# Patient Record
Sex: FEMALE | Race: BLACK OR AFRICAN AMERICAN | NOT HISPANIC OR LATINO | Employment: UNEMPLOYED | URBAN - METROPOLITAN AREA
[De-identification: names, ages, dates, MRNs, and addresses within clinical notes are randomized per-mention and may not be internally consistent; named-entity substitution may affect disease eponyms.]

---

## 2017-05-11 ENCOUNTER — OFFICE VISIT (OUTPATIENT)
Dept: OBSTETRICS AND GYNECOLOGY | Facility: CLINIC | Age: 47
End: 2017-05-11
Payer: COMMERCIAL

## 2017-05-11 ENCOUNTER — HOSPITAL ENCOUNTER (OUTPATIENT)
Dept: RADIOLOGY | Facility: HOSPITAL | Age: 47
Discharge: HOME | End: 2017-05-11
Attending: NURSE PRACTITIONER
Payer: COMMERCIAL

## 2017-05-11 VITALS
BODY MASS INDEX: 37.15 KG/M2 | HEIGHT: 63 IN | WEIGHT: 209.69 LBS | SYSTOLIC BLOOD PRESSURE: 138 MMHG | DIASTOLIC BLOOD PRESSURE: 80 MMHG

## 2017-05-11 DIAGNOSIS — Z12.31 ENCOUNTER FOR SCREENING MAMMOGRAM FOR BREAST CANCER: ICD-10-CM

## 2017-05-11 DIAGNOSIS — Z01.419 ENCOUNTER FOR GYNECOLOGICAL EXAMINATION WITHOUT ABNORMAL FINDING: Primary | ICD-10-CM

## 2017-05-11 DIAGNOSIS — R30.0 DYSURIA: ICD-10-CM

## 2017-05-11 DIAGNOSIS — R81 GLUCOSE FOUND IN URINE ON EXAMINATION: ICD-10-CM

## 2017-05-11 PROCEDURE — 99999 PR PBB SHADOW E&M-NEW PATIENT-LVL III: CPT | Mod: PBBFAC,,, | Performed by: NURSE PRACTITIONER

## 2017-05-11 PROCEDURE — 77067 SCR MAMMO BI INCL CAD: CPT | Mod: TC

## 2017-05-11 PROCEDURE — 99386 PREV VISIT NEW AGE 40-64: CPT | Mod: S$GLB,,, | Performed by: NURSE PRACTITIONER

## 2017-05-11 PROCEDURE — 77067 SCR MAMMO BI INCL CAD: CPT | Mod: 26,,, | Performed by: RADIOLOGY

## 2017-05-11 RX ORDER — AMLODIPINE BESYLATE 5 MG/1
TABLET ORAL
COMMUNITY
Start: 2017-04-11 | End: 2020-11-06

## 2017-05-11 RX ORDER — FUROSEMIDE 20 MG/1
TABLET ORAL
COMMUNITY
Start: 2017-04-11 | End: 2020-11-06

## 2017-05-11 NOTE — PROGRESS NOTES
"CC: Well woman exam    Rosibel Herrera is a 47 y.o. female  presents for a well woman exam.  No issues, problems, or complaints.      Past Medical History:   Diagnosis Date    Hypertension      Past Surgical History:   Procedure Laterality Date    ENDOMETRIAL ABLATION      HYSTERECTOMY      LITHOTRIPSY       Family History   Problem Relation Age of Onset    Diabetes Father      Social History   Substance Use Topics    Smoking status: Never Smoker    Smokeless tobacco: None    Alcohol use Yes      Comment: socially     OB History      Para Term  AB TAB SAB Ectopic Multiple Living    4 4        4          /80  Ht 5' 3" (1.6 m)  Wt 95.1 kg (209 lb 10.5 oz)  BMI 37.14 kg/m2    ROS:  GENERAL: Denies weight gain or weight loss. Feeling well overall.   SKIN: Denies rash or lesions.   HEAD: Denies head injury or headache.   NODES: Denies enlarged lymph nodes.   CHEST: Denies chest pain or shortness of breath.   CARDIOVASCULAR: Denies palpitations or left sided chest pain.   ABDOMEN: No abdominal pain, constipation, diarrhea, nausea, vomiting or rectal bleeding.   URINARY: No frequency, dysuria, hematuria, or burning on urination.  REPRODUCTIVE: See HPI.   BREASTS: The patient performs breast self-examination and denies pain, lumps, or nipple discharge.   HEMATOLOGIC: No easy bruisability or excessive bleeding.   MUSCULOSKELETAL: Denies joint pain or swelling.   NEUROLOGIC: Denies syncope or weakness.   PSYCHIATRIC: Denies depression, anxiety or mood swings.    PE:   APPEARANCE: Well nourished, well developed, in no acute distress.  AFFECT: WNL, alert and oriented x 3.  SKIN: No acne or hirsutism.  NECK: Neck symmetric without masses or thyromegaly.  NODES: No inguinal, cervical, axillary or femoral lymph node enlargement.  CHEST: Good respiratory effort.   ABDOMEN: Soft. No tenderness or masses. No hepatosplenomegaly. No hernias.  BREASTS: Symmetrical, no skin changes or visible lesions. No " palpable masses, nipple discharge bilaterally.  PELVIC: Normal external female genitalia without lesions. Normal hair distribution. Adequate perineal body, normal urethral meatus. Vagina atrophic without lesions or discharge. No significant cystocele or rectocele. Bimanual exam shows uterus and cervix to be surgically absent. Adnexa without masses or tenderness.      1. Encounter for gynecological examination without abnormal finding     2. Encounter for screening mammogram for breast cancer  Mammo Digital Screening Bilat with CAD   3. Dysuria  POCT URINALYSIS    and PLAN:    Patient was counseled today on A.C.S. Pap guidelines and recommendations for yearly pelvic exams, mammograms and monthly self breast exams; to see her PCP for other health maintenance.     ua dip shows 100 Glucose - pt has only drank a cup of coffee early am - otherwise has not had anything to eat  Recommend to see her pcp asap to have this checked out

## 2019-04-29 ENCOUNTER — TELEPHONE (OUTPATIENT)
Dept: OBSTETRICS AND GYNECOLOGY | Facility: CLINIC | Age: 49
End: 2019-04-29

## 2019-04-29 NOTE — TELEPHONE ENCOUNTER
Returned call to patient.  She requested a well woman visit with a physician and not a NP, per patient.  Appointment scheduled 05/01/19 at 9:00 am with Dr. Lisa, she confirmed appointment, location and provider.

## 2019-04-29 NOTE — TELEPHONE ENCOUNTER
----- Message from Maria Teresa Frost sent at 4/29/2019  2:54 PM CDT -----  Contact: pt  The pt request a call to schedule a appt, no additional info given, the pt can be reached at [phone] or [phone]///[id number]

## 2019-05-01 ENCOUNTER — OFFICE VISIT (OUTPATIENT)
Dept: OBSTETRICS AND GYNECOLOGY | Facility: CLINIC | Age: 49
End: 2019-05-01
Payer: MEDICAID

## 2019-05-01 ENCOUNTER — HOSPITAL ENCOUNTER (OUTPATIENT)
Dept: RADIOLOGY | Facility: HOSPITAL | Age: 49
Discharge: HOME | End: 2019-05-01
Attending: OBSTETRICS & GYNECOLOGY
Payer: MEDICAID

## 2019-05-01 VITALS
HEIGHT: 63 IN | SYSTOLIC BLOOD PRESSURE: 164 MMHG | WEIGHT: 197.06 LBS | BODY MASS INDEX: 34.91 KG/M2 | DIASTOLIC BLOOD PRESSURE: 100 MMHG

## 2019-05-01 DIAGNOSIS — Z12.39 BREAST CANCER SCREENING: Primary | ICD-10-CM

## 2019-05-01 DIAGNOSIS — Z12.39 BREAST CANCER SCREENING: ICD-10-CM

## 2019-05-01 DIAGNOSIS — Z78.0 MENOPAUSE: ICD-10-CM

## 2019-05-01 DIAGNOSIS — Z12.11 COLON CANCER SCREENING: ICD-10-CM

## 2019-05-01 DIAGNOSIS — F32.A ANXIETY AND DEPRESSION: ICD-10-CM

## 2019-05-01 DIAGNOSIS — F41.9 ANXIETY AND DEPRESSION: ICD-10-CM

## 2019-05-01 DIAGNOSIS — Z01.419 ENCOUNTER FOR GYNECOLOGICAL EXAMINATION WITHOUT ABNORMAL FINDING: ICD-10-CM

## 2019-05-01 PROCEDURE — 99396 PR PREVENTIVE VISIT,EST,40-64: ICD-10-PCS | Mod: S$PBB,,, | Performed by: OBSTETRICS & GYNECOLOGY

## 2019-05-01 PROCEDURE — 77063 MAMMO DIGITAL SCREENING BILAT WITH TOMOSYNTHESIS_CAD: ICD-10-PCS | Mod: 26,,, | Performed by: RADIOLOGY

## 2019-05-01 PROCEDURE — 99999 PR PBB SHADOW E&M-EST. PATIENT-LVL III: CPT | Mod: PBBFAC,,, | Performed by: OBSTETRICS & GYNECOLOGY

## 2019-05-01 PROCEDURE — 77067 MAMMO DIGITAL SCREENING BILAT WITH TOMOSYNTHESIS_CAD: ICD-10-PCS | Mod: 26,,, | Performed by: RADIOLOGY

## 2019-05-01 PROCEDURE — 99999 PR PBB SHADOW E&M-EST. PATIENT-LVL III: ICD-10-PCS | Mod: PBBFAC,,, | Performed by: OBSTETRICS & GYNECOLOGY

## 2019-05-01 PROCEDURE — 77063 BREAST TOMOSYNTHESIS BI: CPT | Mod: 26,,, | Performed by: RADIOLOGY

## 2019-05-01 PROCEDURE — 77067 SCR MAMMO BI INCL CAD: CPT | Mod: 26,,, | Performed by: RADIOLOGY

## 2019-05-01 PROCEDURE — 99396 PREV VISIT EST AGE 40-64: CPT | Mod: S$PBB,,, | Performed by: OBSTETRICS & GYNECOLOGY

## 2019-05-01 PROCEDURE — 77067 SCR MAMMO BI INCL CAD: CPT | Mod: TC

## 2019-05-01 PROCEDURE — 99213 OFFICE O/P EST LOW 20 MIN: CPT | Mod: PBBFAC | Performed by: OBSTETRICS & GYNECOLOGY

## 2019-05-01 RX ORDER — PAROXETINE HYDROCHLORIDE 20 MG/1
20 TABLET, FILM COATED ORAL EVERY MORNING
Qty: 30 TABLET | Refills: 11 | Status: SHIPPED | OUTPATIENT
Start: 2019-05-01 | End: 2020-11-06

## 2019-05-02 NOTE — PROGRESS NOTES
CC: Well woman exam    Rosibel Herrera is a 49 y.o. female  presents for a well woman exam.  LMP: No LMP recorded. Patient has had a hysterectomy.. Reports panic attacks, shakes, hot flashes. Has had panic attacks in 1980s that were stress related & currently going through stress now. Seen in ER 3/2019 for chest pain/sob    Past Medical History:   Diagnosis Date    Hypertension      Past Surgical History:   Procedure Laterality Date    ENDOMETRIAL ABLATION      HYSTERECTOMY      fibroids    LITHOTRIPSY       Social History     Socioeconomic History    Marital status:      Spouse name: Not on file    Number of children: Not on file    Years of education: Not on file    Highest education level: Not on file   Occupational History    Not on file   Social Needs    Financial resource strain: Not on file    Food insecurity:     Worry: Not on file     Inability: Not on file    Transportation needs:     Medical: Not on file     Non-medical: Not on file   Tobacco Use    Smoking status: Never Smoker    Smokeless tobacco: Never Used   Substance and Sexual Activity    Alcohol use: Yes     Comment: socially    Drug use: No    Sexual activity: Yes   Lifestyle    Physical activity:     Days per week: Not on file     Minutes per session: Not on file    Stress: Not on file   Relationships    Social connections:     Talks on phone: Not on file     Gets together: Not on file     Attends [employer] service: Not on file     Active member of club or organization: Not on file     Attends meetings of clubs or organizations: Not on file     Relationship status: Not on file   Other Topics Concern    Not on file   Social History Narrative    Not on file     Family History   Problem Relation Age of Onset    Diabetes Father      OB History        4    Para   4    Term   4            AB        Living   4       SAB        TAB        Ectopic        Multiple        Live Births   4            "      Current Outpatient Medications:     amlodipine (NORVASC) 5 MG tablet, , Disp: , Rfl:     furosemide (LASIX) 20 MG tablet, , Disp: , Rfl:     paroxetine (PAXIL) 20 MG tablet, Take 1 tablet (20 mg total) by mouth every morning., Disp: 30 tablet, Rfl: 11    GYNECOLOGY HISTORY:  No abnormal pap/std    DATA REVIEWED:  Last pap: no abnormal   Last mmg: normal Date: 2017; done today  Last colonoscopy: none    BP (!) 164/100 (BP Location: Left arm)   Ht 5' 3" (1.6 m)   Wt 89.4 kg (197 lb 1.5 oz)   BMI 34.91 kg/m²   *just took her BP meds    ROS:  GENERAL: Denies weight gain or weight loss. Feeling well overall.   SKIN: Denies rash or lesions.   HEAD: Denies head injury or headache.   NODES: Denies enlarged lymph nodes.   CHEST: Denies chest pain or shortness of breath.   CARDIOVASCULAR: Denies palpitations or left sided chest pain.   ABDOMEN: No abdominal pain, constipation, diarrhea, nausea, vomiting or rectal bleeding.   URINARY: No frequency, dysuria, hematuria, or burning on urination.  REPRODUCTIVE: See HPI.   BREASTS: The patient denies pain, lumps, or nipple discharge.   HEMATOLOGIC: No easy bruisability or excessive bleeding.   MUSCULOSKELETAL: Denies joint pain or swelling.   NEUROLOGIC: Denies syncope or weakness.   PSYCHIATRIC: Denies depression, anxiety or mood swings.    PHYSICAL EXAM:    APPEARANCE: Well nourished, well developed, in no acute distress.  AFFECT: WNL, alert and oriented x 3  SKIN: No acne or hirsutism  NECK: Neck symmetric without masses or thyromegaly  NODES: No inguinal, cervical, axillary, or femoral lymph node enlargement  CHEST: Good respiratory effect  ABDOMEN: Soft.  No tenderness or masses.  No hepatosplenomegaly.  No hernias.  BREASTS: Symmetrical, no skin changes or visible lesions.  No palpable masses, nipple discharge bilaterally.  PELVIC: Normal external genitalia without lesions.  Normal hair distribution.  Adequate perineal body, normal urethral meatus.  Vagina " atrophic without lesions or discharge. No significant cystocele or rectocele. bimanual exam shows uterus/cervix absent  EXTREMITIES: No edema.    Breast cancer screening  -     Cancel: Mammo Digital Screening Bilat With CAD; Future; Expected date: 05/01/2019    Encounter for gynecological examination without abnormal finding    Anxiety and depression    Colon cancer screening  -     Case request GI: COLONOSCOPY    Menopause    Other orders  -     paroxetine (PAXIL) 20 MG tablet; Take 1 tablet (20 mg total) by mouth every morning.  Dispense: 30 tablet; Refill: 11    Patient was counseled today on A.C.S. Pap guidelines (none) and recommendations for yearly pelvic exams, yearly mammograms starting age 40, and clinical breast exams; to see her PCP for other health maintenance. Treatment options for symptomatic hot flashes that can alleviate anxiety as well, risks of ERT discussed. Desires trial of non-hormonal tx

## 2020-10-16 ENCOUNTER — TELEPHONE (OUTPATIENT)
Dept: OBSTETRICS AND GYNECOLOGY | Facility: CLINIC | Age: 50
End: 2020-10-16

## 2020-10-16 DIAGNOSIS — Z12.31 ENCOUNTER FOR SCREENING MAMMOGRAM FOR MALIGNANT NEOPLASM OF BREAST: Primary | ICD-10-CM

## 2020-10-16 NOTE — TELEPHONE ENCOUNTER
----- Message from Bailee Oliveira sent at 10/16/2020 12:30 PM CDT -----  Regarding: mammo, annual  Type:  Sooner Apoointment Request    Caller is requesting a sooner appointment.  Caller declined first available appointment listed below.  Caller will not accept being placed on the waitlist and is requesting a message be sent to doctor.  Name of Caller:pt  When is the first available appointment?n/a  Symptoms:mammo, annual  Would the patient rather a call back or a response via [facility]? Call back  Best Call Back Number:[phone]  Additional Information: Please call back. Thanks

## 2020-10-16 NOTE — TELEPHONE ENCOUNTER
Called patient and scheduled mammo and annual.  Visitor's policy and check in procedure explained and patient verbalized understanding.

## 2020-11-06 ENCOUNTER — OFFICE VISIT (OUTPATIENT)
Dept: OBSTETRICS AND GYNECOLOGY | Facility: CLINIC | Age: 50
End: 2020-11-06
Payer: MEDICAID

## 2020-11-06 ENCOUNTER — HOSPITAL ENCOUNTER (OUTPATIENT)
Dept: RADIOLOGY | Facility: HOSPITAL | Age: 50
Discharge: HOME | End: 2020-11-06
Attending: NURSE PRACTITIONER
Payer: MEDICAID

## 2020-11-06 VITALS — HEIGHT: 63 IN | WEIGHT: 197.06 LBS | BODY MASS INDEX: 34.91 KG/M2

## 2020-11-06 VITALS
BODY MASS INDEX: 35.47 KG/M2 | SYSTOLIC BLOOD PRESSURE: 138 MMHG | HEIGHT: 63 IN | WEIGHT: 200.19 LBS | DIASTOLIC BLOOD PRESSURE: 96 MMHG

## 2020-11-06 DIAGNOSIS — F41.9 ANXIETY: ICD-10-CM

## 2020-11-06 DIAGNOSIS — Z11.3 SCREENING FOR STD (SEXUALLY TRANSMITTED DISEASE): ICD-10-CM

## 2020-11-06 DIAGNOSIS — Z12.31 ENCOUNTER FOR SCREENING MAMMOGRAM FOR MALIGNANT NEOPLASM OF BREAST: ICD-10-CM

## 2020-11-06 DIAGNOSIS — Z01.419 ENCOUNTER FOR GYNECOLOGICAL EXAMINATION WITHOUT ABNORMAL FINDING: Primary | ICD-10-CM

## 2020-11-06 PROCEDURE — 77067 SCR MAMMO BI INCL CAD: CPT | Mod: TC

## 2020-11-06 PROCEDURE — 77063 MAMMO DIGITAL SCREENING BILAT WITH TOMO: ICD-10-PCS | Mod: 26,,, | Performed by: RADIOLOGY

## 2020-11-06 PROCEDURE — 99213 OFFICE O/P EST LOW 20 MIN: CPT | Mod: PBBFAC | Performed by: NURSE PRACTITIONER

## 2020-11-06 PROCEDURE — 77063 BREAST TOMOSYNTHESIS BI: CPT | Mod: 26,,, | Performed by: RADIOLOGY

## 2020-11-06 PROCEDURE — 99999 PR PBB SHADOW E&M-EST. PATIENT-LVL III: CPT | Mod: PBBFAC,,, | Performed by: NURSE PRACTITIONER

## 2020-11-06 PROCEDURE — 99999 PR PBB SHADOW E&M-EST. PATIENT-LVL III: ICD-10-PCS | Mod: PBBFAC,,, | Performed by: NURSE PRACTITIONER

## 2020-11-06 PROCEDURE — 99396 PR PREVENTIVE VISIT,EST,40-64: ICD-10-PCS | Mod: S$PBB,,, | Performed by: NURSE PRACTITIONER

## 2020-11-06 PROCEDURE — 99396 PREV VISIT EST AGE 40-64: CPT | Mod: S$PBB,,, | Performed by: NURSE PRACTITIONER

## 2020-11-06 PROCEDURE — 77067 SCR MAMMO BI INCL CAD: CPT | Mod: 26,,, | Performed by: RADIOLOGY

## 2020-11-06 PROCEDURE — 77067 MAMMO DIGITAL SCREENING BILAT WITH TOMO: ICD-10-PCS | Mod: 26,,, | Performed by: RADIOLOGY

## 2020-11-06 RX ORDER — LOSARTAN POTASSIUM AND HYDROCHLOROTHIAZIDE 12.5; 5 MG/1; MG/1
1 TABLET ORAL DAILY
COMMUNITY
Start: 2020-10-06

## 2020-11-06 RX ORDER — FLUOXETINE 10 MG/1
10 CAPSULE ORAL DAILY
Qty: 30 CAPSULE | Refills: 11 | Status: SHIPPED | OUTPATIENT
Start: 2020-11-06 | End: 2022-02-10

## 2020-11-06 NOTE — PATIENT INSTRUCTIONS
Treating Anxiety Disorders with Therapy    If you have an anxiety disorder, you dont have to suffer anymore. Treatment is available. Therapy (also called counseling) is often a helpful treatment for anxiety disorders. With therapy, a specially trained professional (therapist) helps you face and learn to manage your anxiety. Therapy can be short-term or long-term depending on your needs. In some cases, medicine may also be prescribed with therapy. It may take time before you notice how much therapy is helping, but stick with it. With therapy, you can feel better.  Cognitive behavioral therapy (CBT)  Cognitive behavioral therapy (CBT) teaches you to manage anxiety. It does this by helping you understand how you think and act when youre anxious. Research has shown CBT to be a very effective treatment for anxiety disorders. How CBT is run is almost like a class. It involves homework and activities to build skills that teach you to cope with anxiety step by step. It can be done in a group or one-on-one, and often takes place for a set number of sessions. CBT has two main parts:  · Cognitive therapy helps you identify the negative, irrational thoughts that occur with your anxiety. Youll learn to replace these with more positive, realistic thoughts.  · Behavioral therapy helps you change how you react to anxiety. Youll learn coping skills and methods for relaxing to help you better deal with anxiety.  Other forms of therapy  Other therapy methods may work better for you than CBT. Or, you may move from CBT to another form of therapy as your treatment needs change. This may mean meeting with a therapist by yourself or in a group. Therapy can also help you work through problems in your life, such as drug or alcohol dependence, that may be making your anxiety worse.  Getting better takes time  Therapy will help you feel better and teach you skills to help manage anxiety long term. But change doesnt happen right away. It  takes a commitment from you. And treatment only works if you learn to face the causes of your anxiety. So, you might feel worse before you feel better. This can sometimes make it hard to stick with it. But remember: Therapy is a very effective treatment. The results will be well worth it.  Helping yourself  If anxiety is wearing you down, here are some things you can do to cope:  · Check with your doctor and rule out any physical problems that may be causing the anxiety symptoms.  · If an anxiety disorder is diagnosed, seek mental healthcare. This is an illness and it can respond to treatment. Most types of anxiety disorders will respond to talk therapy and medicine.  · Educate yourself about anxiety disorders. Keep track of helpful online resources and books you can use during stressful periods.  · Try stress management techniques such as meditation.  · Consider online or in-person support groups.  · Dont fight your feelings. Anxiety feeds itself. The more you worry about it, the worse it gets. Instead, try to identify what might have triggered your anxiety. Then try to put this threat in perspective.  · Keep in mind that you cant control everything about a situation. Change what you can and let the rest take its course.  · Exercise -- its a great way to relieve tension and help your body feel relaxed.  · Examine your life for stress, and try to find ways to reduce it.  · Avoid caffeine and nicotine, which can make anxiety symptoms worse.  · Fight the temptation to turn to alcohol or unprescribed drugs for relief. They only make things worse in the long run.   Date Last Reviewed: 1/1/2017  © 8983-7765 [employer]. [address] [place] [address], [place] [address]. All rights reserved. This information is not intended as a substitute for professional medical care. Always follow your healthcare professional's instructions.        Treating Anxiety Disorders with Medicine  An anxiety disorder can make you feel  nervous or apprehensive, even without a clear reason. In people age 65 and older, generalized anxiety disorder is one of the most commonly diagnosed anxiety disorders. Many times it occurs with depression. Certain anxiety disorders can cause intense feelings of fear or panic. You may even have physical symptoms such as a racing heartbeat, sweating, or dizziness. If you have these feelings, you dont have to suffer anymore. Treatment to help you overcome your fears will likely include therapy (also called counseling). Medicine may also be prescribed to help control your symptoms.    Medicines  Certain medicines may be prescribed to help control your symptoms. So you may feel less anxious. You may also feel able to move forward with therapy. At first, medicines and dosages may need to be adjusted to find what works best for you. Try to be patient. Tell your healthcare provider how a medicine makes you feel. This way, you can work together to find the treatment thats best for you. Keep in mind that medicines can have side effects. Talk with your provider about any side effects that are bothering you. Changing the dose or type of medicine may help. Dont stop taking medicine on your own. That can cause symptoms to come back.  · Anti-anxiety medicine. This medicine eases symptoms and helps you relax. Your healthcare provider will explain when and how to use it. It may be prescribed for use before situations that make you anxious. You may also be told to take medicine on a regular schedule. Anti-anxiety medicine may make you feel a little sleepy or out of it. Dont drive a car or operate machinery while on this medicine, until you know how it affects you.  Caution  Never use alcohol or other drugs with anti-anxiety medicines. This could result in loss of muscular control, sedation, coma, or death. Also, use only the amount of medicine prescribed for you. If you think you may have taken too much, get emergency care  right away.   · Antidepressant medicine. This kind of medicine is often used to treat anxiety, even if you arent depressed. An antidepressant helps balance out brain chemicals. This helps keep anxiety under control. This medicine is taken on a schedule. It takes a few weeks to start working. If you dont notice a change at first, you may just need more time. But if you dont notice results after the first few weeks, tell your provider.  Keep taking medicines as prescribed  Never change your dosage, share or use another person's medicine, or stop taking your medicines without talking to your healthcare provider first. Keep the following in mind:  · Some medicines must be taken on a schedule. Make this part of your daily routine. For instance, always take your pill before brushing your teeth. A pillbox can help you remember if youve taken your medicine each day.  · Medicines are often taken for 6 to 12 months. Your healthcare provider will then evaluate whether you need to stay on them. Many people who have also had therapy may no longer need medicine to manage anxiety.  · You may need to stop taking medicine slowly to give your body time to adjust. When its time to stop, your healthcare provider will tell you more. Remember: Never stop taking your medicine without talking to your provider first.  · If symptoms return, you may need to start taking medicines again. This isnt your fault. Its just the nature of your anxiety disorder.  Special concerns  · Side effects. Medicines may cause side effects. Ask your healthcare provider or pharmacist what you can expect. They may have ideas for avoiding some side effects.  · Sexual problems. Some antidepressants can affect your desire for sex or your ability to have an orgasm. A change in dosage or medicine often solves the problem. If you have a sexual side effect that concerns you, tell your healthcare provider.  · Addiction. If youve never had a problem with drugs or  alcohol, you may not have a problem with medicines used to treat anxiety disorders. But always discuss the medicines with your healthcare provider before taking them. If you have a history of addiction, you may not be able to use certain medicines used to treat anxiety disorders.  · Medicine interactions. Always check with your pharmacist before using any over-the-counter medicines, including herbal supplements.   Date Last Reviewed: 5/1/2017  © 4976-1320 [employer]. [address] [place] [address], [place] [address]. All rights reserved. This information is not intended as a substitute for professional medical care. Always follow your healthcare professional's instructions.        Understanding Anxiety Disorders  Almost everyone gets nervous now and then. Its normal to have knots in your stomach before a test, or for your heart to race on a first date. But an anxiety disorder is much more than a case of nerves. In fact, its symptoms may be overwhelming. But treatment can relieve many of these symptoms. Talking to your healthcare provider is the first step.    What are anxiety disorders?  An anxiety disorder causes intense feelings of panic and fear. These feelings may arise for no apparent reason. And they tend to recur again and again. They may prevent you from coping with life and cause you great distress. As a result, you may avoid anything that triggers your fear. In extreme cases, you may never leave the house. Anxiety disorders may cause other symptoms, such as:  · Obsessive thoughts you cant control  · Constant nightmares or painful thoughts of the past  · Nausea, sweating, and muscle tension  · Trouble sleeping or concentrating  What causes anxiety disorders?  Anxiety disorders tend to run in families. For some people, childhood abuse or neglect may play a role. For others, stressful life events or trauma may trigger anxiety disorders. Anxiety can trigger low self-esteem and poor coping  skills.  Common anxiety disorders  · Panic disorder. This causes an intense fear of being in danger.  · Phobias. These are extreme fears of certain objects, places, or events.  · Obsessive-compulsive disorder. This causes you to have unwanted thoughts and urges. You also may perform certain actions over and over.  · Posttraumatic stress disorder. This occurs in people who have survived a terrible ordeal. It can cause nightmares and flashbacks about the event.  · Generalized anxiety disorder. This causes constant worry that can greatly disrupt your life.   Getting better  You may believe that nothing can help you. Or, you might fear what others may think. But most anxiety symptoms can be eased. Having an anxiety disorder is nothing to be ashamed of. Most people do best with treatment that combines medicine and therapy. These arent cures. But they can help you live a healthier life.  Date Last Reviewed: 2/1/2017 © 2000-2017 [employer]. [address] [place] [address]. All rights reserved. This information is not intended as a substitute for professional medical care. Always follow your healthcare professional's instructions.        Anxiety Reaction  Anxiety is the feeling we all get when we think something bad might happen. It is a normal response to stress and usually causes only a mild reaction. When anxiety becomes more severe, it can interfere with daily life. In some cases, you may not even be aware of what it is youre anxious about. There may also be a genetic link or it may be a learned behavior in the home.  Both psychological and physical triggers cause stress reaction. It's often a response to fear or emotional stress, real or imagined. This stress may come from home, family, work, or social relationships.  During an anxiety reaction, you may feel:  · Helpless  · Nervous  · Depressed  · Irritable  Your body may show signs of anxiety in many ways. You may experience:  · Dry  mouth  · Shakiness  · Dizziness  · Weakness  · Trouble breathing  · Breathing fast (hyperventilating)  · Chest pressure  · Sweating  · Headache  · Nausea  · Diarrhea  · Tiredness  · Inability to sleep  · Sexual problems  Home care  · Try to locate the sources of stress in your life. They may not be obvious. These may include:  ¨ Daily hassles of life (traffic jams, missed appointments, car troubles, etc.)  ¨ Major life changes, both good (new baby, job promotion) and bad (loss of job, loss of loved one)  ¨ Overload: feeling that you have too many responsibilities and can't take care of all of them at once  ¨ Feeling helpless, feeling that your problems are beyond what youre able to solve  · Notice how your body reacts to stress. Learn to listen to your body signals. This will help you take action before the stress becomes severe.  · When you can, do something about the source of your stress. (Avoid hassles, limit the amount of change that happens in your life at one time and take a break when you feel overloaded).  · Unfortunately, many stressful situations can't be avoided. It is necessary to learn how to better manage stress. There are many proven methods that will reduce your anxiety. These include simple things like exercise, good nutrition and adequate rest. Also, there are certain techniques that are helpful:  ¨ Relaxation  ¨ Breathing exercises  ¨ Visualization  ¨ Biofeedback  ¨ Meditation  For more information about this, consult your doctor or go to a local bookstore and review the many books and tapes available on this subject.  Follow-up care  If you feel that your anxiety is not responding to self-help measures, contact your doctor or make an appointment with a counselor. You may need short-term psychological counseling and temporary medicine to help you manage stress.  Call 911  Call your healthcare provider right away if any of these occur:  · Trouble breathing  · Confusion  · Drowsiness or trouble  wakening  · Fainting or loss of consciousness  · Rapid heart rate  · Seizure  · New chest pain that becomes more severe, lasts longer, or spreads into your shoulder, arm, neck, jaw, or back  When to seek medical advice  Call your healthcare provider right away if any of these occur:  · Your symptoms get worse  · Severe headache not relieved by rest and mild pain reliever  Date Last Reviewed: 9/29/2015  © 0847-4576 [employer]. [address] [place] [address], [place] [address]. All rights reserved. This information is not intended as a substitute for professional medical care. Always follow your healthcare professional's instructions.        Your Bodys Response to Anxiety    Normal anxiety is part of the bodys natural defense system. It's an alert to a threat that is unknown, vague, or comes from your own internal fears. While youre in this state, your feelings can range from a vague sense of worry to physical sensations such as a pounding heartbeat. These feelings make you want to react to the threat. An anxiety response is normal in many situations. But when you have an anxiety disorder, the same response can occur at the wrong times.  Anxiety can be helpful  Normal anxiety is a signal from your brain that warns you of a threat and is a normal response to help you prevent something or decrease the bad effects of something you can't control. For example, anxiety is a normal response to situations that might damage your body, separate you from a loved one, or lose your job. The symptoms of anxiety can be physical and mental.  How does it feel?  At certain times, people with anxiety may have:  · Dizziness  · Muscle tension or pain  · Restlessness  · Sleeplessness  · Trouble concentrating  · Racing heartbeat  · Fast breathing  · Shaking or trembling  · Stomachache  · Diarrhea  · Loss of energy  · Sweating  · Cold, clammy hands  · Chest pain  · Dry mouth  Anxiety can also be a problem  Anxiety can become a  "problem when it is hard to control, occurs for months, and interferes with important parts of your life. With an anxiety disorder, your body has the response described above, but in inappropriate ways. The response a person has depends on the anxiety disorder he or she has. With some disorders, the anxiety is way out of proportion to the threat that triggers it. With others, anxiety may occur even when there isnt a clear threat or trigger.  Who does it affect?  Some people are more prone to persistent anxiety than others. It tends to run in families, and it affects more younger people than older people, and more women than men. But no age, race, or gender is immune to anxiety problems.  Anxiety can be treated  The good news is that the anxiety thats disrupting your life can be treated. Check with your healthcare provider and rule out any physical problems that may be causing the anxiety symptoms. If an anxiety disorder is diagnosed seek mental healthcare. This is an illness and it can respond to treatment. Most types of anxiety disorders will respond to "talk therapy" and medicines. Working with your doctor or other healthcare provider, you can develop skills to help you cope with anxiety. You can also gain the perspective you need to overcome your fears. Note: Good sources of support or guidance can be found at your local hospital, mental health clinic, or an employee assistance program.  How to cope with anxiety  If anxiety is wearing you down, here are some things you can do to cope:  · Keep in mind that you cant control everything about a situation. Change what you can and let the rest take its course.  · Exercise--its a great way to relieve tension and help your body feel relaxed.  · Avoid caffeine and nicotine, which can make anxiety symptoms worse.  · Fight the temptation to turn to alcohol or unprescribed drugs for relief. They only make things worse in the long run.  · Educate yourself about anxiety " disorders. Keep track of helpful online resources and books you can use during stressful periods.  · Try stress management techniques such as meditation.  · Consider online or in-person support groups.   Date Last Reviewed: 1/1/2017 © 2000-2017 [employer]. [address] [place] [address], [place] [address]. All rights reserved. This information is not intended as a substitute for professional medical care. Always follow your healthcare professional's instructions.        Breast Health: Breast Self-Awareness  What is breast self-awareness?  Breast self-awareness is knowing how your breasts normally look and feel. Your breasts change as you go through different stages of your life. So its important to learn what is normal for your breasts. Breast self-awareness helps you notice any changes in your breasts right away. Report any changes to your healthcare provider.  Why is breast self-awareness important?  Many experts now say that women should focus on breast self-awareness instead of doing a breast self-examination (BSE). These experts include the American Cancer Society, the U.S. Preventive Services Task Force, and the American Congress of Obstetricians and Gynecologists. Some experts even advise not teaching women to do a BSE. Thats because research hasnt shown a clear benefit to doing BSEs.  Breast self-awareness is different than a BSE. Breast self-awareness isnt about following a certain method and schedule. Its about knowing what's normal for your breasts. That way you can notice even small changes right away. If you see any changes, report them to your healthcare provider.  Changes to look for  Call your healthcare provider if you find any changes in your breasts that concern you. These changes may include:  · A lump  · Nipple discharge other than breast milk, especially a bloody discharge  · Swelling  · A change in size or shape  · Skin irritation, such as redness, thickening, or dimpling of the  skin  · Swollen lymph nodes in the armpit  · Nipple problems, such as pain or redness  If you find a lump  Contact your provider if you find lumpiness in one breast, feel something different in the tissue, or feel a definite lump. Sometimes lumpiness may be due to menstrual changes. But there may be reason for concern.  Your provider may want to see you right away if you have:  · Nipple discharge that is bloody  · Skin changes on your breast, such as dimpling or puckering  Its normal to be upset if you find a lump. But its important to contact your provider right away. Remember that most breast lumps are benign. This means they are not cancer.  Date Last Reviewed: 8/10/2015  © 9269-9422 [employer]. [address] [place] [address], [place] [address]. All rights reserved. This information is not intended as a substitute for professional medical care. Always follow your healthcare professional's instructions.        Clinical Breast Exam    Many health organizations recommend a yearly clinical breast exam. This exam may be done by a gynecologist, family healthcare provider, nurse practitioner, or specially trained nurse. Yearly breast exams help to make sure that breast conditions are found early.  Your healthcare providers role  A healthcare professional knows the tests and follow-up care needed if a problem is found. Your clinical exam is also a great time to ask questions about breast self-exams. You can find out if youre checking your breasts in the best way. Or you may want to ask how pregnancy, breast implants, or breast reduction surgery affect the way you should check your breasts.  Diagnostic tests  If a clinical exam reveals a breast change, you may have other tests to find out more. These tests may include:  · Mammography. A low-dose X-ray of your breast tissue.  · Ultrasound. An imaging test that uses sound waves to create images of your breast.  · Biopsy. A small amount of breast tissue is removed  by needle or by a cut (incision). The tissue is then checked under a microscope.  Guidelines for having clinical breast exams  The American College of Obstetricians and Gynecologists recommends that starting at age 29, you should have a clinical breast exam every 1 to 3 years. After age 40, have a clinical breast exam each year. If youre at higher risk for breast cancer, you may need exams more often. Risk factors for breast cancer may include:  · Being over 50 or postmenopausal  · Having a family history of breast cancer  · Having the BRCA1 or BRCA2 gene mutation or certain other gene mutations  · Having more menstrual periods due to starting menstruation early (before age 12) or having a late menopause (after age 55)  · Having no pregnancies  · Having a first pregnancy after age 30  · Being obese  · Having a history of radiation treatment to your chest area  · Exposure to OMER during your mother's pregnancy  · Not being active  · Drinking too much alcohol  · Having dense breast tissue  · Taking hormone therapy after menopause  Other health organizations have different recommendations. Talk to your healthcare provider about what is best for you.   Date Last Reviewed: 8/13/2015 © 2000-2017 [employer]. [address] [place] [address], [place] [address]. All rights reserved. This information is not intended as a substitute for professional medical care. Always follow your healthcare professional's instructions.

## 2020-11-06 NOTE — PROGRESS NOTES
"CC: Well woman exam    Rosibel Herrera is a 50 y.o. female  presents for a well woman exam.  No issues, problems, or complaints.    MMG done today.   Still having stressful issues.  Parents did in 2018, just found out  having an affair on 2020 for past 3 years, has decided already to divorce.  Wants STD testing done.   Off the paxil from last year.  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  Open to starting meds for anxiety and seeing psych for counseling.     Past Medical History:   Diagnosis Date    Hypertension      Past Surgical History:   Procedure Laterality Date    ENDOMETRIAL ABLATION      HYSTERECTOMY      fibroids    LITHOTRIPSY       Family History   Problem Relation Age of Onset    Diabetes Father      Social History     Tobacco Use    Smoking status: Never Smoker    Smokeless tobacco: Never Used   Substance Use Topics    Alcohol use: Yes     Comment: rarely    Drug use: No     OB History        4    Para   4    Term   4            AB        Living   4       SAB        TAB        Ectopic        Multiple        Live Births   4                 BP (!) 138/96   Ht 5' 3" (1.6 m)   Wt 90.8 kg (200 lb 2.8 oz)   BMI 35.46 kg/m²     ROS:  GENERAL: Denies weight gain or weight loss. Feeling well overall.   SKIN: Denies rash or lesions.   HEAD: Denies head injury or headache.   NODES: Denies enlarged lymph nodes.   CHEST: Denies chest pain or shortness of breath.   CARDIOVASCULAR: Denies palpitations or left sided chest pain.   ABDOMEN: No abdominal pain, constipation, diarrhea, nausea, vomiting or rectal bleeding.   URINARY: No frequency, dysuria, hematuria, or burning on urination.  REPRODUCTIVE: See HPI.   BREASTS: The patient performs breast self-examination and denies pain, lumps, or nipple discharge.   HEMATOLOGIC: No easy bruisability or excessive bleeding.   MUSCULOSKELETAL: Denies joint pain or swelling.   NEUROLOGIC: Denies " syncope or weakness.   PSYCHIATRIC: Denies depression, anxiety or mood swings.    PE:   APPEARANCE: Well nourished, well developed, in no acute distress.  AFFECT: WNL, alert and oriented x 3.  SKIN: No acne or hirsutism.  NECK: Neck symmetric without masses or thyromegaly.  NODES: No inguinal, cervical, axillary or femoral lymph node enlargement.  CHEST: Good respiratory effort.   ABDOMEN: Soft. No tenderness or masses. No hepatosplenomegaly. No hernias.  BREASTS: Symmetrical, no skin changes or visible lesions. No palpable masses, nipple discharge bilaterally.  PELVIC: Normal external female genitalia without lesions. Normal hair distribution. Adequate perineal body, normal urethral meatus. Vagina atrophic without lesions or discharge. No significant cystocele or rectocele. Bimanual exam shows uterus and cervix to be surgically absent. Adnexa without masses or tenderness.      1. Encounter for gynecological examination without abnormal finding     2. Anxiety  FLUoxetine 10 MG capsule    Ambulatory referral/consult to Psychiatry   3. Screening for STD (sexually transmitted disease)  HIV 1/2 Ag/Ab (4th Gen)    RPR    Hepatitis Panel, Acute    and PLAN:    Patient was counseled today on A.C.S. Pap guidelines and recommendations for yearly pelvic exams, mammograms and monthly self breast exams; to see her PCP for other health maintenance.     Have pt start low dose fluoxetine   appt with psychiatry

## 2020-11-10 ENCOUNTER — PATIENT MESSAGE (OUTPATIENT)
Dept: OBSTETRICS AND GYNECOLOGY | Facility: CLINIC | Age: 50
End: 2020-11-10

## 2021-03-26 ENCOUNTER — OFFICE VISIT (OUTPATIENT)
Dept: URGENT CARE | Facility: CLINIC | Age: 51
End: 2021-03-26
Payer: MEDICAID

## 2021-03-26 VITALS
BODY MASS INDEX: 34.6 KG/M2 | OXYGEN SATURATION: 99 % | SYSTOLIC BLOOD PRESSURE: 157 MMHG | DIASTOLIC BLOOD PRESSURE: 95 MMHG | HEART RATE: 73 BPM | WEIGHT: 195.31 LBS | TEMPERATURE: 98 F

## 2021-03-26 DIAGNOSIS — R30.0 DYSURIA: Primary | ICD-10-CM

## 2021-03-26 LAB
BILIRUB SERPL-MCNC: ABNORMAL MG/DL
BILIRUB UR QL STRIP: NEGATIVE
BLOOD URINE, POC: ABNORMAL
CLARITY UR REFRACT.AUTO: CLEAR
CLARITY, POC UA: CLEAR
COLOR UR AUTO: YELLOW
COLOR, POC UA: YELLOW
GLUCOSE UR QL STRIP: ABNORMAL
GLUCOSE UR QL STRIP: NEGATIVE
HGB UR QL STRIP: NEGATIVE
KETONES UR QL STRIP: ABNORMAL
KETONES UR QL STRIP: NEGATIVE
LEUKOCYTE ESTERASE UR QL STRIP: NEGATIVE
LEUKOCYTE ESTERASE URINE, POC: ABNORMAL
NITRITE UR QL STRIP: NEGATIVE
NITRITE, POC UA: ABNORMAL
PH UR STRIP: 6 [PH] (ref 5–8)
PH, POC UA: 5
PROT UR QL STRIP: NEGATIVE
PROTEIN, POC: ABNORMAL
SP GR UR STRIP: 1.01 (ref 1–1.03)
SPECIFIC GRAVITY, POC UA: 1
URN SPEC COLLECT METH UR: NORMAL
UROBILINOGEN, POC UA: ABNORMAL

## 2021-03-26 PROCEDURE — 99999 PR PBB SHADOW E&M-EST. PATIENT-LVL III: CPT | Mod: PBBFAC,,, | Performed by: PHYSICIAN ASSISTANT

## 2021-03-26 PROCEDURE — 99213 OFFICE O/P EST LOW 20 MIN: CPT | Mod: PBBFAC,PO | Performed by: PHYSICIAN ASSISTANT

## 2021-03-26 PROCEDURE — 87491 CHLMYD TRACH DNA AMP PROBE: CPT | Performed by: PHYSICIAN ASSISTANT

## 2021-03-26 PROCEDURE — 99999 PR PBB SHADOW E&M-EST. PATIENT-LVL III: ICD-10-PCS | Mod: PBBFAC,,, | Performed by: PHYSICIAN ASSISTANT

## 2021-03-26 PROCEDURE — 81003 URINALYSIS AUTO W/O SCOPE: CPT | Performed by: PHYSICIAN ASSISTANT

## 2021-03-26 PROCEDURE — 99214 OFFICE O/P EST MOD 30 MIN: CPT | Mod: S$PBB,,, | Performed by: PHYSICIAN ASSISTANT

## 2021-03-26 PROCEDURE — 99214 PR OFFICE/OUTPT VISIT, EST, LEVL IV, 30-39 MIN: ICD-10-PCS | Mod: S$PBB,,, | Performed by: PHYSICIAN ASSISTANT

## 2021-03-26 PROCEDURE — 87591 N.GONORRHOEAE DNA AMP PROB: CPT | Performed by: PHYSICIAN ASSISTANT

## 2021-03-26 PROCEDURE — 81002 URINALYSIS NONAUTO W/O SCOPE: CPT | Mod: PBBFAC,PO,59 | Performed by: PHYSICIAN ASSISTANT

## 2021-03-28 ENCOUNTER — TELEPHONE (OUTPATIENT)
Dept: URGENT CARE | Facility: CLINIC | Age: 51
End: 2021-03-28

## 2021-03-29 LAB
C TRACH DNA SPEC QL NAA+PROBE: NOT DETECTED
N GONORRHOEA DNA SPEC QL NAA+PROBE: NOT DETECTED

## 2021-04-29 ENCOUNTER — PATIENT MESSAGE (OUTPATIENT)
Dept: RESEARCH | Facility: HOSPITAL | Age: 51
End: 2021-04-29

## 2021-06-29 ENCOUNTER — TELEPHONE (OUTPATIENT)
Dept: OBSTETRICS AND GYNECOLOGY | Facility: CLINIC | Age: 51
End: 2021-06-29

## 2021-07-16 ENCOUNTER — HOSPITAL ENCOUNTER (EMERGENCY)
Facility: HOSPITAL | Age: 51
Discharge: HOME | End: 2021-07-17
Attending: EMERGENCY MEDICINE
Payer: MEDICAID

## 2021-07-16 DIAGNOSIS — R73.9 HYPERGLYCEMIA: Primary | ICD-10-CM

## 2021-07-16 DIAGNOSIS — H53.9 VISUAL DISTURBANCE: ICD-10-CM

## 2021-07-16 DIAGNOSIS — I10 HYPERTENSION, UNSPECIFIED TYPE: ICD-10-CM

## 2021-07-16 LAB
ALBUMIN SERPL BCP-MCNC: 3.6 G/DL (ref 3.5–5.2)
ALP SERPL-CCNC: 55 U/L (ref 55–135)
ALT SERPL W/O P-5'-P-CCNC: 21 U/L (ref 10–44)
ANION GAP SERPL CALC-SCNC: 17 MMOL/L (ref 8–16)
AST SERPL-CCNC: 22 U/L (ref 10–40)
B-OH-BUTYR BLD STRIP-SCNC: 0.5 MMOL/L (ref 0–0.5)
BACTERIA #/AREA URNS HPF: ABNORMAL /HPF
BASOPHILS # BLD AUTO: 0.03 K/UL (ref 0–0.2)
BASOPHILS NFR BLD: 0.4 % (ref 0–1.9)
BILIRUB SERPL-MCNC: 0.6 MG/DL (ref 0.1–1)
BILIRUB UR QL STRIP: NEGATIVE
BUN SERPL-MCNC: 18 MG/DL (ref 6–20)
CALCIUM SERPL-MCNC: 9.9 MG/DL (ref 8.7–10.5)
CHLORIDE SERPL-SCNC: 103 MMOL/L (ref 95–110)
CLARITY UR: CLEAR
CO2 SERPL-SCNC: 16 MMOL/L (ref 23–29)
COLOR UR: YELLOW
CREAT SERPL-MCNC: 1.2 MG/DL (ref 0.5–1.4)
DIFFERENTIAL METHOD: NORMAL
EOSINOPHIL # BLD AUTO: 0.1 K/UL (ref 0–0.5)
EOSINOPHIL NFR BLD: 1.4 % (ref 0–8)
ERYTHROCYTE [DISTWIDTH] IN BLOOD BY AUTOMATED COUNT: 12.5 % (ref 11.5–14.5)
EST. GFR  (AFRICAN AMERICAN): 60 ML/MIN/1.73 M^2
EST. GFR  (NON AFRICAN AMERICAN): 52 ML/MIN/1.73 M^2
GLUCOSE SERPL-MCNC: 234 MG/DL (ref 70–110)
GLUCOSE UR QL STRIP: ABNORMAL
HCT VFR BLD AUTO: 38.8 % (ref 37–48.5)
HEP C VIRUS HOLD SPECIMEN: NORMAL
HGB BLD-MCNC: 12.7 G/DL (ref 12–16)
HGB UR QL STRIP: ABNORMAL
IMM GRANULOCYTES # BLD AUTO: 0.02 K/UL (ref 0–0.04)
IMM GRANULOCYTES NFR BLD AUTO: 0.3 % (ref 0–0.5)
KETONES UR QL STRIP: ABNORMAL
LEUKOCYTE ESTERASE UR QL STRIP: NEGATIVE
LYMPHOCYTES # BLD AUTO: 3.1 K/UL (ref 1–4.8)
LYMPHOCYTES NFR BLD: 42.3 % (ref 18–48)
MCH RBC QN AUTO: 29.7 PG (ref 27–31)
MCHC RBC AUTO-ENTMCNC: 32.7 G/DL (ref 32–36)
MCV RBC AUTO: 91 FL (ref 82–98)
MICROSCOPIC COMMENT: ABNORMAL
MONOCYTES # BLD AUTO: 0.4 K/UL (ref 0.3–1)
MONOCYTES NFR BLD: 5.3 % (ref 4–15)
NEUTROPHILS # BLD AUTO: 3.7 K/UL (ref 1.8–7.7)
NEUTROPHILS NFR BLD: 50.3 % (ref 38–73)
NITRITE UR QL STRIP: NEGATIVE
NRBC BLD-RTO: 0 /100 WBC
PH UR STRIP: 6 [PH] (ref 5–8)
PLATELET # BLD AUTO: 297 K/UL (ref 150–450)
PMV BLD AUTO: 11.2 FL (ref 9.2–12.9)
POCT GLUCOSE: 216 MG/DL (ref 70–110)
POTASSIUM SERPL-SCNC: 3.9 MMOL/L (ref 3.5–5.1)
PROT SERPL-MCNC: 9.1 G/DL (ref 6–8.4)
PROT UR QL STRIP: ABNORMAL
RBC # BLD AUTO: 4.28 M/UL (ref 4–5.4)
RBC #/AREA URNS HPF: 1 /HPF (ref 0–4)
SODIUM SERPL-SCNC: 136 MMOL/L (ref 136–145)
SP GR UR STRIP: >=1.03 (ref 1–1.03)
URN SPEC COLLECT METH UR: ABNORMAL
UROBILINOGEN UR STRIP-ACNC: NEGATIVE EU/DL
WBC # BLD AUTO: 7.38 K/UL (ref 3.9–12.7)
WBC #/AREA URNS HPF: 3 /HPF (ref 0–5)
YEAST URNS QL MICRO: ABNORMAL

## 2021-07-16 PROCEDURE — 80053 COMPREHEN METABOLIC PANEL: CPT | Performed by: NURSE PRACTITIONER

## 2021-07-16 PROCEDURE — 93010 EKG 12-LEAD: ICD-10-PCS | Mod: ,,, | Performed by: INTERNAL MEDICINE

## 2021-07-16 PROCEDURE — 99285 EMERGENCY DEPT VISIT HI MDM: CPT | Mod: 25

## 2021-07-16 PROCEDURE — 93005 ELECTROCARDIOGRAM TRACING: CPT

## 2021-07-16 PROCEDURE — 25000003 PHARM REV CODE 250: Performed by: EMERGENCY MEDICINE

## 2021-07-16 PROCEDURE — 81000 URINALYSIS NONAUTO W/SCOPE: CPT | Performed by: EMERGENCY MEDICINE

## 2021-07-16 PROCEDURE — 96360 HYDRATION IV INFUSION INIT: CPT

## 2021-07-16 PROCEDURE — 87389 HIV-1 AG W/HIV-1&-2 AB AG IA: CPT | Performed by: EMERGENCY MEDICINE

## 2021-07-16 PROCEDURE — 93010 ELECTROCARDIOGRAM REPORT: CPT | Mod: ,,, | Performed by: INTERNAL MEDICINE

## 2021-07-16 PROCEDURE — 82010 KETONE BODYS QUAN: CPT | Performed by: EMERGENCY MEDICINE

## 2021-07-16 PROCEDURE — 86803 HEPATITIS C AB TEST: CPT | Performed by: EMERGENCY MEDICINE

## 2021-07-16 PROCEDURE — 85025 COMPLETE CBC W/AUTO DIFF WBC: CPT | Performed by: NURSE PRACTITIONER

## 2021-07-16 RX ADMIN — SODIUM CHLORIDE 1000 ML: 0.9 INJECTION, SOLUTION INTRAVENOUS at 11:07

## 2021-07-17 VITALS
TEMPERATURE: 99 F | OXYGEN SATURATION: 100 % | DIASTOLIC BLOOD PRESSURE: 97 MMHG | RESPIRATION RATE: 12 BRPM | HEART RATE: 62 BPM | WEIGHT: 192 LBS | BODY MASS INDEX: 34.02 KG/M2 | SYSTOLIC BLOOD PRESSURE: 170 MMHG | HEIGHT: 63 IN

## 2021-07-17 LAB
HCV AB SERPL QL IA: NEGATIVE
HIV 1+2 AB+HIV1 P24 AG SERPL QL IA: NEGATIVE

## 2021-07-17 RX ORDER — METFORMIN HYDROCHLORIDE 500 MG/1
500 TABLET ORAL
Qty: 30 TABLET | Refills: 0 | Status: SHIPPED | OUTPATIENT
Start: 2021-07-17 | End: 2022-12-30

## 2022-01-11 ENCOUNTER — TELEPHONE (OUTPATIENT)
Dept: OBSTETRICS AND GYNECOLOGY | Facility: CLINIC | Age: 52
End: 2022-01-11
Payer: MEDICAID

## 2022-01-11 DIAGNOSIS — Z12.31 ENCOUNTER FOR SCREENING MAMMOGRAM FOR BREAST CANCER: Primary | ICD-10-CM

## 2022-01-11 NOTE — TELEPHONE ENCOUNTER
----- Message from Georgie White sent at 1/11/2022  2:44 PM CST -----  Pt is calling in regards to getting an apt for a pap smear. Please call [phone]    .Type:  Mammogram    Caller is requesting to schedule their annual mammogram appointment.  Order is not listed in EPIC.  Please enter order and contact patient to schedule.  Name of Caller:SHANE WIN [[id number]]  Where would they like the mammogram performed?  Would the patient rather a call back or a response via [facility]? call  Best Call Back Number:[phone]  Additional Information:

## 2022-01-11 NOTE — TELEPHONE ENCOUNTER
----- Message from Georgie White sent at 1/11/2022  2:44 PM CST -----  Pt is calling in regards to getting an apt for a pap smear. Please call [phone]    .Type:  Mammogram    Caller is requesting to schedule their annual mammogram appointment.  Order is not listed in EPIC.  Please enter order and contact patient to schedule.  Name of Caller:SHANE WIN [[id number]]  Where would they like the mammogram performed?  Would the patient rather a call back or a response via [facility]? call  Best Call Back Number:[phone]  Additional Information:

## 2022-02-10 ENCOUNTER — OFFICE VISIT (OUTPATIENT)
Dept: OBSTETRICS AND GYNECOLOGY | Facility: CLINIC | Age: 52
End: 2022-02-10
Payer: MEDICAID

## 2022-02-10 ENCOUNTER — HOSPITAL ENCOUNTER (OUTPATIENT)
Dept: RADIOLOGY | Facility: HOSPITAL | Age: 52
Discharge: HOME | End: 2022-02-10
Attending: NURSE PRACTITIONER
Payer: MEDICAID

## 2022-02-10 VITALS
DIASTOLIC BLOOD PRESSURE: 96 MMHG | BODY MASS INDEX: 35.32 KG/M2 | SYSTOLIC BLOOD PRESSURE: 152 MMHG | WEIGHT: 199.31 LBS | HEIGHT: 63 IN

## 2022-02-10 DIAGNOSIS — Z01.419 ENCOUNTER FOR GYNECOLOGICAL EXAMINATION WITHOUT ABNORMAL FINDING: Primary | ICD-10-CM

## 2022-02-10 DIAGNOSIS — Z12.31 ENCOUNTER FOR SCREENING MAMMOGRAM FOR BREAST CANCER: ICD-10-CM

## 2022-02-10 PROCEDURE — 1159F PR MEDICATION LIST DOCUMENTED IN MEDICAL RECORD: ICD-10-PCS | Mod: CPTII,,, | Performed by: NURSE PRACTITIONER

## 2022-02-10 PROCEDURE — 99999 PR PBB SHADOW E&M-EST. PATIENT-LVL III: ICD-10-PCS | Mod: PBBFAC,,, | Performed by: NURSE PRACTITIONER

## 2022-02-10 PROCEDURE — 77063 BREAST TOMOSYNTHESIS BI: CPT | Mod: 26,,, | Performed by: RADIOLOGY

## 2022-02-10 PROCEDURE — 99999 PR PBB SHADOW E&M-EST. PATIENT-LVL III: CPT | Mod: PBBFAC,,, | Performed by: NURSE PRACTITIONER

## 2022-02-10 PROCEDURE — 77063 MAMMO DIGITAL SCREENING BILAT WITH TOMO: ICD-10-PCS | Mod: 26,,, | Performed by: RADIOLOGY

## 2022-02-10 PROCEDURE — 77063 BREAST TOMOSYNTHESIS BI: CPT | Mod: TC

## 2022-02-10 PROCEDURE — 3008F BODY MASS INDEX DOCD: CPT | Mod: CPTII,,, | Performed by: NURSE PRACTITIONER

## 2022-02-10 PROCEDURE — 77067 MAMMO DIGITAL SCREENING BILAT WITH TOMO: ICD-10-PCS | Mod: 26,,, | Performed by: RADIOLOGY

## 2022-02-10 PROCEDURE — 3080F PR MOST RECENT DIASTOLIC BLOOD PRESSURE >= 90 MM HG: ICD-10-PCS | Mod: CPTII,,, | Performed by: NURSE PRACTITIONER

## 2022-02-10 PROCEDURE — 3008F PR BODY MASS INDEX (BMI) DOCUMENTED: ICD-10-PCS | Mod: CPTII,,, | Performed by: NURSE PRACTITIONER

## 2022-02-10 PROCEDURE — 77067 SCR MAMMO BI INCL CAD: CPT | Mod: 26,,, | Performed by: RADIOLOGY

## 2022-02-10 PROCEDURE — 3077F PR MOST RECENT SYSTOLIC BLOOD PRESSURE >= 140 MM HG: ICD-10-PCS | Mod: CPTII,,, | Performed by: NURSE PRACTITIONER

## 2022-02-10 PROCEDURE — 99396 PR PREVENTIVE VISIT,EST,40-64: ICD-10-PCS | Mod: S$PBB,,, | Performed by: NURSE PRACTITIONER

## 2022-02-10 PROCEDURE — 1159F MED LIST DOCD IN RCRD: CPT | Mod: CPTII,,, | Performed by: NURSE PRACTITIONER

## 2022-02-10 PROCEDURE — 3080F DIAST BP >= 90 MM HG: CPT | Mod: CPTII,,, | Performed by: NURSE PRACTITIONER

## 2022-02-10 PROCEDURE — 99213 OFFICE O/P EST LOW 20 MIN: CPT | Mod: PBBFAC | Performed by: NURSE PRACTITIONER

## 2022-02-10 PROCEDURE — 99396 PREV VISIT EST AGE 40-64: CPT | Mod: S$PBB,,, | Performed by: NURSE PRACTITIONER

## 2022-02-10 PROCEDURE — 1160F PR REVIEW ALL MEDS BY PRESCRIBER/CLIN PHARMACIST DOCUMENTED: ICD-10-PCS | Mod: CPTII,,, | Performed by: NURSE PRACTITIONER

## 2022-02-10 PROCEDURE — 1160F RVW MEDS BY RX/DR IN RCRD: CPT | Mod: CPTII,,, | Performed by: NURSE PRACTITIONER

## 2022-02-10 PROCEDURE — 3077F SYST BP >= 140 MM HG: CPT | Mod: CPTII,,, | Performed by: NURSE PRACTITIONER

## 2022-02-10 NOTE — PATIENT INSTRUCTIONS
Patient Education       Lowering Your Risk of Breast Cancer   About this topic   Breast cancer is a serious illness. Breast cancer is when abnormal cells grow and divide more quickly in your breast. These cells form a growth or tumor. The abnormal cells may enter nearby tissue and spread to other parts of the body. It is the type of cancer most often seen in women. Men can have breast cancer, but it is a rare condition.  General   Some things in your life may increase your risk of breast cancer. You may not be able to change some of these. Others you can control.  You are more likely to get breast cancer if you:  · Have a mother, sister, or daughter who has had breast cancer  · Have used hormones for menopause for more than 5 years  · Have had radiation therapy to the breast or chest in the past  · Are overweight or do not exercise  · Had your first menstrual period before you were 11 years old  · Went through menopause after age 55  · Have never been pregnant or had your first child after age 35  · Have had breast cancer before  · Drink alcohol in any form  · Have dense breasts  · Are older in age  There is no certain way to prevent breast cancer. There are things you can do to lower your chances of having breast cancer.  · Keep a healthy weight. Lose weight if you are overweight. Being overweight raises your chances of having breast cancer.  · Eat a healthy diet to maintain a healthy weight, such as more fruits, vegetables, and lean cuts of meat. Decrease the amount of saturated fat in your diet.  · Exercise. Being active helps you keep a healthy weight.  · Limit your alcohol intake or do not drink alcohol. The more alcohol you drink, the higher your risk.  · Do not smoke cigarettes. Smoking can increase your risk of many types of cancer.  · Breastfeed your baby. This may help protect you. The longer you breastfeed, the more protection you have.  · Talk with your doctor about:  ? Limiting or stopping hormone  therapy.  ? Taking certain drugs to prevent breast cancer. For women at high risk of having breast cancer, there are a few drugs that may lower your risk.  ? Surgery to prevent you from having breast cancer if you are very high risk.  When do I need to call the doctor?   · Changes in your breasts  · A lump or area in your breast that feels different  · Discharge from your nipple  · Skin on your breast is dimpled or indented  · You have questions or concerns about your breasts  Helpful tips   · Talk to your doctor about the best kind of breast cancer screening for you.  · If you want to do self breast exams, have your doctor show you the right way to do them.  · Tell your doctor of any abnormal finding.  Where can I learn more?   National Cancer [place]  http://www.cancer.gov/types/breast/patient/breast-prevention-pdq   NHS Choices  http://www.nhs.uk/Conditions/Cancer-of-the-breast-female/Pages/prevention.aspx   [facility] Cas[facility]  http://ww5.komen.org/BreastCancer/LowerYourRisk.html#RiskFactors   Last Reviewed Date   2021-10-04  Consumer Information Use and Disclaimer   This information is not specific medical advice and does not replace information you receive from your health care provider. This is only a brief summary of general information. It does NOT include all information about conditions, illnesses, injuries, tests, procedures, treatments, therapies, discharge instructions or life-style choices that may apply to you. You must talk with your health care provider for complete information about your health and treatment options. This information should not be used to decide whether or not to accept your health care providers advice, instructions or recommendations. Only your health care provider has the knowledge and training to provide advice that is right for you.  Copyright   Copyright © 2021 [employer], [employer] and its affiliates and/or licensors. All rights reserved.

## 2022-02-10 NOTE — PROGRESS NOTES
"CC: Well woman exam    Rosibel Herrera is a 52 y.o. female  presents for a well woman exam.  No issues, problems, or complaints.    Did not do well on paxil but still feeling menopause hot flashes and anxiety about mid month.  Estroven no help after 2mths.     Past Medical History:   Diagnosis Date    Calculus of kidney 2014 2:20:53 PM    [facility] Historical - Urology: KidneyStones-No Additional Notes    Complications affecting other specified body systems, hypertension 3/26/2014 1:38:44 PM    [facility] Historical - Cardiovascular: Hypertension-No Additional Notes    Excessive or frequent menstruation 3/20/2014 9:22:15 AM    [facility]: Bleeding, Menorrhagia-No Additional Notes    Hypertension     Leiomyoma of uterus 3/20/2014 9:23:21 AM    [facility]: Fibroids/Leiomyoma of Uterus-2.1x1.8cm (POST/LT); 3.9x3.5cm (ANT TO ENDO)     Past Surgical History:   Procedure Laterality Date    ENDOMETRIAL ABLATION      HYSTERECTOMY      fibroids    LITHOTRIPSY       Family History   Problem Relation Age of Onset    Diabetes Father      Social History     Tobacco Use    Smoking status: Never Smoker    Smokeless tobacco: Never Used   Substance Use Topics    Alcohol use: Yes     Comment: rarely    Drug use: No     OB History        4    Para   4    Term   4            AB        Living   4       SAB        IAB        Ectopic        Multiple        Live Births   4                 BP (!) 152/96   Ht 5' 3" (1.6 m)   Wt 90.4 kg (199 lb 4.7 oz)   BMI 35.30 kg/m²     ROS:  GENERAL: Denies weight gain or weight loss. Feeling well overall.   SKIN: Denies rash or lesions.   HEAD: Denies head injury or headache.   NODES: Denies enlarged lymph nodes.   CHEST: Denies chest pain or shortness of breath.   CARDIOVASCULAR: Denies palpitations or left sided chest pain.   ABDOMEN: No abdominal pain, constipation, diarrhea, nausea, vomiting or rectal bleeding. "   URINARY: No frequency, dysuria, hematuria, or burning on urination.  REPRODUCTIVE: See HPI.   BREASTS: The patient performs breast self-examination and denies pain, lumps, or nipple discharge.   HEMATOLOGIC: No easy bruisability or excessive bleeding.   MUSCULOSKELETAL: Denies joint pain or swelling.   NEUROLOGIC: Denies syncope or weakness.   PSYCHIATRIC: Denies depression, anxiety or mood swings.    PE:   APPEARANCE: Well nourished, well developed, in no acute distress.  AFFECT: WNL, alert and oriented x 3.  SKIN: No acne or hirsutism.  NECK: Neck symmetric without masses or thyromegaly.  NODES: No inguinal, cervical, axillary or femoral lymph node enlargement.  CHEST: Good respiratory effort.   ABDOMEN: Soft. No tenderness or masses. No hepatosplenomegaly. No hernias.  BREASTS: Symmetrical, no skin changes or visible lesions. No palpable masses, nipple discharge bilaterally.  PELVIC: Normal external female genitalia without lesions. Normal hair distribution. Adequate perineal body, normal urethral meatus. Vagina atrophic without lesions or discharge. No significant cystocele or rectocele. Bimanual exam shows uterus and cervix to be surgically absent. Adnexa without masses or tenderness.      1. Encounter for gynecological examination without abnormal finding     2. Encounter for screening mammogram for breast cancer  Mammo Digital Screening Bilat w/ Kyle    and PLAN:    Patient was counseled today on A.C.S. Pap guidelines and recommendations for yearly pelvic exams, mammograms and monthly self breast exams; to see her PCP for other health maintenance.

## 2022-12-30 ENCOUNTER — OFFICE VISIT (OUTPATIENT)
Dept: OBSTETRICS AND GYNECOLOGY | Facility: CLINIC | Age: 52
End: 2022-12-30
Payer: MEDICAID

## 2022-12-30 VITALS
BODY MASS INDEX: 34.64 KG/M2 | DIASTOLIC BLOOD PRESSURE: 88 MMHG | SYSTOLIC BLOOD PRESSURE: 132 MMHG | WEIGHT: 195.56 LBS

## 2022-12-30 DIAGNOSIS — N89.8 VAGINAL DISCHARGE: Primary | ICD-10-CM

## 2022-12-30 PROCEDURE — 3079F DIAST BP 80-89 MM HG: CPT | Mod: CPTII,,, | Performed by: NURSE PRACTITIONER

## 2022-12-30 PROCEDURE — 3079F PR MOST RECENT DIASTOLIC BLOOD PRESSURE 80-89 MM HG: ICD-10-PCS | Mod: CPTII,,, | Performed by: NURSE PRACTITIONER

## 2022-12-30 PROCEDURE — 1159F PR MEDICATION LIST DOCUMENTED IN MEDICAL RECORD: ICD-10-PCS | Mod: CPTII,,, | Performed by: NURSE PRACTITIONER

## 2022-12-30 PROCEDURE — 3008F BODY MASS INDEX DOCD: CPT | Mod: CPTII,,, | Performed by: NURSE PRACTITIONER

## 2022-12-30 PROCEDURE — 99213 PR OFFICE/OUTPT VISIT, EST, LEVL III, 20-29 MIN: ICD-10-PCS | Mod: S$PBB,,, | Performed by: NURSE PRACTITIONER

## 2022-12-30 PROCEDURE — 99999 PR PBB SHADOW E&M-EST. PATIENT-LVL III: CPT | Mod: PBBFAC,,, | Performed by: NURSE PRACTITIONER

## 2022-12-30 PROCEDURE — 1159F MED LIST DOCD IN RCRD: CPT | Mod: CPTII,,, | Performed by: NURSE PRACTITIONER

## 2022-12-30 PROCEDURE — 99999 PR PBB SHADOW E&M-EST. PATIENT-LVL III: ICD-10-PCS | Mod: PBBFAC,,, | Performed by: NURSE PRACTITIONER

## 2022-12-30 PROCEDURE — 1160F PR REVIEW ALL MEDS BY PRESCRIBER/CLIN PHARMACIST DOCUMENTED: ICD-10-PCS | Mod: CPTII,,, | Performed by: NURSE PRACTITIONER

## 2022-12-30 PROCEDURE — 3075F SYST BP GE 130 - 139MM HG: CPT | Mod: CPTII,,, | Performed by: NURSE PRACTITIONER

## 2022-12-30 PROCEDURE — 99213 OFFICE O/P EST LOW 20 MIN: CPT | Mod: PBBFAC | Performed by: NURSE PRACTITIONER

## 2022-12-30 PROCEDURE — 3075F PR MOST RECENT SYSTOLIC BLOOD PRESS GE 130-139MM HG: ICD-10-PCS | Mod: CPTII,,, | Performed by: NURSE PRACTITIONER

## 2022-12-30 PROCEDURE — 99213 OFFICE O/P EST LOW 20 MIN: CPT | Mod: S$PBB,,, | Performed by: NURSE PRACTITIONER

## 2022-12-30 PROCEDURE — 1160F RVW MEDS BY RX/DR IN RCRD: CPT | Mod: CPTII,,, | Performed by: NURSE PRACTITIONER

## 2022-12-30 PROCEDURE — 81514 NFCT DS BV&VAGINITIS DNA ALG: CPT | Performed by: NURSE PRACTITIONER

## 2022-12-30 PROCEDURE — 3008F PR BODY MASS INDEX (BMI) DOCUMENTED: ICD-10-PCS | Mod: CPTII,,, | Performed by: NURSE PRACTITIONER

## 2022-12-30 RX ORDER — FLUTICASONE PROPIONATE 50 MCG
SPRAY, SUSPENSION (ML) NASAL DAILY PRN
COMMUNITY
Start: 2022-07-19

## 2022-12-30 RX ORDER — AMLODIPINE BESYLATE 5 MG/1
10 TABLET ORAL
COMMUNITY
Start: 2022-09-14

## 2022-12-30 NOTE — PROGRESS NOTES
CC: Vaginal discharge    Rosibel Herrera is a 52 y.o. female  presents with complaint of vaginal discharge for 4 weeks.  She denies itching.  reports odor.  She states the discharge is white.      Past Medical History:   Diagnosis Date    Calculus of kidney 2014 2:20:53 PM    [facility] Historical - Urology: KidneyStones-No Additional Notes    Complications affecting other specified body systems, hypertension 3/26/2014 1:38:44 PM    [facility] Historical - Cardiovascular: Hypertension-No Additional Notes    Excessive or frequent menstruation 3/20/2014 9:22:15 AM    [facility]: Bleeding, Menorrhagia-No Additional Notes    Hypertension     Leiomyoma of uterus 3/20/2014 9:23:21 AM    [facility]: Fibroids/Leiomyoma of Uterus-2.1x1.8cm (POST/LT); 3.9x3.5cm (ANT TO ENDO)     Past Surgical History:   Procedure Laterality Date    ENDOMETRIAL ABLATION      HYSTERECTOMY      fibroids    LITHOTRIPSY       Social History     Tobacco Use    Smoking status: Never    Smokeless tobacco: Never   Substance Use Topics    Alcohol use: Yes     Comment: rarely    Drug use: No     Family History   Problem Relation Age of Onset    Diabetes Father      OB History    Para Term  AB Living   4 4 4     4   SAB IAB Ectopic Multiple Live Births           4      # Outcome Date GA Lbr Marcus/2nd Weight Sex Delivery Anes PTL Lv   4 Term     F Vag-Spont None  DANIEL   3 Term     F Vag-Spont None  DANIEL   2 Term     M Vag-Spont None  DANIEL   1 Term     M Vag-Spont None  DANIEL       /88   Wt 88.7 kg (195 lb 8.8 oz)   BMI 34.64 kg/m²     ROS:    Review of Systems   Constitutional:  Negative for appetite change, fatigue and fever.   Gastrointestinal:  Negative for abdominal pain, bloating, constipation, diarrhea, nausea and vomiting.   Genitourinary:  Positive for vaginal discharge and vaginal odor. Negative for bladder incontinence, dysmenorrhea, dyspareunia, dysuria, flank  pain, frequency, genital sores, menorrhagia, menstrual problem, pelvic pain, urgency, vaginal bleeding, vaginal pain, postcoital bleeding and vaginal dryness.   All other systems reviewed and are negative.        PHYSICAL EXAM:     Physical Exam:   Constitutional: She is oriented to person, place, and time. She appears well-developed and well-nourished.    HENT:   Head: Normocephalic and atraumatic.   Nose: Nose normal.    Eyes: Pupils are equal, round, and reactive to light. Conjunctivae and EOM are normal.     Cardiovascular:  Normal rate and regular rhythm.             Pulmonary/Chest: Effort normal.        Abdominal: Soft. She exhibits no distension. There is no abdominal tenderness. Hernia confirmed negative in the right inguinal area and confirmed negative in the left inguinal area.     Genitourinary:    Inguinal canal, right adnexa, left adnexa and rectum normal.      Pelvic exam was performed with patient supine.   The external female genitalia was normal.   Genitalia hair distrobution normal .   Labial bartholins normal.There is no rash, tenderness, lesion or injury on the right labia. There is no rash, tenderness, lesion or injury on the left labia. Right adnexum displays no mass, no tenderness and no fullness. Left adnexum displays no mass, no tenderness and no fullness. Vaginal cuff normal.  There is vaginal discharge (white, thin) in the vagina. No erythema, rectocele or cystocele in the vagina. Cervix is absent.Uterus is absent.           Musculoskeletal: Normal range of motion and moves all extremeties.      Lymphadenopathy: No inguinal adenopathy noted on the right or left side.    Neurological: She is alert and oriented to person, place, and time.    Skin: Skin is warm and dry. She is not diaphoretic.    Psychiatric: She has a normal mood and affect. Her behavior is normal. Judgment and thought content normal.         ASSESSMENT and PLAN:  Rosibel was seen today for vaginal discharge.    Diagnoses and  all orders for this visit:    Vaginal discharge  -     Vaginosis Screen by DNA Probe        Patient was counseled today on vaginitis prevention including :  a. avoiding feminine products such as deoderant soaps, body wash, bubble bath, douches, scented toilet paper, deoderant tampons or pads, feminine wipes, chronic pad use, etc.  b. avoiding other vulvovaginal irritants such as long hot baths, humidity, tight, synthetic clothing, chlorine and sitting around in wet bathing suits  c. wearing cotton underwear, avoiding thong underwear and no underwear to bed  d. taking showers instead of baths and use a hair dryer on cool setting afterwards to dry  e. wearing cotton to exercise and shower immediately after exercise and change clothes  f. using polyurethane condoms without spermicide if sexually active and symptoms are triggered by intercourse

## 2023-01-04 LAB
BACTERIAL VAGINOSIS DNA: POSITIVE
CANDIDA GLABRATA DNA: NEGATIVE
CANDIDA KRUSEI DNA: NEGATIVE
CANDIDA RRNA VAG QL PROBE: NEGATIVE
T VAGINALIS RRNA GENITAL QL PROBE: NEGATIVE

## 2023-01-05 ENCOUNTER — PATIENT MESSAGE (OUTPATIENT)
Dept: OBSTETRICS AND GYNECOLOGY | Facility: CLINIC | Age: 53
End: 2023-01-05
Payer: MEDICAID

## 2023-01-06 RX ORDER — METRONIDAZOLE 500 MG/1
500 TABLET ORAL 2 TIMES DAILY
Qty: 14 TABLET | Refills: 0 | Status: SHIPPED | OUTPATIENT
Start: 2023-01-06 | End: 2023-01-13

## 2023-03-13 ENCOUNTER — PATIENT MESSAGE (OUTPATIENT)
Dept: PAIN MEDICINE | Facility: CLINIC | Age: 53
End: 2023-03-13
Payer: MEDICAID

## 2024-02-01 ENCOUNTER — HOSPITAL ENCOUNTER (OUTPATIENT)
Dept: RADIOLOGY | Facility: HOSPITAL | Age: 54
Discharge: HOME | End: 2024-02-01
Attending: FAMILY MEDICINE
Payer: COMMERCIAL

## 2024-02-01 DIAGNOSIS — Z12.31 ENCOUNTER FOR SCREENING MAMMOGRAM FOR MALIGNANT NEOPLASM OF BREAST: ICD-10-CM

## 2024-02-01 PROCEDURE — 77067 SCR MAMMO BI INCL CAD: CPT | Mod: TC

## 2024-02-01 PROCEDURE — 77067 SCR MAMMO BI INCL CAD: CPT | Mod: 26,,, | Performed by: RADIOLOGY

## 2024-02-01 PROCEDURE — 77063 BREAST TOMOSYNTHESIS BI: CPT | Mod: 26,,, | Performed by: RADIOLOGY

## 2024-02-21 ENCOUNTER — OFFICE VISIT (OUTPATIENT)
Dept: DERMATOLOGY | Facility: CLINIC | Age: 54
End: 2024-02-21
Payer: COMMERCIAL

## 2024-02-21 DIAGNOSIS — L70.0 CYSTIC ACNE: Primary | ICD-10-CM

## 2024-02-21 DIAGNOSIS — L81.9 DYSCHROMIA: ICD-10-CM

## 2024-02-21 DIAGNOSIS — L81.9 PERIORAL HYPERPIGMENTATION: ICD-10-CM

## 2024-02-21 PROCEDURE — 11900 INJECT SKIN LESIONS </W 7: CPT | Mod: S$GLB,,, | Performed by: DERMATOLOGY

## 2024-02-21 PROCEDURE — 1160F RVW MEDS BY RX/DR IN RCRD: CPT | Mod: CPTII,S$GLB,, | Performed by: DERMATOLOGY

## 2024-02-21 PROCEDURE — 1159F MED LIST DOCD IN RCRD: CPT | Mod: CPTII,S$GLB,, | Performed by: DERMATOLOGY

## 2024-02-21 PROCEDURE — 99204 OFFICE O/P NEW MOD 45 MIN: CPT | Mod: 25,S$GLB,, | Performed by: DERMATOLOGY

## 2024-02-21 PROCEDURE — 99999 PR PBB SHADOW E&M-EST. PATIENT-LVL III: CPT | Mod: PBBFAC,,, | Performed by: DERMATOLOGY

## 2024-02-21 RX ORDER — CLINDAMYCIN AND BENZOYL PEROXIDE 10; 50 MG/G; MG/G
GEL TOPICAL 2 TIMES DAILY
Qty: 50 G | Refills: 5 | Status: SHIPPED | OUTPATIENT
Start: 2024-02-21 | End: 2024-05-31

## 2024-02-21 RX ORDER — DOXYCYCLINE 100 MG/1
CAPSULE ORAL
Qty: 30 CAPSULE | Refills: 1 | Status: SHIPPED | OUTPATIENT
Start: 2024-02-21

## 2024-02-21 RX ORDER — HYDROQUINONE 40 MG/G
CREAM TOPICAL
Qty: 28 G | Refills: 1 | Status: SHIPPED | OUTPATIENT
Start: 2024-02-21 | End: 2024-02-21 | Stop reason: SDUPTHER

## 2024-02-21 RX ORDER — HYDROQUINONE 40 MG/G
CREAM TOPICAL
Qty: 28 G | Refills: 1 | Status: SHIPPED | OUTPATIENT
Start: 2024-02-21 | End: 2024-05-31

## 2024-02-21 NOTE — PROGRESS NOTES
Subjective:      Patient ID:  Rosibel Herrera is a 54 y.o. female who presents for   Chief Complaint   Patient presents with    Cyst     History of Present Illness: The patient presents with chief complaint of several cyst.  Location: chin, cheeks, left forearm   Duration: spot on chin has been there for 3-4 months.  Signs/Symptoms: raised bumps, no pain or itching, no drainage.     Prior treatments: s/p I&D to right cheek and chin      Review of Systems   Constitutional:  Negative for fever and chills.   Gastrointestinal:  Negative for nausea and vomiting.   Skin:  Positive for activity-related sunscreen use. Negative for daily sunscreen use and recent sunburn.   Hematologic/Lymphatic: Does not bruise/bleed easily.       Objective:   Physical Exam   Constitutional: She appears well-developed and well-nourished. No distress.   Neurological: She is alert and oriented to person, place, and time. She is not disoriented.   Psychiatric: She has a normal mood and affect.   Skin:   Areas Examined (abnormalities noted in diagram):   Head / Face Inspection Performed  Neck Inspection Performed  RUE Inspected  LUE Inspection Performed  Nails and Digits Inspection Performed                    Assessment / Plan:        Cystic acne  -     doxycycline (MONODOX) 100 MG capsule; Take once daily with food.  May cause upset stomach.  Dispense: 30 capsule; Refill: 1  -     clindamycin-benzoyl peroxide (BENZACLIN) gel; Apply topically 2 (two) times daily. For acne  Dispense: 50 g; Refill: 5  -     triamcinolone acetonide injection 5 mg  -     vs. EIC vs. Keloidal scar of the submental chin and right cheek.  S/p I&D at outside facility. ILK #1 done today. If fails to improve, consider plastics referral for excision. The patient acknowledged understanding. Will add doxy and benzaclin for acne component.     Side effect profile of doxy reviewed including increased sun sensitivity and upset stomach.    ILK #1 done today. After risks,  benefits and alternatives explained and side effect profile reviewed, including hypopigmentation, telangiectasia and atrophy, the patient verbally consented to ILK injection. A total of 0.4 cc of kenalog 5 mg/ml was injected into the cystic nodules of the submental chin and right cheek for < 7 sites/lesions.  Pt tolerated well without side effects.  RTC in 4 weeks for repeat injection.    Dyschromia  Perioral hyperpigmentation  -     hydroquinone 4 % Crea; Apply to dark spots once daily. Use with sunscreen if outdoors  Dispense: 28 g; Refill: 1  -     of periorbital area, discussed optimization of ENT/sinus tx.  Will start HQ 8% with daily sunscreen.          Follow up if symptoms worsen or fail to improve.

## 2024-05-30 NOTE — PROGRESS NOTES
Subjective     Patient ID: Rosibel Herrera is a 54 y.o. female.    Chief Complaint: Establish Care and Annual Exam      HPI  Patient presents to establish care. She reports she has been having hormone fluctuations at the end of the month. She attributes this to menopause. She also notes an increase in acne. She reports Her A1c has not been checked in a while. She reports she has sinus issues, causing her to breath through her mouth, she now has dry mouth.  Dry mouth, sinuses  Forgot to take BP meds  Review of Systems   Constitutional:  Positive for fatigue. Negative for chills.   HENT:  Positive for mouth dryness.    Respiratory:  Negative for chest tightness and shortness of breath.    Cardiovascular:  Negative for chest pain and palpitations.   Gastrointestinal:  Negative for abdominal pain, constipation, diarrhea, nausea and vomiting.   Genitourinary:  Negative for frequency and urgency.   Integumentary:         acne   Neurological:  Negative for dizziness, numbness and headaches.          Objective       Current Outpatient Medications:     amLODIPine (NORVASC) 5 MG tablet, Take 10 mg by mouth., Disp: , Rfl:     azelastine (ASTELIN) 137 mcg (0.1 %) nasal spray, 2 sprays by Nasal route 2 (two) times daily., Disp: , Rfl:     cholecalciferol, vitamin D3, 1,250 mcg (50,000 unit) capsule, Take 50,000 Units by mouth every 7 days., Disp: , Rfl:     fluticasone propionate (FLONASE) 50 mcg/actuation nasal spray, by Each Nostril route daily as needed., Disp: , Rfl:     losartan-hydrochlorothiazide 50-12.5 mg (HYZAAR) 50-12.5 mg per tablet, Take 1 tablet by mouth once daily., Disp: , Rfl:     metFORMIN (GLUCOPHAGE) 500 MG tablet, Take 500 mg by mouth 2 (two) times daily with meals., Disp: , Rfl:     montelukast (SINGULAIR) 10 mg tablet, Take 10 mg by mouth once daily., Disp: , Rfl:     doxycycline (MONODOX) 100 MG capsule, Take once daily with food.  May cause upset stomach., Disp: 30 capsule, Rfl: 1     Physical  Exam  Constitutional:       General: She is not in acute distress.     Appearance: Normal appearance. She is normal weight.   HENT:      Head: Normocephalic and atraumatic.   Cardiovascular:      Rate and Rhythm: Normal rate and regular rhythm.      Heart sounds: Normal heart sounds. No murmur heard.     No friction rub. No gallop.   Pulmonary:      Effort: Pulmonary effort is normal.      Breath sounds: Normal breath sounds. No wheezing, rhonchi or rales.   Abdominal:      General: Bowel sounds are normal. There is no distension.      Palpations: Abdomen is soft.      Tenderness: There is no abdominal tenderness. There is no rebound.   Skin:     General: Skin is warm and dry.      Coloration: Skin is not jaundiced.   Neurological:      General: No focal deficit present.      Mental Status: She is alert and oriented to person, place, and time. Mental status is at baseline.   Psychiatric:         Mood and Affect: Mood normal.         Behavior: Behavior normal.            Assessment and Plan     1. Type 2 diabetes mellitus without complication, without long-term current use of insulin  Comments:  Chronic. Patient has adjusted diet. Not taking Metformin. Will obtain A1c and urine microalbumin/creatinine ratio. Will refer to dietician  Orders:  -     Microalbumin/Creatinine Ratio, Urine  -     Hemoglobin A1C; Future; Expected date: 05/31/2024  -     Ambulatory referral/consult to Nutrition Services; Future; Expected date: 06/07/2024    2. Annual physical exam  Comments:  Will obtain A1c, TSH, CBC, CMP and lipid panel for regular health monitoring  Orders:  -     CBC Auto Differential; Future; Expected date: 05/31/2024  -     Comprehensive Metabolic Panel; Future; Expected date: 05/31/2024  -     Lipid Panel; Future; Expected date: 05/31/2024  -     TSH; Future; Expected date: 05/31/2024    3. Primary hypertension  Comments:  Chronic. Blood pressure elevated today but patient did forget to take her medication. Will  obtain CBC and CMP for medication monitoring               No follow-ups on file.

## 2024-05-31 ENCOUNTER — OFFICE VISIT (OUTPATIENT)
Dept: FAMILY MEDICINE | Facility: CLINIC | Age: 54
End: 2024-05-31
Payer: COMMERCIAL

## 2024-05-31 VITALS
OXYGEN SATURATION: 99 % | DIASTOLIC BLOOD PRESSURE: 90 MMHG | RESPIRATION RATE: 18 BRPM | SYSTOLIC BLOOD PRESSURE: 140 MMHG | HEART RATE: 74 BPM | WEIGHT: 174.19 LBS | BODY MASS INDEX: 30.85 KG/M2 | TEMPERATURE: 98 F

## 2024-05-31 DIAGNOSIS — Z00.00 ANNUAL PHYSICAL EXAM: ICD-10-CM

## 2024-05-31 DIAGNOSIS — E11.9 TYPE 2 DIABETES MELLITUS WITHOUT COMPLICATION, WITHOUT LONG-TERM CURRENT USE OF INSULIN: Primary | ICD-10-CM

## 2024-05-31 DIAGNOSIS — I10 PRIMARY HYPERTENSION: ICD-10-CM

## 2024-05-31 PROBLEM — E78.5 DYSLIPIDEMIA: Status: ACTIVE | Noted: 2023-01-05

## 2024-05-31 PROBLEM — N20.0 KIDNEY STONE: Status: ACTIVE | Noted: 2024-04-05

## 2024-05-31 PROBLEM — E11.21 DIABETIC NEPHROPATHY WITH PROTEINURIA: Status: ACTIVE | Noted: 2023-02-23

## 2024-05-31 PROBLEM — E55.9 VITAMIN D DEFICIENCY: Status: ACTIVE | Noted: 2024-02-04

## 2024-05-31 PROBLEM — H81.10 BENIGN PAROXYSMAL POSITIONAL VERTIGO: Status: ACTIVE | Noted: 2024-02-13

## 2024-05-31 PROCEDURE — 3008F BODY MASS INDEX DOCD: CPT | Mod: CPTII,S$GLB,, | Performed by: INTERNAL MEDICINE

## 2024-05-31 PROCEDURE — 99999 PR PBB SHADOW E&M-EST. PATIENT-LVL IV: CPT | Mod: PBBFAC,,, | Performed by: INTERNAL MEDICINE

## 2024-05-31 PROCEDURE — 1159F MED LIST DOCD IN RCRD: CPT | Mod: CPTII,S$GLB,, | Performed by: INTERNAL MEDICINE

## 2024-05-31 PROCEDURE — 3077F SYST BP >= 140 MM HG: CPT | Mod: CPTII,S$GLB,, | Performed by: INTERNAL MEDICINE

## 2024-05-31 PROCEDURE — 99386 PREV VISIT NEW AGE 40-64: CPT | Mod: S$GLB,,, | Performed by: INTERNAL MEDICINE

## 2024-05-31 PROCEDURE — 3080F DIAST BP >= 90 MM HG: CPT | Mod: CPTII,S$GLB,, | Performed by: INTERNAL MEDICINE

## 2024-05-31 RX ORDER — MONTELUKAST SODIUM 10 MG/1
10 TABLET ORAL DAILY
COMMUNITY

## 2024-05-31 RX ORDER — LOSARTAN POTASSIUM AND HYDROCHLOROTHIAZIDE 25; 100 MG/1; MG/1
1 TABLET ORAL DAILY
COMMUNITY
Start: 2024-05-28 | End: 2024-05-31

## 2024-05-31 RX ORDER — ASPIRIN 325 MG
50000 TABLET, DELAYED RELEASE (ENTERIC COATED) ORAL
COMMUNITY
Start: 2024-02-05

## 2024-05-31 RX ORDER — MECLIZINE HYDROCHLORIDE 25 MG/1
25 TABLET ORAL 3 TIMES DAILY PRN
COMMUNITY
Start: 2024-02-14 | End: 2024-05-31

## 2024-05-31 RX ORDER — METFORMIN HYDROCHLORIDE 500 MG/1
500 TABLET ORAL 2 TIMES DAILY WITH MEALS
COMMUNITY

## 2024-05-31 RX ORDER — AZELASTINE 1 MG/ML
2 SPRAY, METERED NASAL 2 TIMES DAILY
COMMUNITY

## 2024-06-03 ENCOUNTER — LAB VISIT (OUTPATIENT)
Dept: LAB | Facility: HOSPITAL | Age: 54
End: 2024-06-03
Attending: INTERNAL MEDICINE
Payer: COMMERCIAL

## 2024-06-03 ENCOUNTER — TELEPHONE (OUTPATIENT)
Dept: FAMILY MEDICINE | Facility: CLINIC | Age: 54
End: 2024-06-03
Payer: COMMERCIAL

## 2024-06-03 DIAGNOSIS — E11.9 TYPE 2 DIABETES MELLITUS WITHOUT COMPLICATION, WITHOUT LONG-TERM CURRENT USE OF INSULIN: ICD-10-CM

## 2024-06-03 DIAGNOSIS — Z00.00 ANNUAL PHYSICAL EXAM: ICD-10-CM

## 2024-06-03 LAB
ALBUMIN SERPL BCP-MCNC: 2.9 G/DL (ref 3.5–5.2)
ALP SERPL-CCNC: 100 U/L (ref 55–135)
ALT SERPL W/O P-5'-P-CCNC: 20 U/L (ref 10–44)
ANION GAP SERPL CALC-SCNC: 10 MMOL/L (ref 8–16)
AST SERPL-CCNC: 24 U/L (ref 10–40)
BASOPHILS # BLD AUTO: 0.05 K/UL (ref 0–0.2)
BASOPHILS NFR BLD: 0.9 % (ref 0–1.9)
BILIRUB SERPL-MCNC: 0.4 MG/DL (ref 0.1–1)
BUN SERPL-MCNC: 21 MG/DL (ref 6–20)
CALCIUM SERPL-MCNC: 12.1 MG/DL (ref 8.7–10.5)
CHLORIDE SERPL-SCNC: 100 MMOL/L (ref 95–110)
CHOLEST SERPL-MCNC: 204 MG/DL (ref 120–199)
CHOLEST/HDLC SERPL: 6.2 {RATIO} (ref 2–5)
CO2 SERPL-SCNC: 25 MMOL/L (ref 23–29)
CREAT SERPL-MCNC: 1.5 MG/DL (ref 0.5–1.4)
DIFFERENTIAL METHOD BLD: ABNORMAL
EOSINOPHIL # BLD AUTO: 0.4 K/UL (ref 0–0.5)
EOSINOPHIL NFR BLD: 6.2 % (ref 0–8)
ERYTHROCYTE [DISTWIDTH] IN BLOOD BY AUTOMATED COUNT: 14 % (ref 11.5–14.5)
EST. GFR  (NO RACE VARIABLE): 40.6 ML/MIN/1.73 M^2
ESTIMATED AVG GLUCOSE: 131 MG/DL (ref 68–131)
GLUCOSE SERPL-MCNC: 165 MG/DL (ref 70–110)
HBA1C MFR BLD: 6.2 % (ref 4–5.6)
HCT VFR BLD AUTO: 42.2 % (ref 37–48.5)
HDLC SERPL-MCNC: 33 MG/DL (ref 40–75)
HDLC SERPL: 16.2 % (ref 20–50)
HGB BLD-MCNC: 13.4 G/DL (ref 12–16)
IMM GRANULOCYTES # BLD AUTO: 0.01 K/UL (ref 0–0.04)
IMM GRANULOCYTES NFR BLD AUTO: 0.2 % (ref 0–0.5)
LDLC SERPL CALC-MCNC: 133.4 MG/DL (ref 63–159)
LYMPHOCYTES # BLD AUTO: 1.5 K/UL (ref 1–4.8)
LYMPHOCYTES NFR BLD: 26 % (ref 18–48)
MCH RBC QN AUTO: 29 PG (ref 27–31)
MCHC RBC AUTO-ENTMCNC: 31.8 G/DL (ref 32–36)
MCV RBC AUTO: 91 FL (ref 82–98)
MONOCYTES # BLD AUTO: 0.8 K/UL (ref 0.3–1)
MONOCYTES NFR BLD: 13.3 % (ref 4–15)
NEUTROPHILS # BLD AUTO: 3.1 K/UL (ref 1.8–7.7)
NEUTROPHILS NFR BLD: 53.4 % (ref 38–73)
NONHDLC SERPL-MCNC: 171 MG/DL
NRBC BLD-RTO: 0 /100 WBC
PLATELET # BLD AUTO: 357 K/UL (ref 150–450)
PMV BLD AUTO: 11.4 FL (ref 9.2–12.9)
POTASSIUM SERPL-SCNC: 4.1 MMOL/L (ref 3.5–5.1)
PROT SERPL-MCNC: 11 G/DL (ref 6–8.4)
RBC # BLD AUTO: 4.62 M/UL (ref 4–5.4)
SODIUM SERPL-SCNC: 135 MMOL/L (ref 136–145)
TRIGL SERPL-MCNC: 188 MG/DL (ref 30–150)
TSH SERPL DL<=0.005 MIU/L-ACNC: 2.02 UIU/ML (ref 0.4–4)
WBC # BLD AUTO: 5.78 K/UL (ref 3.9–12.7)

## 2024-06-03 PROCEDURE — 83036 HEMOGLOBIN GLYCOSYLATED A1C: CPT | Performed by: INTERNAL MEDICINE

## 2024-06-03 PROCEDURE — 36415 COLL VENOUS BLD VENIPUNCTURE: CPT | Mod: PO | Performed by: INTERNAL MEDICINE

## 2024-06-03 PROCEDURE — 85025 COMPLETE CBC W/AUTO DIFF WBC: CPT | Performed by: INTERNAL MEDICINE

## 2024-06-03 PROCEDURE — 80053 COMPREHEN METABOLIC PANEL: CPT | Performed by: INTERNAL MEDICINE

## 2024-06-03 PROCEDURE — 80061 LIPID PANEL: CPT | Performed by: INTERNAL MEDICINE

## 2024-06-03 PROCEDURE — 84443 ASSAY THYROID STIM HORMONE: CPT | Performed by: INTERNAL MEDICINE

## 2024-06-04 DIAGNOSIS — N18.32 STAGE 3B CHRONIC KIDNEY DISEASE: Primary | ICD-10-CM

## 2024-06-04 DIAGNOSIS — E87.8 ELECTROLYTE IMBALANCE: ICD-10-CM

## 2024-06-04 NOTE — TELEPHONE ENCOUNTER
Call from lab even though I'm covering [place]. High calcium and some renal insufficiency so hypercalcemia may be a real thing causing diuresis. She is on HCTZ but prob needs workup with PTH, Vit D etc. And she is listed as taking 50,000 D3 but no vit D level so could be excess. Glucose high, no A1c on file so may be seeing endocrine outside [facility]?    Parihs Cristobal MD

## 2024-06-05 ENCOUNTER — PATIENT MESSAGE (OUTPATIENT)
Dept: FAMILY MEDICINE | Facility: CLINIC | Age: 54
End: 2024-06-05
Payer: COMMERCIAL

## 2024-06-11 ENCOUNTER — TELEPHONE (OUTPATIENT)
Dept: NEPHROLOGY | Facility: CLINIC | Age: 54
End: 2024-06-11
Payer: COMMERCIAL

## 2024-06-11 DIAGNOSIS — N28.9 RENAL INSUFFICIENCY: Primary | ICD-10-CM

## 2024-06-11 NOTE — TELEPHONE ENCOUNTER
----- Message from Rosa Maria Lewis sent at 6/11/2024  3:08 PM CDT -----  Regarding: Labs  Good Afternoon, patient scheduled to see the doctor 6/25/24. Dr. Briana Vu ordered labs on 06/03/24, not sure if any of those will be what Dr. Alexis require. Patient can be reached at [phone] if more labs are needed. Thanks/elr

## 2024-06-12 NOTE — PROGRESS NOTES
The patient location is: [place]  The chief complaint leading to consultation is: nutrition referral    Visit type: audiovisual    Face to Face time with patient: 27 minutes  37 minutes of total time spent on the encounter, which includes face to face time and non-face to face time preparing to see the patient (eg, review of tests), Obtaining and/or reviewing separately obtained history, Documenting clinical information in the electronic or other health record, Independently interpreting results (not separately reported) and communicating results to the patient/family/caregiver, or Care coordination (not separately reported).         Each patient to whom he or she provides medical services by telemedicine is:  (1) informed of the relationship between the physician and patient and the respective role of any other health care provider with respect to management of the patient; and (2) notified that he or she may decline to receive medical services by telemedicine and may withdraw from such care at any time.    Notes:   Nutrition Assessment      Client name:  Rosibel Herrera  :  1970  Age:  54 y.o.  Gender:  female    Client states:  referred by Briana Vu DO with a diagnosis of:   -Type 2 diabetes mellitus without complication, without long-term current use of insulin     Diagnosed with diabetes 2 years ago.  Improved A1c from 12.1 to 6.2  Wants to work towards diabetes management without the help of medication now.  Stopped Metformin 1 week ago.  Fasting glucose has been in the 90s.  When checking glucose 2 hours after meals, reports normal range.      Found out kidneys are not functioning as they should.  Labs showed she was dehydrated.     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 Pepper)    Issue area: skipping meals due to not being hungry    Uses: Low sodium seasonings, Onions, Garlic.   Limited sandwich meat.  Limited intake of processed foods.    Anthropometrics  Height:  63"          RECENT WEIGHTS      Weight (lb) Weight (kg) BMI (Calculated)   2/10/2022 199.3  90.4  35.3    12/30/2022 195.55  88.7     5/31/2024 174.16  79           Clinical Signs/Symptoms  N/V/D:  none noted  Appetite:  good       Past Medical History:   Diagnosis Date    Calculus of kidney 4/9/2014 2:20:53 PM    [facility] Historical - Urology: KidneyStones-No Additional Notes    Complications affecting other specified body systems, hypertension 3/26/2014 1:38:44 PM    [facility] Historical - Cardiovascular: Hypertension-No Additional Notes    Excessive or frequent menstruation 3/20/2014 9:22:15 AM    [facility] - [facility]: Bleeding, Menorrhagia-No Additional Notes    Hypertension     Leiomyoma of uterus 3/20/2014 9:23:21 AM    [facility] Historical - [facility]: Fibroids/Leiomyoma of Uterus-2.1x1.8cm (POST/LT); 3.9x3.5cm (ANT TO ENDO)       Past Surgical History:   Procedure Laterality Date    ENDOMETRIAL ABLATION      HYSTERECTOMY      fibroids    LITHOTRIPSY         Medications    has a current medication list which includes the following prescription(s): amlodipine, azelastine, cholecalciferol (vitamin d3), doxycycline, fluticasone propionate, losartan-hydrochlorothiazide 50-12.5 mg, metformin, and montelukast.    Vitamins, Minerals, and/or Supplements:  not discussed     Food/Medication Interactions:  Reviewed     Food Allergies or Intolerances:  NKFA noted in chart "     Social History    Marital status:    Employment:  yes    Social History     Tobacco Use    Smoking status: Never    Smokeless tobacco: Never   Substance Use Topics    Alcohol use: Yes     Comment: rarely        Lab Reports   Sodium   Date Value Ref Range Status   06/03/2024 135 (L) 136 - 145 mmol/L Final     Potassium   Date Value Ref Range Status   06/03/2024 4.1 3.5 - 5.1 mmol/L Final     Chloride   Date Value Ref Range Status   06/03/2024 100 95 - 110 mmol/L Final     CO2   Date Value Ref Range Status   06/03/2024 25 23 - 29 mmol/L Final     Glucose   Date Value Ref Range Status   06/03/2024 165 (H) 70 - 110 mg/dL Final     BUN   Date Value Ref Range Status   06/03/2024 21 (H) 6 - 20 mg/dL Final     Creatinine   Date Value Ref Range Status   06/03/2024 1.5 (H) 0.5 - 1.4 mg/dL Final     Calcium   Date Value Ref Range Status   06/03/2024 12.1 (HH) 8.7 - 10.5 mg/dL Final     Comment:     *Critical value notification by KG with confirmation of receipt to   DR. YOLA COREAS at Date6/3 Tim10:29PM       Total Protein   Date Value Ref Range Status   06/03/2024 11.0 (H) 6.0 - 8.4 g/dL Final     Albumin   Date Value Ref Range Status   06/03/2024 2.9 (L) 3.5 - 5.2 g/dL Final     Total Bilirubin   Date Value Ref Range Status   06/03/2024 0.4 0.1 - 1.0 mg/dL Final     Comment:     For infants and newborns, interpretation of results should be based  on gestational age, weight and in agreement with clinical  observations.    Premature Infant recommended reference ranges:  Up to 24 hours.............<8.0 mg/dL  Up to 48 hours............<12.0 mg/dL  3-5 days..................<15.0 mg/dL  6-29 days.................<15.0 mg/dL       Alkaline Phosphatase   Date Value Ref Range Status   06/03/2024 100 55 - 135 U/L Final     AST   Date Value Ref Range Status   06/03/2024 24 10 - 40 U/L Final     ALT   Date Value Ref Range Status   06/03/2024 20 10 - 44 U/L Final     Anion Gap   Date Value Ref Range Status    06/03/2024 10 8 - 16 mmol/L Final     eGFR if    Date Value Ref Range Status   07/16/2021 60 >60 mL/min/1.73 m^2 Final     eGFR if non    Date Value Ref Range Status   07/16/2021 52 (A) >60 mL/min/1.73 m^2 Final     Comment:     Calculation used to obtain the estimated glomerular filtration  rate (eGFR) is the CKD-EPI equation.         Lab Results   Component Value Date    WBC 5.78 06/03/2024    HGB 13.4 06/03/2024    HCT 42.2 06/03/2024    MCV 91 06/03/2024     06/03/2024        Lab Results   Component Value Date    CHOL 204 (H) 06/03/2024     Lab Results   Component Value Date    HDL 33 (L) 06/03/2024     Lab Results   Component Value Date    LDLCALC 133.4 06/03/2024     Lab Results   Component Value Date    TRIG 188 (H) 06/03/2024     Lab Results   Component Value Date    CHOLHDL 16.2 (L) 06/03/2024     Lab Results   Component Value Date    HGBA1C 6.2 (H) 06/03/2024     BP Readings from Last 1 Encounters:   05/31/24 (!) 140/90       Food History  Provided above    Exercise History:  provided above    Cultural/Spiritual/Personal Preferences:  None identified    Support System:  family/friends    State of Change:  Contemplation    Barriers to Change:  none    Diagnosis    Food and nutrition related knowledge deficit related to no recent nutrition education as evidenced by seeking nutrition therapy for Type 2 diabetes mellitus.    Intervention    RMR (Method:  [place] St. Jeor):  1354 kcal  Activity Factor:  1.2    DARNE:  1624 kcal    Goals:  1.  Participate in movement/walking after meals.  2.  When consuming meals/snacks paid together protein + carbohydrates  3.  Eliminate intake of added sugars in beverages  4. Aim for 5 bottles of water per day  5. Continue with low sodium intake      Nutrition Education  The following education was provided to the patient:  Discussed meal planning/[employer]'s My Plate design.  Discussed healthy snacking.   Suggested dietary modifications based  on current dietary behaviors and individual food preferences.  Discussed importance of physical activity  Discussed nutrition therapy for diabetes    Patient verbalized understanding of nutrition education and recommendations received.    Handouts Provided  none    Monitoring/Evaluation    Monitor the following:  Weight  BMI  Caloric intake  Labs:  glucose, HgbA1c    Follow Up Plan:  as needed

## 2024-06-13 ENCOUNTER — CLINICAL SUPPORT (OUTPATIENT)
Dept: INTERNAL MEDICINE | Facility: CLINIC | Age: 54
End: 2024-06-13
Payer: COMMERCIAL

## 2024-06-13 DIAGNOSIS — E11.9 TYPE 2 DIABETES MELLITUS WITHOUT COMPLICATION, WITHOUT LONG-TERM CURRENT USE OF INSULIN: ICD-10-CM

## 2024-06-13 PROCEDURE — 97802 MEDICAL NUTRITION INDIV IN: CPT | Mod: 95,,, | Performed by: DIETITIAN, REGISTERED

## 2024-06-22 ENCOUNTER — HOSPITAL ENCOUNTER (INPATIENT)
Facility: HOSPITAL | Age: 54
LOS: 3 days | Discharge: HOME | DRG: 641 | End: 2024-06-26
Attending: EMERGENCY MEDICINE | Admitting: FAMILY MEDICINE
Payer: COMMERCIAL

## 2024-06-22 DIAGNOSIS — R07.9 CHEST PAIN: ICD-10-CM

## 2024-06-22 DIAGNOSIS — I10 PRIMARY HYPERTENSION: ICD-10-CM

## 2024-06-22 DIAGNOSIS — N17.9 ACUTE RENAL FAILURE, UNSPECIFIED ACUTE RENAL FAILURE TYPE: Primary | ICD-10-CM

## 2024-06-22 DIAGNOSIS — R53.1 WEAKNESS: ICD-10-CM

## 2024-06-22 DIAGNOSIS — E83.52 HYPERCALCEMIA: ICD-10-CM

## 2024-06-22 DIAGNOSIS — E11.9 TYPE 2 DIABETES MELLITUS WITHOUT COMPLICATION, WITHOUT LONG-TERM CURRENT USE OF INSULIN: ICD-10-CM

## 2024-06-22 DIAGNOSIS — E88.09 HYPOALBUMINEMIA: ICD-10-CM

## 2024-06-22 LAB
BACTERIA #/AREA URNS HPF: ABNORMAL /HPF
BILIRUB UR QL STRIP: NEGATIVE
CLARITY UR: ABNORMAL
COLOR UR: YELLOW
GLUCOSE UR QL STRIP: NEGATIVE
HGB UR QL STRIP: ABNORMAL
HYALINE CASTS #/AREA URNS LPF: 4 /LPF
KETONES UR QL STRIP: NEGATIVE
LEUKOCYTE ESTERASE UR QL STRIP: NEGATIVE
MICROSCOPIC COMMENT: ABNORMAL
NITRITE UR QL STRIP: NEGATIVE
PH UR STRIP: 7 [PH] (ref 5–8)
POCT GLUCOSE: 111 MG/DL (ref 70–110)
PROT UR QL STRIP: ABNORMAL
RBC #/AREA URNS HPF: 26 /HPF (ref 0–4)
SP GR UR STRIP: 1.01 (ref 1–1.03)
SQUAMOUS #/AREA URNS HPF: 11 /HPF
URN SPEC COLLECT METH UR: ABNORMAL
UROBILINOGEN UR STRIP-ACNC: NEGATIVE EU/DL
WBC #/AREA URNS HPF: 8 /HPF (ref 0–5)

## 2024-06-22 PROCEDURE — 81000 URINALYSIS NONAUTO W/SCOPE: CPT | Performed by: EMERGENCY MEDICINE

## 2024-06-22 PROCEDURE — 99285 EMERGENCY DEPT VISIT HI MDM: CPT | Mod: 25

## 2024-06-22 PROCEDURE — 25000003 PHARM REV CODE 250: Performed by: EMERGENCY MEDICINE

## 2024-06-22 PROCEDURE — G0378 HOSPITAL OBSERVATION PER HR: HCPCS

## 2024-06-22 PROCEDURE — 93010 ELECTROCARDIOGRAM REPORT: CPT | Mod: ,,, | Performed by: INTERNAL MEDICINE

## 2024-06-22 PROCEDURE — 96361 HYDRATE IV INFUSION ADD-ON: CPT

## 2024-06-22 PROCEDURE — 96360 HYDRATION IV INFUSION INIT: CPT

## 2024-06-22 PROCEDURE — 82962 GLUCOSE BLOOD TEST: CPT

## 2024-06-22 PROCEDURE — 93005 ELECTROCARDIOGRAM TRACING: CPT

## 2024-06-22 PROCEDURE — 25000003 PHARM REV CODE 250: Performed by: HOSPITALIST

## 2024-06-22 RX ORDER — INSULIN ASPART 100 [IU]/ML
0-5 INJECTION, SOLUTION INTRAVENOUS; SUBCUTANEOUS
Status: DISCONTINUED | OUTPATIENT
Start: 2024-06-22 | End: 2024-06-26 | Stop reason: HOSPADM

## 2024-06-22 RX ORDER — ACETAMINOPHEN 650 MG/1
650 SUPPOSITORY RECTAL EVERY 6 HOURS PRN
Status: DISCONTINUED | OUTPATIENT
Start: 2024-06-22 | End: 2024-06-26 | Stop reason: HOSPADM

## 2024-06-22 RX ORDER — ENOXAPARIN SODIUM 100 MG/ML
40 INJECTION SUBCUTANEOUS EVERY 24 HOURS
Status: DISCONTINUED | OUTPATIENT
Start: 2024-06-23 | End: 2024-06-26 | Stop reason: HOSPADM

## 2024-06-22 RX ORDER — SODIUM CHLORIDE 0.9 % (FLUSH) 0.9 %
10 SYRINGE (ML) INJECTION EVERY 12 HOURS PRN
Status: DISCONTINUED | OUTPATIENT
Start: 2024-06-22 | End: 2024-06-26 | Stop reason: HOSPADM

## 2024-06-22 RX ORDER — HYDRALAZINE HYDROCHLORIDE 20 MG/ML
10 INJECTION INTRAMUSCULAR; INTRAVENOUS EVERY 6 HOURS PRN
Status: DISCONTINUED | OUTPATIENT
Start: 2024-06-22 | End: 2024-06-23

## 2024-06-22 RX ORDER — AMLODIPINE BESYLATE 10 MG/1
10 TABLET ORAL DAILY
Status: DISCONTINUED | OUTPATIENT
Start: 2024-06-23 | End: 2024-06-26 | Stop reason: HOSPADM

## 2024-06-22 RX ORDER — TALC
6 POWDER (GRAM) TOPICAL NIGHTLY PRN
Status: DISCONTINUED | OUTPATIENT
Start: 2024-06-22 | End: 2024-06-26 | Stop reason: HOSPADM

## 2024-06-22 RX ORDER — ACETAMINOPHEN 325 MG/1
650 TABLET ORAL EVERY 8 HOURS PRN
Status: DISCONTINUED | OUTPATIENT
Start: 2024-06-22 | End: 2024-06-26 | Stop reason: HOSPADM

## 2024-06-22 RX ORDER — ALUMINUM HYDROXIDE, MAGNESIUM HYDROXIDE, AND SIMETHICONE 1200; 120; 1200 MG/30ML; MG/30ML; MG/30ML
30 SUSPENSION ORAL 4 TIMES DAILY PRN
Status: DISCONTINUED | OUTPATIENT
Start: 2024-06-22 | End: 2024-06-26 | Stop reason: HOSPADM

## 2024-06-22 RX ORDER — GLUCAGON 1 MG
1 KIT INJECTION
Status: DISCONTINUED | OUTPATIENT
Start: 2024-06-22 | End: 2024-06-26 | Stop reason: HOSPADM

## 2024-06-22 RX ORDER — PROMETHAZINE HYDROCHLORIDE 25 MG/1
25 TABLET ORAL EVERY 6 HOURS PRN
Status: DISCONTINUED | OUTPATIENT
Start: 2024-06-22 | End: 2024-06-26 | Stop reason: HOSPADM

## 2024-06-22 RX ORDER — SODIUM CHLORIDE 9 MG/ML
INJECTION, SOLUTION INTRAVENOUS CONTINUOUS
Status: DISCONTINUED | OUTPATIENT
Start: 2024-06-22 | End: 2024-06-26 | Stop reason: HOSPADM

## 2024-06-22 RX ORDER — AMOXICILLIN 250 MG
1 CAPSULE ORAL 2 TIMES DAILY PRN
Status: DISCONTINUED | OUTPATIENT
Start: 2024-06-22 | End: 2024-06-26 | Stop reason: HOSPADM

## 2024-06-22 RX ORDER — IPRATROPIUM BROMIDE AND ALBUTEROL SULFATE 2.5; .5 MG/3ML; MG/3ML
3 SOLUTION RESPIRATORY (INHALATION) EVERY 6 HOURS PRN
Status: DISCONTINUED | OUTPATIENT
Start: 2024-06-22 | End: 2024-06-26 | Stop reason: HOSPADM

## 2024-06-22 RX ORDER — IBUPROFEN 200 MG
16 TABLET ORAL
Status: DISCONTINUED | OUTPATIENT
Start: 2024-06-22 | End: 2024-06-26 | Stop reason: HOSPADM

## 2024-06-22 RX ORDER — ONDANSETRON HYDROCHLORIDE 2 MG/ML
4 INJECTION, SOLUTION INTRAVENOUS EVERY 8 HOURS PRN
Status: DISCONTINUED | OUTPATIENT
Start: 2024-06-22 | End: 2024-06-26 | Stop reason: HOSPADM

## 2024-06-22 RX ORDER — NALOXONE HCL 0.4 MG/ML
0.02 VIAL (ML) INJECTION
Status: DISCONTINUED | OUTPATIENT
Start: 2024-06-22 | End: 2024-06-26 | Stop reason: HOSPADM

## 2024-06-22 RX ORDER — IBUPROFEN 200 MG
24 TABLET ORAL
Status: DISCONTINUED | OUTPATIENT
Start: 2024-06-22 | End: 2024-06-26 | Stop reason: HOSPADM

## 2024-06-22 RX ADMIN — DOCUSATE SODIUM AND SENNOSIDES 1 TABLET: 8.6; 5 TABLET, FILM COATED ORAL at 10:06

## 2024-06-22 RX ADMIN — SODIUM CHLORIDE 100 ML/HR: 9 INJECTION, SOLUTION INTRAVENOUS at 10:06

## 2024-06-22 RX ADMIN — SODIUM CHLORIDE 1000 ML: 9 INJECTION, SOLUTION INTRAVENOUS at 08:06

## 2024-06-22 NOTE — PROGRESS NOTES
Pt with critical calcium of 12.1, she is feeling tired . Advised to come to [facility] ER for treatment and evaluation .     Dr Otero

## 2024-06-23 PROBLEM — I10 HYPERTENSION: Status: ACTIVE | Noted: 2024-06-23

## 2024-06-23 PROBLEM — E66.09 CLASS 1 OBESITY DUE TO EXCESS CALORIES WITH BODY MASS INDEX (BMI) OF 30.0 TO 30.9 IN ADULT: Status: ACTIVE | Noted: 2024-06-23

## 2024-06-23 PROBLEM — E66.811 CLASS 1 OBESITY DUE TO EXCESS CALORIES WITH BODY MASS INDEX (BMI) OF 30.0 TO 30.9 IN ADULT: Status: ACTIVE | Noted: 2024-06-23

## 2024-06-23 PROBLEM — N17.9 AKI (ACUTE KIDNEY INJURY): Status: ACTIVE | Noted: 2024-06-23

## 2024-06-23 LAB
ALBUMIN SERPL BCP-MCNC: 2.7 G/DL (ref 3.5–5.2)
ALP SERPL-CCNC: 92 U/L (ref 55–135)
ALT SERPL W/O P-5'-P-CCNC: 16 U/L (ref 10–44)
ANION GAP SERPL CALC-SCNC: 10 MMOL/L (ref 8–16)
AST SERPL-CCNC: 22 U/L (ref 10–40)
BASOPHILS # BLD AUTO: 0.04 K/UL (ref 0–0.2)
BASOPHILS NFR BLD: 0.8 % (ref 0–1.9)
BILIRUB SERPL-MCNC: 0.6 MG/DL (ref 0.1–1)
BUN SERPL-MCNC: 17 MG/DL (ref 6–20)
CALCIUM SERPL-MCNC: 11.1 MG/DL (ref 8.7–10.5)
CALCIUM UR-MCNC: 21.9 MG/DL (ref 0–15)
CHLORIDE SERPL-SCNC: 104 MMOL/L (ref 95–110)
CO2 SERPL-SCNC: 23 MMOL/L (ref 23–29)
CREAT SERPL-MCNC: 1.5 MG/DL (ref 0.5–1.4)
CREAT UR-MCNC: 41.9 MG/DL (ref 15–325)
DIFFERENTIAL METHOD BLD: ABNORMAL
EOSINOPHIL # BLD AUTO: 0.4 K/UL (ref 0–0.5)
EOSINOPHIL NFR BLD: 7.4 % (ref 0–8)
ERYTHROCYTE [DISTWIDTH] IN BLOOD BY AUTOMATED COUNT: 13.6 % (ref 11.5–14.5)
EST. GFR  (NO RACE VARIABLE): 41 ML/MIN/1.73 M^2
GLUCOSE SERPL-MCNC: 80 MG/DL (ref 70–110)
HCT VFR BLD AUTO: 40.1 % (ref 37–48.5)
HGB BLD-MCNC: 12.9 G/DL (ref 12–16)
IMM GRANULOCYTES # BLD AUTO: 0.02 K/UL (ref 0–0.04)
IMM GRANULOCYTES NFR BLD AUTO: 0.4 % (ref 0–0.5)
LYMPHOCYTES # BLD AUTO: 1.2 K/UL (ref 1–4.8)
LYMPHOCYTES NFR BLD: 22.7 % (ref 18–48)
MCH RBC QN AUTO: 28.8 PG (ref 27–31)
MCHC RBC AUTO-ENTMCNC: 32.2 G/DL (ref 32–36)
MCV RBC AUTO: 90 FL (ref 82–98)
MONOCYTES # BLD AUTO: 0.8 K/UL (ref 0.3–1)
MONOCYTES NFR BLD: 15.1 % (ref 4–15)
NEUTROPHILS # BLD AUTO: 2.8 K/UL (ref 1.8–7.7)
NEUTROPHILS NFR BLD: 53.6 % (ref 38–73)
NRBC BLD-RTO: 0 /100 WBC
OHS QRS DURATION: 70 MS
OHS QTC CALCULATION: 636 MS
PLATELET # BLD AUTO: 333 K/UL (ref 150–450)
PMV BLD AUTO: 10.6 FL (ref 9.2–12.9)
POCT GLUCOSE: 100 MG/DL (ref 70–110)
POCT GLUCOSE: 140 MG/DL (ref 70–110)
POCT GLUCOSE: 81 MG/DL (ref 70–110)
POCT GLUCOSE: 97 MG/DL (ref 70–110)
POTASSIUM SERPL-SCNC: 3.7 MMOL/L (ref 3.5–5.1)
PROT SERPL-MCNC: 10.5 G/DL (ref 6–8.4)
PTH-INTACT SERPL-MCNC: 9.8 PG/ML (ref 9–77)
RBC # BLD AUTO: 4.48 M/UL (ref 4–5.4)
SODIUM SERPL-SCNC: 137 MMOL/L (ref 136–145)
WBC # BLD AUTO: 5.29 K/UL (ref 3.9–12.7)

## 2024-06-23 PROCEDURE — 82570 ASSAY OF URINE CREATININE: CPT | Performed by: INTERNAL MEDICINE

## 2024-06-23 PROCEDURE — 83521 IG LIGHT CHAINS FREE EACH: CPT | Mod: 59 | Performed by: INTERNAL MEDICINE

## 2024-06-23 PROCEDURE — 25000003 PHARM REV CODE 250: Performed by: HOSPITALIST

## 2024-06-23 PROCEDURE — 84165 PROTEIN E-PHORESIS SERUM: CPT | Performed by: INTERNAL MEDICINE

## 2024-06-23 PROCEDURE — 82340 ASSAY OF CALCIUM IN URINE: CPT | Performed by: INTERNAL MEDICINE

## 2024-06-23 PROCEDURE — 63600175 PHARM REV CODE 636 W HCPCS: Performed by: INTERNAL MEDICINE

## 2024-06-23 PROCEDURE — 96361 HYDRATE IV INFUSION ADD-ON: CPT

## 2024-06-23 PROCEDURE — 99223 1ST HOSP IP/OBS HIGH 75: CPT | Mod: ,,, | Performed by: INTERNAL MEDICINE

## 2024-06-23 PROCEDURE — 85025 COMPLETE CBC W/AUTO DIFF WBC: CPT | Performed by: HOSPITALIST

## 2024-06-23 PROCEDURE — 86334 IMMUNOFIX E-PHORESIS SERUM: CPT | Mod: 26,,, | Performed by: PATHOLOGY

## 2024-06-23 PROCEDURE — 86334 IMMUNOFIX E-PHORESIS SERUM: CPT | Performed by: INTERNAL MEDICINE

## 2024-06-23 PROCEDURE — 25000003 PHARM REV CODE 250: Performed by: INTERNAL MEDICINE

## 2024-06-23 PROCEDURE — 84165 PROTEIN E-PHORESIS SERUM: CPT | Mod: 26,,, | Performed by: PATHOLOGY

## 2024-06-23 PROCEDURE — 25000003 PHARM REV CODE 250: Performed by: FAMILY MEDICINE

## 2024-06-23 PROCEDURE — 21400001 HC TELEMETRY ROOM

## 2024-06-23 PROCEDURE — 80053 COMPREHEN METABOLIC PANEL: CPT | Performed by: HOSPITALIST

## 2024-06-23 PROCEDURE — 36415 COLL VENOUS BLD VENIPUNCTURE: CPT | Performed by: HOSPITALIST

## 2024-06-23 PROCEDURE — 82397 CHEMILUMINESCENT ASSAY: CPT | Performed by: INTERNAL MEDICINE

## 2024-06-23 PROCEDURE — 83970 ASSAY OF PARATHORMONE: CPT | Performed by: INTERNAL MEDICINE

## 2024-06-23 RX ORDER — GLYCERIN 1 G/1
1 SUPPOSITORY RECTAL ONCE
Status: COMPLETED | OUTPATIENT
Start: 2024-06-23 | End: 2024-06-23

## 2024-06-23 RX ORDER — AMOXICILLIN 250 MG
1 CAPSULE ORAL 2 TIMES DAILY
Status: DISCONTINUED | OUTPATIENT
Start: 2024-06-23 | End: 2024-06-26 | Stop reason: HOSPADM

## 2024-06-23 RX ORDER — HYDRALAZINE HYDROCHLORIDE 20 MG/ML
10 INJECTION INTRAMUSCULAR; INTRAVENOUS EVERY 6 HOURS PRN
Status: DISCONTINUED | OUTPATIENT
Start: 2024-06-23 | End: 2024-06-26 | Stop reason: HOSPADM

## 2024-06-23 RX ORDER — POLYETHYLENE GLYCOL 3350 17 G/17G
17 POWDER, FOR SOLUTION ORAL DAILY
Status: DISCONTINUED | OUTPATIENT
Start: 2024-06-23 | End: 2024-06-26 | Stop reason: HOSPADM

## 2024-06-23 RX ADMIN — POLYETHYLENE GLYCOL 3350 17 G: 17 POWDER, FOR SOLUTION ORAL at 01:06

## 2024-06-23 RX ADMIN — AMLODIPINE BESYLATE 10 MG: 10 TABLET ORAL at 09:06

## 2024-06-23 RX ADMIN — DOCUSATE SODIUM AND SENNOSIDES 1 TABLET: 8.6; 5 TABLET, FILM COATED ORAL at 09:06

## 2024-06-23 RX ADMIN — GLYCERIN 1 SUPPOSITORY: 2 SUPPOSITORY RECTAL at 04:06

## 2024-06-23 RX ADMIN — PAMIDRONATE DISODIUM 60 MG: 9 INJECTION INTRAVENOUS at 01:06

## 2024-06-23 RX ADMIN — DOCUSATE SODIUM AND SENNOSIDES 1 TABLET: 8.6; 5 TABLET, FILM COATED ORAL at 08:06

## 2024-06-23 NOTE — ASSESSMENT & PLAN NOTE
"The patient has hypercalcemia that is currently uncontrolled. The patient has the following symptoms due to their hypercalcemia: weakness, fatigue, and constipation. The hypercalcemia is likely due to  unknown etiology currently but is noted to be on HCTZ as well as Vit D outpatient . We will obtain the following labs to work up the hypercalcemia:  currently underway . We will treat the hypercalcemia with: IV fluids ordered at a rate of 100 ml/hr.  Patient also started on stool softeners.  Home Hyzaar and vitamin-D currently on hold.  Patient needing PTH and vitamin-D level.  Their latest calcium has been reviewed and is listed below.  No results found for: "CAION"  Minimal improvement with fluids  Increase rate of fluids  Nephrology consulted   "

## 2024-06-23 NOTE — SUBJECTIVE & OBJECTIVE
Interval History: See hospital course for today      Review of Systems   Constitutional:  Positive for activity change, appetite change and fatigue.   HENT:  Negative for trouble swallowing.    Respiratory:  Negative for shortness of breath.    Cardiovascular:  Negative for chest pain and leg swelling.   Gastrointestinal:  Positive for constipation, nausea and vomiting. Negative for abdominal pain.   Genitourinary:  Positive for frequency.   Neurological:  Positive for weakness.   Psychiatric/Behavioral:  Positive for confusion. Negative for agitation, behavioral problems and dysphoric mood. The patient is not nervous/anxious.      Objective:     Vital Signs (Most Recent):  Temp: 98.3 °F (36.8 °C) (06/23/24 0747)  Pulse: 67 (06/23/24 0800)  Resp: 18 (06/23/24 0747)  BP: 129/72 (06/23/24 0747)  SpO2: 95 % (06/23/24 0747) Vital Signs (24h Range):  Temp:  [97.7 °F (36.5 °C)-98.4 °F (36.9 °C)] 98.3 °F (36.8 °C)  Pulse:  [] 67  Resp:  [16-25] 18  SpO2:  [94 %-100 %] 95 %  BP: (122-180)/(69-97) 129/72     Weight: 77.5 kg (170 lb 13.7 oz)  Body mass index is 30.27 kg/m².  No intake or output data in the 24 hours ending 06/23/24 0941      Physical Exam  Vitals and nursing note reviewed.   Constitutional:       General: She is not in acute distress.     Appearance: She is ill-appearing. She is not toxic-appearing.   HENT:      Head: Normocephalic and atraumatic.   Cardiovascular:      Rate and Rhythm: Normal rate.   Pulmonary:      Effort: Pulmonary effort is normal. No respiratory distress.   Abdominal:      Palpations: Abdomen is soft.      Tenderness: There is no abdominal tenderness.      Comments: Dullness to percussion in all quadrants   Musculoskeletal:      Right lower leg: No edema.      Left lower leg: No edema.   Skin:     General: Skin is warm.   Neurological:      Mental Status: She is alert and oriented to person, place, and time.      Motor: Weakness present.   Psychiatric:         Mood and Affect: Mood  normal.         Speech: Speech normal.         Behavior: Behavior normal.             Significant Labs: All pertinent labs within the past 24 hours have been reviewed.  CBC:   Recent Labs   Lab 06/22/24  0948 06/23/24  0434   WBC 5.75 5.29   HGB 12.8 12.9   HCT 41.7 40.1    333     CMP:   Recent Labs   Lab 06/22/24  0948 06/23/24  0434   * 137   K 4.4 3.7    104   CO2 25 23    80   BUN 21* 17   CREATININE 1.6* 1.5*   CALCIUM 12.1* 11.1*   PROT  --  10.5*   ALBUMIN 2.8* 2.7*   BILITOT  --  0.6   ALKPHOS  --  92   AST  --  22   ALT  --  16   ANIONGAP 10 10     POCT Glucose:   Recent Labs   Lab 06/22/24  2140 06/23/24  0042 06/23/24  0604   POCTGLUCOSE 111* 140* 81     Urine Studies:   Recent Labs   Lab 06/22/24  1918   COLORU Yellow   APPEARANCEUA Hazy*   PHUR 7.0   SPECGRAV 1.010   PROTEINUA 1+*   GLUCUA Negative   KETONESU Negative   BILIRUBINUA Negative   OCCULTUA 2+*   NITRITE Negative   UROBILINOGEN Negative   LEUKOCYTESUR Negative   RBCUA 26*   WBCUA 8*   BACTERIA None   SQUAMEPITHEL 11   HYALINECASTS 4*       Significant Imaging: I have reviewed all pertinent imaging results/findings within the past 24 hours.

## 2024-06-23 NOTE — ASSESSMENT & PLAN NOTE
"The patient has hypercalcemia that is currently uncontrolled. The patient has the following symptoms due to their hypercalcemia: weakness, fatigue, and constipation. The hypercalcemia is likely due to  unknown etiology currently but is noted to be on HCTZ as well as Vit D outpatient . We will obtain the following labs to work up the hypercalcemia:  currently underway . We will treat the hypercalcemia with: IV fluids ordered at a rate of 100 ml/hr.  Patient also started on stool softeners.  Home Hyzaar and vitamin-D currently on hold.  Patient needing PTH and vitamin-D level.  Their latest calcium has been reviewed and is listed below.  No results found for: "ISACC"    "

## 2024-06-23 NOTE — ASSESSMENT & PLAN NOTE
Body mass index is 30.27 kg/m². Morbid obesity complicates all aspects of disease management from diagnostic modalities to treatment. Weight loss encouraged and health benefits explained to patient.

## 2024-06-23 NOTE — ASSESSMENT & PLAN NOTE
Patient currently on outpatient supplement replacement therapy currently on hold due to hypercalcemia as noted above.  Plan:  -hold home medications  -f/u with PCP/Nephrology to resume following hospital discharge

## 2024-06-23 NOTE — ASSESSMENT & PLAN NOTE
Chronic, uncontrolled. Latest blood pressure and vitals reviewed-     Temp:  [97.7 °F (36.5 °C)-98.4 °F (36.9 °C)]   Pulse:  []   Resp:  [16-25]   BP: (122-180)/(69-97)   SpO2:  [94 %-100 %] .   Home meds for hypertension were reviewed and noted below.   Hypertension Medications               amLODIPine (NORVASC) 5 MG tablet Take 10 mg by mouth.    losartan-hydrochlorothiazide 50-12.5 mg (HYZAAR) 50-12.5 mg per tablet Take 1 tablet by mouth once daily.     While in the hospital, will manage blood pressure as follows; Adjust home antihypertensive regimen as follows- continue amlodipine and hold Hyzaar given SHENA and hypercalcemia    Will utilize p.r.n. blood pressure medication only if patient's blood pressure greater than 160/100 and she develops symptoms such as worsening chest pain or shortness of breath.

## 2024-06-23 NOTE — HPI
Rosibel Herrera is a 54 y.o. female with a PMH  has a past medical history of Calculus of kidney (4/9/2014 2:20:53 PM), Complications affecting other specified body systems, hypertension (3/26/2014 1:38:44 PM), Excessive or frequent menstruation (3/20/2014 9:22:15 AM), Hypertension, and Leiomyoma of uterus (3/20/2014 9:23:21 AM). who presented to the ED directed by primary nephrologist for abnormal lab findings consist of hypercalcemia.  Patient presented with persistent complaints of fatigue, nausea, and constipation x2 weeks duration and was found to have hypercalcemia measuring 12.1 with corrected calcium measuring 13.1.  Patient denied endorsing any other symptoms and reported being in her usual state of health with no other concerns or complaints.  Nephrology was consulted by ED staff to obtain further recommendations and recommended patient be initiated on normal saline at 100 cc/hour with repeat labs in the morning.  Patient admitted to Hospital Medicine under observation for continued medical management.    PCP: Briana Vu

## 2024-06-23 NOTE — ASSESSMENT & PLAN NOTE
Patient with acute kidney injury/acute renal failure vs CKD. Etiology currently unknown but may be due to  progressive disease versus adverse side effect from medications.  SHENA is currently  undergoing medical management . Baseline creatinine  1.2-1.5  - Labs reviewed- Renal function/electrolytes with Estimated Creatinine Clearance: 38.7 mL/min (A) (based on SCr of 1.6 mg/dL (H)). according to latest data. Monitor urine output and serial BMP and adjust therapy as needed. Avoid nephrotoxins and renally dose meds for GFR listed above.  Patient currently follows Nephrology outpatient.

## 2024-06-23 NOTE — ASSESSMENT & PLAN NOTE
Patient is not currently on statin.will recommend patient begin statin at time of hospital discharge. Last Lipid Panel:   Lab Results   Component Value Date    CHOL 204 (H) 06/03/2024    HDL 33 (L) 06/03/2024    LDLCALC 133.4 06/03/2024    TRIG 188 (H) 06/03/2024    CHOLHDL 16.2 (L) 06/03/2024   Plan:  -low fat/low calorie diet

## 2024-06-23 NOTE — CONSULTS
Nephrology Consultation  Consulting Physician:  Dr Granger  Reason for consultation:  Hypercalcemia, SHENA   Informants: pt, staff      History of Present Illness     Rosibel Herrera  is a 54 y.o. female with a hx of hypertension, remote history of kidney stones, metabolic syndrome, patient was admitted hospital yesterday for further evaluation of persistent hypercalcemia.  She had lab work on 06/03/2024 that showed a serum calcium of 12.1, creatinine 1.5 mg/dL.  She had an appointment with outpatient Nephrology for further evaluation, repeat labs yesterday showed a creatinine up again 12.1, patient was advised to come to the hospital for further evaluation and management.  She was started on IV fluids yesterday with slight improvement in serum creatinine to 11 today.  Patient takes vitamin-D supplements but denied recent significant calcium intake.      PMHx:    Past Medical History:   Diagnosis Date    Calculus of kidney 4/9/2014 2:20:53 PM    [facility] - Urology: KidneyStones-No Additional Notes    Complications affecting other specified body systems, hypertension 3/26/2014 1:38:44 PM    [facility] - Cardiovascular: Hypertension-No Additional Notes    Excessive or frequent menstruation 3/20/2014 9:22:15 AM    [facility] - [facility]: Bleeding, Menorrhagia-No Additional Notes    Hypertension     Leiomyoma of uterus 3/20/2014 9:23:21 AM    [facility] - [facility]: Fibroids/Leiomyoma of Uterus-2.1x1.8cm (POST/LT); 3.9x3.5cm (ANT TO ENDO)         PSHx:  Past Surgical History:   Procedure Laterality Date    ENDOMETRIAL ABLATION      HYSTERECTOMY      fibroids    LITHOTRIPSY           SocHx:    Social History     Socioeconomic History    Marital status:    Tobacco Use    Smoking status: Never    Smokeless tobacco: Never   Substance and Sexual Activity    Alcohol use: Yes     Comment: rarely    Drug use: No    Sexual activity: Yes     Partners: Male     Birth  control/protection: Post-menopausal     Social Determinants of Health     Financial Resource Strain: Low Risk  (5/30/2024)    Overall Financial Resource Strain (CARDIA)     Difficulty of Paying Living Expenses: Not hard at all   Food Insecurity: No Food Insecurity (5/30/2024)    Hunger Vital Sign     Worried About Running Out of Food in the Last Year: Never true     Ran Out of Food in the Last Year: Never true   Physical Activity: Insufficiently Active (5/30/2024)    Exercise Vital Sign     Days of Exercise per Week: 2 days     Minutes of Exercise per Session: 30 min   Stress: No Stress Concern Present (5/30/2024)    [employer] - Occupational Stress Questionnaire     Feeling of Stress : Only a little   Housing Stability: Unknown (5/30/2024)    Housing Stability Vital Sign     Unable to Pay for Housing in the Last Year: No         FamHx:    Family History   Problem Relation Name Age of Onset    Diabetes Father           ROS:    Review of Systems   Constitutional:  Positive for malaise/fatigue. Negative for chills, fever and weight loss.   HENT:  Negative for ear pain, hearing loss and tinnitus.    Eyes:  Negative for blurred vision and double vision.   Respiratory:  Negative for cough and hemoptysis.    Cardiovascular:  Negative for chest pain, palpitations and orthopnea.   Gastrointestinal:  Negative for heartburn, nausea and vomiting.   Genitourinary:  Negative for dysuria, frequency and urgency.   Musculoskeletal:  Negative for myalgias.   Neurological:  Negative for dizziness and headaches.   Psychiatric/Behavioral:  Negative for depression and suicidal ideas.         Allergies:    is allergic to tylox [oxycodone-acetaminophen].    Current medications:   Scheduled Meds:   amLODIPine  10 mg Oral Daily    enoxparin  40 mg Subcutaneous Daily    glycerin adult  1 suppository Rectal Once    pamidronate (AREDIA) 60 mg in 0.9% NaCl 250 mL infusion  60 mg Intravenous Once    polyethylene  "glycol  17 g Oral Daily    senna-docusate 8.6-50 mg  1 tablet Oral BID     Continuous Infusions:   0.9% NaCl   Intravenous Continuous 125 mL/hr at 06/23/24 0920 Rate Change at 06/23/24 0920     PRN Meds:.  Current Facility-Administered Medications:     acetaminophen, 650 mg, Rectal, Q6H PRN    acetaminophen, 650 mg, Oral, Q8H PRN    albuterol-ipratropium, 3 mL, Nebulization, Q6H PRN    aluminum-magnesium hydroxide-simethicone, 30 mL, Oral, QID PRN    dextrose 10%, 12.5 g, Intravenous, PRN    dextrose 10%, 25 g, Intravenous, PRN    glucagon (human recombinant), 1 mg, Intramuscular, PRN    glucose, 16 g, Oral, PRN    glucose, 24 g, Oral, PRN    hydrALAZINE, 10 mg, Intravenous, Q6H PRN    insulin aspart U-100, 0-5 Units, Subcutaneous, QID (AC + HS) PRN    melatonin, 6 mg, Oral, Nightly PRN    naloxone, 0.02 mg, Intravenous, PRN    ondansetron, 4 mg, Intravenous, Q8H PRN    promethazine, 25 mg, Oral, Q6H PRN    senna-docusate 8.6-50 mg, 1 tablet, Oral, BID PRN    sodium chloride 0.9%, 10 mL, Intravenous, Q12H PRN       Physical Examination    VS/Measurements   /72   Pulse 67   Temp 98.3 °F (36.8 °C) (Oral)   Resp 18   Ht 5' 3" (1.6 m)   Wt 77.5 kg (170 lb 13.7 oz)   SpO2 95%   BMI 30.27 kg/m²     Physical Exam  Constitutional:       General: She is not in acute distress.     Appearance: She is not toxic-appearing.   HENT:      Head: Normocephalic and atraumatic.      Nose: No congestion or rhinorrhea.   Eyes:      Extraocular Movements: Extraocular movements intact.      Conjunctiva/sclera: Conjunctivae normal.   Cardiovascular:      Rate and Rhythm: Normal rate and regular rhythm.      Heart sounds: No murmur heard.     No friction rub.   Pulmonary:      Effort: Pulmonary effort is normal. No respiratory distress.      Breath sounds: Normal breath sounds. No stridor.   Abdominal:      General: Abdomen is flat. Bowel sounds are normal. There is no distension.      Palpations: Abdomen is soft. There is no " mass.   Musculoskeletal:         General: No swelling or tenderness.      Cervical back: Normal range of motion. No rigidity or tenderness.   Skin:     Coloration: Skin is not jaundiced or pale.   Neurological:      Mental Status: She is alert.      Cranial Nerves: No cranial nerve deficit.              Laboratory Results   Today's Lab Results :    Recent Results (from the past 24 hour(s))   EKG 12-lead    Collection Time: 06/22/24  7:13 PM   Result Value Ref Range    QRS Duration 70 ms    OHS QTC Calculation 636 ms   Urinalysis, Reflex to Urine Culture Urine, Clean Catch    Collection Time: 06/22/24  7:18 PM    Specimen: Urine   Result Value Ref Range    Specimen UA Urine, Clean Catch     Color, UA Yellow Yellow, Straw, Autumn    Appearance, UA Hazy (A) Clear    pH, UA 7.0 5.0 - 8.0    Specific Gravity, UA 1.010 1.005 - 1.030    Protein, UA 1+ (A) Negative    Glucose, UA Negative Negative    Ketones, UA Negative Negative    Bilirubin (UA) Negative Negative    Occult Blood UA 2+ (A) Negative    Nitrite, UA Negative Negative    Urobilinogen, UA Negative <2.0 EU/dL    Leukocytes, UA Negative Negative   Urinalysis Microscopic    Collection Time: 06/22/24  7:18 PM   Result Value Ref Range    RBC, UA 26 (H) 0 - 4 /hpf    WBC, UA 8 (H) 0 - 5 /hpf    Bacteria None None-Occ /hpf    Squam Epithel, UA 11 /hpf    Hyaline Casts, UA 4 (A) 0-1/lpf /lpf    Microscopic Comment SEE COMMENT    POCT glucose    Collection Time: 06/22/24  9:40 PM   Result Value Ref Range    POCT Glucose 111 (H) 70 - 110 mg/dL   POCT glucose    Collection Time: 06/23/24 12:42 AM   Result Value Ref Range    POCT Glucose 140 (H) 70 - 110 mg/dL   Comprehensive Metabolic Panel (CMP)    Collection Time: 06/23/24  4:34 AM   Result Value Ref Range    Sodium 137 136 - 145 mmol/L    Potassium 3.7 3.5 - 5.1 mmol/L    Chloride 104 95 - 110 mmol/L    CO2 23 23 - 29 mmol/L    Glucose 80 70 - 110 mg/dL    BUN 17 6 - 20 mg/dL    Creatinine 1.5 (H) 0.5 - 1.4 mg/dL     Calcium 11.1 (H) 8.7 - 10.5 mg/dL    Total Protein 10.5 (H) 6.0 - 8.4 g/dL    Albumin 2.7 (L) 3.5 - 5.2 g/dL    Total Bilirubin 0.6 0.1 - 1.0 mg/dL    Alkaline Phosphatase 92 55 - 135 U/L    AST 22 10 - 40 U/L    ALT 16 10 - 44 U/L    eGFR 41 (A) >60 mL/min/1.73 m^2    Anion Gap 10 8 - 16 mmol/L   CBC with Automated Differential    Collection Time: 06/23/24  4:34 AM   Result Value Ref Range    WBC 5.29 3.90 - 12.70 K/uL    RBC 4.48 4.00 - 5.40 M/uL    Hemoglobin 12.9 12.0 - 16.0 g/dL    Hematocrit 40.1 37.0 - 48.5 %    MCV 90 82 - 98 fL    MCH 28.8 27.0 - 31.0 pg    MCHC 32.2 32.0 - 36.0 g/dL    RDW 13.6 11.5 - 14.5 %    Platelets 333 150 - 450 K/uL    MPV 10.6 9.2 - 12.9 fL    Immature Granulocytes 0.4 0.0 - 0.5 %    Gran # (ANC) 2.8 1.8 - 7.7 K/uL    Immature Grans (Abs) 0.02 0.00 - 0.04 K/uL    Lymph # 1.2 1.0 - 4.8 K/uL    Mono # 0.8 0.3 - 1.0 K/uL    Eos # 0.4 0.0 - 0.5 K/uL    Baso # 0.04 0.00 - 0.20 K/uL    nRBC 0 0 /100 WBC    Gran % 53.6 38.0 - 73.0 %    Lymph % 22.7 18.0 - 48.0 %    Mono % 15.1 (H) 4.0 - 15.0 %    Eosinophil % 7.4 0.0 - 8.0 %    Basophil % 0.8 0.0 - 1.9 %    Differential Method Automated    POCT glucose    Collection Time: 06/23/24  6:04 AM   Result Value Ref Range    POCT Glucose 81 70 - 110 mg/dL   POCT glucose    Collection Time: 06/23/24 11:07 AM   Result Value Ref Range    POCT Glucose 97 70 - 110 mg/dL        Lab Results   Component Value Date    HGBA1C 6.2 (H) 06/03/2024     Lab Results   Component Value Date    CALCIUM 11.1 (H) 06/23/2024    PHOS 3.7 06/22/2024       Assessment and Plan     Hypercalcemia  - etiology unclear but differential diagnosis includes primary hyperparathyroidism, FHH,   - check PTH, PTH RP, urine fractional excretion of calcium  - check renal ultrasound to evaluate for kidney stones with remote history  - need to rule out paraproteinemia.  Ordered SPEP,  and serum light chain assay  - further workup depends upon initial lab results.  - continue IV fluids.   We will also give her a dose of pamidronate 60 mg today.    SHENA / SHENA on CKD 3B  -  serum creatinine has been stable around 1.5 mg/dL.  Likely her baseline creatinine.  Monitor renal function closely.  Avoid NSAID use.    HTN  - continue amlodipine.  Agree with holding losartan and HCTZ.  - avoid Thiazide use in future due to hypercalcemia.  This medication needs to be discontinued at discharge.    Anemia of CKD.  Hemoglobin stable at 13 g.  Monitor.    Proteinuria.  Mild at 0.6 g per day.  Resume ARB  at the time of discharge if renal function stable.    Plan was discussed with patient and primary team.      ________________________________________________  Oneil Otero

## 2024-06-23 NOTE — ASSESSMENT & PLAN NOTE
Patient with acute kidney injury/acute renal failure vs CKD. Etiology currently unknown but may be due to  progressive disease versus adverse side effect from medications.  SHENA is currently  undergoing medical management . Baseline creatinine  1.2-1.5  - Labs reviewed- Renal function/electrolytes with Estimated Creatinine Clearance: 41.3 mL/min (A) (based on SCr of 1.5 mg/dL (H)). according to latest data. Monitor urine output and serial BMP and adjust therapy as needed. Avoid nephrotoxins and renally dose meds for GFR listed above.  Patient currently follows Nephrology outpatient.  Nephrology consulted

## 2024-06-23 NOTE — ASSESSMENT & PLAN NOTE
Patient's FSGs are controlled on current medication regimen.  Last A1c reviewed-   Lab Results   Component Value Date    HGBA1C 6.2 (H) 06/03/2024     Most recent fingerstick glucose reviewed-   Recent Labs   Lab 06/22/24 2140 06/23/24  0042   POCTGLUCOSE 111* 140*     Current correctional scale  Low  Titrate as needed  anti-hyperglycemic dose as follows-   Antihyperglycemics (From admission, onward)      Start     Stop Route Frequency Ordered    06/22/24 2232  insulin aspart U-100 pen 0-5 Units         -- SubQ Before meals & nightly PRN 06/22/24 2132        Plan:  -SSI  -A1c  -Accu-checks  -Hold oral hypoglycemics while patient is in the hospital  -Hypoglycemic protocol

## 2024-06-23 NOTE — ED PROVIDER NOTES
SCRIBE #1 NOTE: I, Michelle Harrington, am scribing for, and in the presence of, Sergio Rosales Jr., MD. I have scribed the entire note.       History     Chief Complaint   Patient presents with    Fatigue     Pt went this morning to Nephrology for scheduled labs. Critical calcium level and was told to come to the er. Pt complaining of extreme fatigue and constipation x2 weeks. +N/-V/-D. Hx of diabetes and hypertension.      Review of patient's allergies indicates:   Allergen Reactions    Tylox [oxycodone-acetaminophen]          History of Present Illness     HPI    6/22/2024, 8:24 PM  History obtained from the patient      History of Present Illness: Rosibel Herrera is a 54 y.o. female patient with a PMHx of HTN and DM who presents to the Emergency Department for evaluation of fatigue which onset 2 weeks ago. Pt visited nephrology for labs that resulted of high calcium levels. Pt was recommended to visit ER per Dr. Alexis. Symptoms are constant and moderate in severity. No mitigating or exacerbating factors reported. Associated sxs include nausea and constipation. Patient denies any vomiting, diarrhea, and all other sxs at this time. No prior Tx provided. No further complaints or concerns at this time.       Arrival mode: Personal vehicle  PCP: Briana Vu DO        Past Medical History:  Past Medical History:   Diagnosis Date    Calculus of kidney 4/9/2014 2:20:53 PM    [facility] Historical - Urology: KidneyStones-No Additional Notes    Complications affecting other specified body systems, hypertension 3/26/2014 1:38:44 PM    [facility] Historical - Cardiovascular: Hypertension-No Additional Notes    Excessive or frequent menstruation 3/20/2014 9:22:15 AM    [facility] - [facility]: Bleeding, Menorrhagia-No Additional Notes    Hypertension     Leiomyoma of uterus 3/20/2014 9:23:21 AM    [facility] Historical - [facility]: Fibroids/Leiomyoma of Uterus-2.1x1.8cm (POST/LT); 3.9x3.5cm (ANT TO ENDO)        Past Surgical History:  Past Surgical History:   Procedure Laterality Date    ENDOMETRIAL ABLATION      HYSTERECTOMY      fibroids    LITHOTRIPSY           Family History:  Family History   Problem Relation Name Age of Onset    Diabetes Father         Social History:  Social History     Tobacco Use    Smoking status: Never    Smokeless tobacco: Never   Substance and Sexual Activity    Alcohol use: Yes     Comment: rarely    Drug use: No    Sexual activity: Yes     Partners: Male     Birth control/protection: Post-menopausal        Review of Systems     Review of Systems   Constitutional:  Positive for fatigue. Negative for fever.   HENT:  Negative for sore throat.    Respiratory:  Negative for shortness of breath.    Cardiovascular:  Negative for chest pain.   Gastrointestinal:  Positive for constipation and nausea. Negative for diarrhea and vomiting.   Genitourinary:  Negative for dysuria.   Musculoskeletal:  Negative for back pain.   Skin:  Negative for rash.   Neurological:  Negative for weakness.   Hematological:  Does not bruise/bleed easily.   All other systems reviewed and are negative.     Physical Exam     Initial Vitals [06/22/24 1911]   BP Pulse Resp Temp SpO2   (!) 180/97 (!) 120 18 98.4 °F (36.9 °C) 98 %      MAP       --          Physical Exam  Nursing Notes and Vital Signs Reviewed.  Constitutional: Patient is in no acute distress. Well-developed and well-nourished.  Head: Atraumatic. Normocephalic.  Eyes:  EOM intact.  No scleral icterus.  ENT: Mucous membranes are moist.  Nares clear   Neck:  Full ROM. No JVD.  Cardiovascular: Regular rate. Regular rhythm No murmurs, rubs, or gallops. Distal pulses are 2+ and symmetric  Pulmonary/Chest: No respiratory distress. Clear to auscultation bilaterally. No wheezing or rales.  Equal chest wall rise bilaterally  Abdominal: Soft and non-distended.  There is no tenderness.  No rebound, guarding, or rigidity. Good bowel sounds.  Genitourinary: No CVA  "tenderness.  No suprapubic tenderness  Musculoskeletal: Moves all extremities. No obvious deformities.  5 x 5 strength in all extremities   Skin: Warm and dry.  Neurological:  Alert, awake, and appropriate.  Normal speech.  No acute focal neurological deficits are appreciated.  Two through 12 intact bilaterally.  Psychiatric: Normal affect. Good eye contact. Appropriate in content.     ED Course   Critical Care    Date/Time: 6/22/2024 8:36 PM    Performed by: Sergio Rosales Jr., MD  Authorized by: Sergio Rosales Jr., MD  Direct patient critical care time: 20 minutes  Additional history critical care time: 10 minutes  Ordering / reviewing critical care time: 5 minutes  Documentation critical care time: 5 minutes  Consulting other physicians critical care time: 5 minutes  Total critical care time (exclusive of procedural time) : 45 minutes  Critical care time was exclusive of separately billable procedures and treating other patients and teaching time.  Critical care was necessary to treat or prevent imminent or life-threatening deterioration of the following conditions: Electrolytic disturbance, Hypercalcemia.  Critical care was time spent personally by me on the following activities: blood draw for specimens, development of treatment plan with patient or surrogate, discussions with consultants, interpretation of cardiac output measurements, evaluation of patient's response to treatment, examination of patient, obtaining history from patient or surrogate, ordering and performing treatments and interventions, ordering and review of laboratory studies, re-evaluation of patient's condition and review of old charts.        ED Vital Signs:  Vitals:    06/22/24 1911 06/22/24 1941 06/22/24 1945 06/22/24 1947   BP: (!) 180/97   (!) 163/80   Pulse: (!) 120 88  94   Resp: 18  19    Temp: 98.4 °F (36.9 °C)      TempSrc: Oral      SpO2: 98%   98%   Weight: 77.5 kg (170 lb 13.7 oz)      Height: 5' 3" (1.6 m)       06/22/24 " 2000 06/22/24 2002 06/22/24 2030   BP:  (!) 149/77 (!) 146/76   Pulse:  86 84   Resp: (!) 21 18 20   Temp:      TempSrc:      SpO2:  97% 100%   Weight:      Height:          Abnormal Lab Results:  Labs Reviewed   URINALYSIS, REFLEX TO URINE CULTURE - Abnormal; Notable for the following components:       Result Value    Appearance, UA Hazy (*)     Protein, UA 1+ (*)     Occult Blood UA 2+ (*)     All other components within normal limits    Narrative:     Specimen Source->Urine   URINALYSIS MICROSCOPIC - Abnormal; Notable for the following components:    RBC, UA 26 (*)     WBC, UA 8 (*)     Hyaline Casts, UA 4 (*)     All other components within normal limits    Narrative:     Specimen Source->Urine        All Lab Results:  Results for orders placed or performed during the hospital encounter of 06/22/24   Urinalysis, Reflex to Urine Culture Urine, Clean Catch    Specimen: Urine   Result Value Ref Range    Specimen UA Urine, Clean Catch     Color, UA Yellow Yellow, Straw, Autumn    Appearance, UA Hazy (A) Clear    pH, UA 7.0 5.0 - 8.0    Specific Gravity, UA 1.010 1.005 - 1.030    Protein, UA 1+ (A) Negative    Glucose, UA Negative Negative    Ketones, UA Negative Negative    Bilirubin (UA) Negative Negative    Occult Blood UA 2+ (A) Negative    Nitrite, UA Negative Negative    Urobilinogen, UA Negative <2.0 EU/dL    Leukocytes, UA Negative Negative   Urinalysis Microscopic   Result Value Ref Range    RBC, UA 26 (H) 0 - 4 /hpf    WBC, UA 8 (H) 0 - 5 /hpf    Bacteria None None-Occ /hpf    Squam Epithel, UA 11 /hpf    Hyaline Casts, UA 4 (A) 0-1/lpf /lpf    Microscopic Comment SEE COMMENT    EKG 12-lead   Result Value Ref Range    QRS Duration 70 ms    OHS QTC Calculation 636 ms       Imaging Results:  Imaging Results    None          The EKG was ordered, reviewed, and independently interpreted by the ED provider.  Interpretation time: 19:13  Rate: 112 BPM  Rhythm: Sinus tachycardia with short LA  Interpretation:  Prolonged QT. No STEMI.           The Emergency Provider reviewed the vital signs and test results, which are outlined above.     ED Discussion     9:03 PM: Discussed pt's case with Dr. Oneil Otero MD (Nephrology) who recommends to start IV fluids, NS at 100 ml/hr.    9:10 PM: Discussed case with Luisa Luna NP (Hospital Medicine). Dr. Mora agrees with current care and management of pt and accepts admission.   Admitting Service: Hospital Medicine   Admitting Physician: Dr. Mora  Admit to: obs, med/tele    9:10 PM: Re-evaluated pt. I have discussed test results, shared treatment plan, and the need for admission with patient and family at bedside. Pt and family express understanding at this time and agree with all information. All questions answered. Pt and family have no further questions or concerns at this time. Pt is ready for admit.         Medical Decision Making  Differential diagnosis: Acute on chronic renal failure, hypercalcemia, dehydration, electrolyte disturbance    Patient was evaluated history physical examination.  Chart review was undertaken.  Patient was markedly elevated calcium.  She was given a L of fluid he was being admitted to Hospital Medicine for further evaluation and treatment    Amount and/or Complexity of Data Reviewed  External Data Reviewed: labs and notes.     Details: Calcium is 12.2 today.  This corrects at 13.1 with albumin.  Labs: ordered. Decision-making details documented in ED Course.     Details: Patient was UA is benign.  ECG/medicine tests: ordered and independent interpretation performed. Decision-making details documented in ED Course.     Details: Sinus tachycardia with short VA  Discussion of management or test interpretation with external provider(s): Discussed the case with Nephrology as well as Hospital Medicine.    Risk  OTC drugs.  Prescription drug management.  Drug therapy requiring intensive monitoring for toxicity.  Decision regarding  hospitalization.    Critical Care  Total time providing critical care: 45 minutes                ED Medication(s):  Medications   sodium chloride 0.9% bolus 1,000 mL 1,000 mL (1,000 mLs Intravenous New Bag 6/22/24 2041)       New Prescriptions    No medications on file               Scribe Attestation:   Scribe #1: I performed the above scribed service and the documentation accurately describes the services I performed. I attest to the accuracy of the note.     Attending:   Physician Attestation Statement for Scribe #1: I, Sergio Rosales Jr., MD, personally performed the services described in this documentation, as scribed by Michelle Harrington, in my presence, and it is both accurate and complete.           Clinical Impression       ICD-10-CM ICD-9-CM   1. Acute renal failure, unspecified acute renal failure type  N17.9 584.9   2. Weakness  R53.1 780.79   3. Hypercalcemia  E83.52 275.42   4. Hypoalbuminemia  E88.09 273.8       Disposition:   Disposition: Placed in Observation  Condition: Stable       Sergio Rosales Jr., MD  06/22/24 1683

## 2024-06-23 NOTE — ASSESSMENT & PLAN NOTE
Patient is not currently on statin.will recommend patient begin statin at time of hospital discharge. Last Lipid Panel:   Lab Results   Component Value Date    CHOL 204 (H) 06/03/2024    HDL 33 (L) 06/03/2024    LDLCALC 133.4 06/03/2024    TRIG 188 (H) 06/03/2024    CHOLHDL 16.2 (L) 06/03/2024   Plan:  -low fat/low calorie diet  The 10-year ASCVD risk score (Otilio HUTCHINS, et al., 2019) is: 19.4%    Values used to calculate the score:      Age: 54 years      Sex: Female      Is Non- : Yes      Diabetic: Yes      Tobacco smoker: No      Systolic Blood Pressure: 129 mmHg      Is BP treated: Yes      HDL Cholesterol: 33 mg/dL      Total Cholesterol: 204 mg/dL  Would benefit from statin  Defer to primary care provider

## 2024-06-23 NOTE — PROGRESS NOTES
[facility] Medicine  Progress Note    Patient Name: Rosibel Herrera  MRN: [id number]  Patient Class: OP- Observation   Admission Date: 6/22/2024  Length of Stay: 0 days  Attending Physician: Tammy Granger MD  Primary Care Provider: Briana Vu DO        Subjective:     Principal Problem:Hypercalcemia        HPI:  Rosibel Herrera is a 54 y.o. female with a PMH  has a past medical history of Calculus of kidney (4/9/2014 2:20:53 PM), Complications affecting other specified body systems, hypertension (3/26/2014 1:38:44 PM), Excessive or frequent menstruation (3/20/2014 9:22:15 AM), Hypertension, and Leiomyoma of uterus (3/20/2014 9:23:21 AM). who presented to the ED directed by primary nephrologist for abnormal lab findings consist of hypercalcemia.  Patient presented with persistent complaints of fatigue, nausea, and constipation x2 weeks duration and was found to have hypercalcemia measuring 12.1 with corrected calcium measuring 13.1.  Patient denied endorsing any other symptoms and reported being in her usual state of health with no other concerns or complaints.  Nephrology was consulted by ED staff to obtain further recommendations and recommended patient be initiated on normal saline at 100 cc/hour with repeat labs in the morning.  Patient admitted to Hospital Medicine under observation for continued medical management.    PCP: Briana Vu    Overview/Hospital Course:  6/23 admitted for hypercalcemia. Instructed to come to emergency department due to labs. Unclear etiology. Referral to nephrology by primary care provider due to abnormal kidney function. States urinary frequency, frothy in character. Associated with confusion, nausea, vomiting, fatigue, reduced appetite. Nephrology consulted. Increase fluids    Interval History: See hospital course for today      Review of Systems   Constitutional:  Positive for activity change, appetite change and fatigue.    HENT:  Negative for trouble swallowing.    Respiratory:  Negative for shortness of breath.    Cardiovascular:  Negative for chest pain and leg swelling.   Gastrointestinal:  Positive for constipation, nausea and vomiting. Negative for abdominal pain.   Genitourinary:  Positive for frequency.   Neurological:  Positive for weakness.   Psychiatric/Behavioral:  Positive for confusion. Negative for agitation, behavioral problems and dysphoric mood. The patient is not nervous/anxious.      Objective:     Vital Signs (Most Recent):  Temp: 98.3 °F (36.8 °C) (06/23/24 0747)  Pulse: 67 (06/23/24 0800)  Resp: 18 (06/23/24 0747)  BP: 129/72 (06/23/24 0747)  SpO2: 95 % (06/23/24 0747) Vital Signs (24h Range):  Temp:  [97.7 °F (36.5 °C)-98.4 °F (36.9 °C)] 98.3 °F (36.8 °C)  Pulse:  [] 67  Resp:  [16-25] 18  SpO2:  [94 %-100 %] 95 %  BP: (122-180)/(69-97) 129/72     Weight: 77.5 kg (170 lb 13.7 oz)  Body mass index is 30.27 kg/m².  No intake or output data in the 24 hours ending 06/23/24 0941      Physical Exam  Vitals and nursing note reviewed.   Constitutional:       General: She is not in acute distress.     Appearance: She is ill-appearing. She is not toxic-appearing.   HENT:      Head: Normocephalic and atraumatic.   Cardiovascular:      Rate and Rhythm: Normal rate.   Pulmonary:      Effort: Pulmonary effort is normal. No respiratory distress.   Abdominal:      Palpations: Abdomen is soft.      Tenderness: There is no abdominal tenderness.      Comments: Dullness to percussion in all quadrants   Musculoskeletal:      Right lower leg: No edema.      Left lower leg: No edema.   Skin:     General: Skin is warm.   Neurological:      Mental Status: She is alert and oriented to person, place, and time.      Motor: Weakness present.   Psychiatric:         Mood and Affect: Mood normal.         Speech: Speech normal.         Behavior: Behavior normal.             Significant Labs: All pertinent labs within the past 24 hours  "have been reviewed.  CBC:   Recent Labs   Lab 06/22/24  0948 06/23/24  0434   WBC 5.75 5.29   HGB 12.8 12.9   HCT 41.7 40.1    333     CMP:   Recent Labs   Lab 06/22/24  0948 06/23/24  0434   * 137   K 4.4 3.7    104   CO2 25 23    80   BUN 21* 17   CREATININE 1.6* 1.5*   CALCIUM 12.1* 11.1*   PROT  --  10.5*   ALBUMIN 2.8* 2.7*   BILITOT  --  0.6   ALKPHOS  --  92   AST  --  22   ALT  --  16   ANIONGAP 10 10     POCT Glucose:   Recent Labs   Lab 06/22/24  2140 06/23/24  0042 06/23/24  0604   POCTGLUCOSE 111* 140* 81     Urine Studies:   Recent Labs   Lab 06/22/24  1918   COLORU Yellow   APPEARANCEUA Hazy*   PHUR 7.0   SPECGRAV 1.010   PROTEINUA 1+*   GLUCUA Negative   KETONESU Negative   BILIRUBINUA Negative   OCCULTUA 2+*   NITRITE Negative   UROBILINOGEN Negative   LEUKOCYTESUR Negative   RBCUA 26*   WBCUA 8*   BACTERIA None   SQUAMEPITHEL 11   HYALINECASTS 4*       Significant Imaging: I have reviewed all pertinent imaging results/findings within the past 24 hours.    Assessment/Plan:      * Hypercalcemia  The patient has hypercalcemia that is currently uncontrolled. The patient has the following symptoms due to their hypercalcemia: weakness, fatigue, and constipation. The hypercalcemia is likely due to  unknown etiology currently but is noted to be on HCTZ as well as Vit D outpatient . We will obtain the following labs to work up the hypercalcemia:  currently underway . We will treat the hypercalcemia with: IV fluids ordered at a rate of 100 ml/hr.  Patient also started on stool softeners.  Home Hyzaar and vitamin-D currently on hold.  Patient needing PTH and vitamin-D level.  Their latest calcium has been reviewed and is listed below.  No results found for: "CAION"  Minimal improvement with fluids  Increase rate of fluids  Nephrology consulted     SHENA (acute kidney injury)  Patient with acute kidney injury/acute renal failure vs CKD. Etiology currently unknown but may be due to  " progressive disease versus adverse side effect from medications.  SHENA is currently  undergoing medical management . Baseline creatinine  1.2-1.5  - Labs reviewed- Renal function/electrolytes with Estimated Creatinine Clearance: 41.3 mL/min (A) (based on SCr of 1.5 mg/dL (H)). according to latest data. Monitor urine output and serial BMP and adjust therapy as needed. Avoid nephrotoxins and renally dose meds for GFR listed above.  Patient currently follows Nephrology outpatient.  Nephrology consulted     Class 1 obesity due to excess calories with body mass index (BMI) of 30.0 to 30.9 in adult  Body mass index is 30.27 kg/m². Morbid obesity complicates all aspects of disease management from diagnostic modalities to treatment. Weight loss encouraged and health benefits explained to patient.       Hypertension  Chronic, controlled. Latest blood pressure and vitals reviewed-     Temp:  [97.7 °F (36.5 °C)-98.4 °F (36.9 °C)]   Pulse:  []   Resp:  [16-25]   BP: (122-180)/(69-97)   SpO2:  [94 %-100 %] .   Home meds for hypertension were reviewed and noted below.   Hypertension Medications               amLODIPine (NORVASC) 5 MG tablet Take 10 mg by mouth.    losartan-hydrochlorothiazide 50-12.5 mg (HYZAAR) 50-12.5 mg per tablet Take 1 tablet by mouth once daily.     While in the hospital, will manage blood pressure as follows; Adjust home antihypertensive regimen as follows- continue amlodipine and hold Hyzaar given SHENA and hypercalcemia    Will utilize p.r.n. blood pressure medication only if patient's blood pressure greater than 180/110 and she develops symptoms such as worsening chest pain or shortness of breath.      Vitamin D deficiency  Patient currently on outpatient supplement replacement therapy currently on hold due to hypercalcemia as noted above.  Plan:  -hold home medications  -f/u with PCP/Nephrology to resume following hospital discharge      Type 2 diabetes mellitus  Patient's FSGs are controlled on  current medication regimen.  Last A1c reviewed-   Lab Results   Component Value Date    HGBA1C 6.2 (H) 06/03/2024     Most recent fingerstick glucose reviewed-   Recent Labs   Lab 06/22/24  2140 06/23/24  0042 06/23/24  0604   POCTGLUCOSE 111* 140* 81       Current correctional scale  Low  Titrate as needed  anti-hyperglycemic dose as follows-   Antihyperglycemics (From admission, onward)      Start     Stop Route Frequency Ordered    06/22/24 2232  insulin aspart U-100 pen 0-5 Units         -- SubQ Before meals & nightly PRN 06/22/24 2132        Plan:  Poor po intake   -SSI  -A1c 6.2  -Accu-checks  -Hold oral hypoglycemics while patient is in the hospital  -Hypoglycemic protocol          Dyslipidemia  Patient is not currently on statin.will recommend patient begin statin at time of hospital discharge. Last Lipid Panel:   Lab Results   Component Value Date    CHOL 204 (H) 06/03/2024    HDL 33 (L) 06/03/2024    LDLCALC 133.4 06/03/2024    TRIG 188 (H) 06/03/2024    CHOLHDL 16.2 (L) 06/03/2024   Plan:  -low fat/low calorie diet  The 10-year ASCVD risk score (Otilio HUTCHINS, et al., 2019) is: 19.4%    Values used to calculate the score:      Age: 54 years      Sex: Female      Is Non- : Yes      Diabetic: Yes      Tobacco smoker: No      Systolic Blood Pressure: 129 mmHg      Is BP treated: Yes      HDL Cholesterol: 33 mg/dL      Total Cholesterol: 204 mg/dL  Would benefit from statin  Defer to primary care provider         VTE Risk Mitigation (From admission, onward)           Ordered     enoxaparin injection 40 mg  Daily         06/22/24 2132     IP VTE HIGH RISK PATIENT  Once         06/22/24 2132     Place sequential compression device  Until discontinued         06/22/24 2132                    Discharge Planning   SHER:      Code Status: Full Code   Is the patient medically ready for discharge?:     Reason for patient still in hospital (select all that apply): Patient trending condition,  Laboratory test, Treatment, Consult recommendations, and Pending disposition                     Tammy Granger MD  Department of Hospital Medicine   '[place] - Telemetry ([facility])

## 2024-06-23 NOTE — HOSPITAL COURSE
6/23 admitted for hypercalcemia. Instructed to come to emergency department due to labs. Unclear etiology. Referral to nephrology by primary care provider due to abnormal kidney function. States urinary frequency, frothy in character. Associated with confusion, nausea, vomiting, fatigue, reduced appetite. Nephrology consulted. Increase fluids  6/24 hypercalcemia persists. Continue intravenous fluids. Nephrology recommending hem/onc consult due to abnormal kappa/lambda light chains. Patient reports mild confusion, kidney stones but denies bone pain or abdominal pain.  6/25 hem/onc consulted for abnormal free light chain test. Recommendations reviewed with outpatient further workup. Continue fluids. Hypercalcemia improving.    6/26  Hypercalcemia resolved. After discussing with hem/onc, ok to discharge home with close follow up with hem/onc for follow up imaging and bone marrow biopsy for concerns of multiple myeloma. Nephrology recommending holding arb until follow up with primary care provider for monitoring kidney function.    No acute distress. No respiratory distress. On room air. Soft abdomen, nontender. No lower extremity edema. AO3    Patient seen and evaluated by me. Patient was determined to be suitable for discharge. Patient deemed stable for discharge to home with nurse practitioner to visit home program.

## 2024-06-23 NOTE — SUBJECTIVE & OBJECTIVE
Past Medical History:   Diagnosis Date    Calculus of kidney 4/9/2014 2:20:53 PM    [facility] - Urology: KidneyStones-No Additional Notes    Complications affecting other specified body systems, hypertension 3/26/2014 1:38:44 PM    [facility] - Cardiovascular: Hypertension-No Additional Notes    Excessive or frequent menstruation 3/20/2014 9:22:15 AM    [facility] - [facility]: Bleeding, Menorrhagia-No Additional Notes    Hypertension     Leiomyoma of uterus 3/20/2014 9:23:21 AM    [facility] - [facility]: Fibroids/Leiomyoma of Uterus-2.1x1.8cm (POST/LT); 3.9x3.5cm (ANT TO ENDO)       Past Surgical History:   Procedure Laterality Date    ENDOMETRIAL ABLATION      HYSTERECTOMY      fibroids    LITHOTRIPSY         Review of patient's allergies indicates:   Allergen Reactions    Tylox [oxycodone-acetaminophen]        No current facility-administered medications on file prior to encounter.     Current Outpatient Medications on File Prior to Encounter   Medication Sig    amLODIPine (NORVASC) 5 MG tablet Take 10 mg by mouth.    azelastine (ASTELIN) 137 mcg (0.1 %) nasal spray 2 sprays by Nasal route 2 (two) times daily.    cholecalciferol, vitamin D3, 1,250 mcg (50,000 unit) capsule Take 50,000 Units by mouth every 7 days.    doxycycline (MONODOX) 100 MG capsule Take once daily with food.  May cause upset stomach.    fluticasone propionate (FLONASE) 50 mcg/actuation nasal spray by Each Nostril route daily as needed.    losartan-hydrochlorothiazide 50-12.5 mg (HYZAAR) 50-12.5 mg per tablet Take 1 tablet by mouth once daily.    metFORMIN (GLUCOPHAGE) 500 MG tablet Take 500 mg by mouth 2 (two) times daily with meals.    montelukast (SINGULAIR) 10 mg tablet Take 10 mg by mouth once daily.     Family History       Problem Relation (Age of Onset)    Diabetes Father          Tobacco Use    Smoking status: Never    Smokeless tobacco: Never   Substance and Sexual Activity    Alcohol use:  Yes     Comment: rarely    Drug use: No    Sexual activity: Yes     Partners: Male     Birth control/protection: Post-menopausal     Review of Systems   All other systems reviewed and are negative.    Objective:     Vital Signs (Most Recent):  Temp: 98.4 °F (36.9 °C) (06/22/24 1911)  Pulse: 84 (06/22/24 2030)  Resp: 20 (06/22/24 2030)  BP: (!) 146/76 (06/22/24 2030)  SpO2: 100 % (06/22/24 2030) Vital Signs (24h Range):  Temp:  [98.4 °F (36.9 °C)] 98.4 °F (36.9 °C)  Pulse:  [] 84  Resp:  [18-21] 20  SpO2:  [97 %-100 %] 100 %  BP: (146-180)/(76-97) 146/76     Weight: 77.5 kg (170 lb 13.7 oz)  Body mass index is 30.27 kg/m².     Physical Exam  Vitals reviewed.   Constitutional:       General: She is not in acute distress.     Appearance: Normal appearance. She is normal weight. She is not ill-appearing, toxic-appearing or diaphoretic.   HENT:      Head: Normocephalic and atraumatic.      Right Ear: External ear normal.      Left Ear: External ear normal.      Nose: Nose normal. No congestion or rhinorrhea.      Mouth/Throat:      Mouth: Mucous membranes are moist.      Pharynx: Oropharynx is clear. No oropharyngeal exudate or posterior oropharyngeal erythema.   Eyes:      General: No scleral icterus.     Extraocular Movements: Extraocular movements intact.      Conjunctiva/sclera: Conjunctivae normal.      Pupils: Pupils are equal, round, and reactive to light.   Neck:      Vascular: No carotid bruit.   Cardiovascular:      Rate and Rhythm: Normal rate and regular rhythm.      Pulses: Normal pulses.      Heart sounds: Normal heart sounds. No murmur heard.     No friction rub. No gallop.   Pulmonary:      Effort: Pulmonary effort is normal. No respiratory distress.      Breath sounds: Normal breath sounds. No stridor. No wheezing, rhonchi or rales.   Chest:      Chest wall: No tenderness.   Abdominal:      General: Abdomen is flat. Bowel sounds are normal. There is no distension.      Palpations: Abdomen is soft.       Tenderness: There is no abdominal tenderness. There is no right CVA tenderness, left CVA tenderness, guarding or rebound.      Hernia: No hernia is present.   Musculoskeletal:         General: No swelling, tenderness, deformity or signs of injury. Normal range of motion.      Cervical back: Normal range of motion and neck supple. No rigidity or tenderness.      Right lower leg: No edema.      Left lower leg: No edema.   Lymphadenopathy:      Cervical: No cervical adenopathy.   Skin:     General: Skin is warm and dry.      Capillary Refill: Capillary refill takes less than 2 seconds.      Coloration: Skin is not jaundiced or pale.      Findings: No bruising, erythema, lesion or rash.   Neurological:      General: No focal deficit present.      Mental Status: She is alert and oriented to person, place, and time. Mental status is at baseline.      Cranial Nerves: No cranial nerve deficit.      Sensory: No sensory deficit.      Motor: No weakness.      Coordination: Coordination normal.   Psychiatric:         Mood and Affect: Mood normal.         Behavior: Behavior normal.         Thought Content: Thought content normal.         Judgment: Judgment normal.              CRANIAL NERVES     CN III, IV, VI   Pupils are equal, round, and reactive to light.       Significant Labs: All pertinent labs within the past 24 hours have been reviewed.    Significant Imaging: I have reviewed all pertinent imaging results/findings within the past 24 hours.    LABS:  Recent Results (from the past 24 hour(s))   Protein / creatinine ratio, urine    Collection Time: 06/22/24  9:43 AM   Result Value Ref Range    Protein, Urine Random 43 (H) 0 - 15 mg/dL    Creatinine, Urine 68.0 15.0 - 325.0 mg/dL    Prot/Creat Ratio, Urine 0.63 (H) 0.00 - 0.20   Renal Function Panel    Collection Time: 06/22/24  9:48 AM   Result Value Ref Range    Glucose 104 70 - 110 mg/dL    Sodium 135 (L) 136 - 145 mmol/L    Potassium 4.4 3.5 - 5.1 mmol/L     Chloride 100 95 - 110 mmol/L    CO2 25 23 - 29 mmol/L    BUN 21 (H) 6 - 20 mg/dL    Calcium 12.1 (HH) 8.7 - 10.5 mg/dL    Creatinine 1.6 (H) 0.5 - 1.4 mg/dL    Albumin 2.8 (L) 3.5 - 5.2 g/dL    Phosphorus 3.7 2.7 - 4.5 mg/dL    eGFR 37.6 (A) >60 mL/min/1.73 m^2    Anion Gap 10 8 - 16 mmol/L   CBC Auto Differential    Collection Time: 06/22/24  9:48 AM   Result Value Ref Range    WBC 5.75 3.90 - 12.70 K/uL    RBC 4.56 4.00 - 5.40 M/uL    Hemoglobin 12.8 12.0 - 16.0 g/dL    Hematocrit 41.7 37.0 - 48.5 %    MCV 91 82 - 98 fL    MCH 28.1 27.0 - 31.0 pg    MCHC 30.7 (L) 32.0 - 36.0 g/dL    RDW 14.0 11.5 - 14.5 %    Platelets 354 150 - 450 K/uL    MPV 11.4 9.2 - 12.9 fL    Immature Granulocytes 0.2 0.0 - 0.5 %    Gran # (ANC) 3.1 1.8 - 7.7 K/uL    Immature Grans (Abs) 0.01 0.00 - 0.04 K/uL    Lymph # 1.5 1.0 - 4.8 K/uL    Mono # 0.7 0.3 - 1.0 K/uL    Eos # 0.4 0.0 - 0.5 K/uL    Baso # 0.06 0.00 - 0.20 K/uL    nRBC 0 0 /100 WBC    Gran % 53.0 38.0 - 73.0 %    Lymph % 25.2 18.0 - 48.0 %    Mono % 12.9 4.0 - 15.0 %    Eosinophil % 7.7 0.0 - 8.0 %    Basophil % 1.0 0.0 - 1.9 %    Differential Method Automated    EKG 12-lead    Collection Time: 06/22/24  7:13 PM   Result Value Ref Range    QRS Duration 70 ms    [facility] QTC Calculation 636 ms   Urinalysis, Reflex to Urine Culture Urine, Clean Catch    Collection Time: 06/22/24  7:18 PM    Specimen: Urine   Result Value Ref Range    Specimen UA Urine, Clean Catch     Color, UA Yellow Yellow, Straw, Autumn    Appearance, UA Hazy (A) Clear    pH, UA 7.0 5.0 - 8.0    Specific Gravity, UA 1.010 1.005 - 1.030    Protein, UA 1+ (A) Negative    Glucose, UA Negative Negative    Ketones, UA Negative Negative    Bilirubin (UA) Negative Negative    Occult Blood UA 2+ (A) Negative    Nitrite, UA Negative Negative    Urobilinogen, UA Negative <2.0 EU/dL    Leukocytes, UA Negative Negative   Urinalysis Microscopic    Collection Time: 06/22/24  7:18 PM   Result Value Ref Range    RBC, UA 26 (H) 0 -  4 /hpf    WBC, UA 8 (H) 0 - 5 /hpf    Bacteria None None-Occ /hpf    Squam Epithel, UA 11 /hpf    Hyaline Casts, UA 4 (A) 0-1/lpf /lpf    Microscopic Comment SEE COMMENT        RADIOLOGY  No results found.    EKG    MICROBIOLOGY    MDM

## 2024-06-23 NOTE — ASSESSMENT & PLAN NOTE
Chronic, controlled. Latest blood pressure and vitals reviewed-     Temp:  [97.7 °F (36.5 °C)-98.4 °F (36.9 °C)]   Pulse:  []   Resp:  [16-25]   BP: (122-180)/(69-97)   SpO2:  [94 %-100 %] .   Home meds for hypertension were reviewed and noted below.   Hypertension Medications               amLODIPine (NORVASC) 5 MG tablet Take 10 mg by mouth.    losartan-hydrochlorothiazide 50-12.5 mg (HYZAAR) 50-12.5 mg per tablet Take 1 tablet by mouth once daily.     While in the hospital, will manage blood pressure as follows; Adjust home antihypertensive regimen as follows- continue amlodipine and hold Hyzaar given SHENA and hypercalcemia    Will utilize p.r.n. blood pressure medication only if patient's blood pressure greater than 180/110 and she develops symptoms such as worsening chest pain or shortness of breath.

## 2024-06-23 NOTE — H&P
[facility] - Emergency Dept.  [facility] Medicine  History & Physical    Patient Name: Rosibel Herrera  MRN: [id number]  Patient Class: OP- Observation  Admission Date: 6/22/2024  Attending Physician: Fransisco Mora MD   Primary Care Provider: Briana Vu DO         Patient information was obtained from patient, past medical records, and ER records.     Subjective:     Principal Problem:Hypercalcemia    Chief Complaint:   Chief Complaint   Patient presents with    Fatigue     Pt went this morning to Nephrology for scheduled labs. Critical calcium level and was told to come to the er. Pt complaining of extreme fatigue and constipation x2 weeks. +N/-V/-D. Hx of diabetes and hypertension.         HPI: Rosibel Herrera is a 54 y.o. female with a PMH  has a past medical history of Calculus of kidney (4/9/2014 2:20:53 PM), Complications affecting other specified body systems, hypertension (3/26/2014 1:38:44 PM), Excessive or frequent menstruation (3/20/2014 9:22:15 AM), Hypertension, and Leiomyoma of uterus (3/20/2014 9:23:21 AM). who presented to the ED directed by primary nephrologist for abnormal lab findings consist of hypercalcemia.  Patient presented with persistent complaints of fatigue, nausea, and constipation x2 weeks duration and was found to have hypercalcemia measuring 12.1 with corrected calcium measuring 13.1.  Patient denied endorsing any other symptoms and reported being in her usual state of health with no other concerns or complaints.  Nephrology was consulted by ED staff to obtain further recommendations and recommended patient be initiated on normal saline at 100 cc/hour with repeat labs in the morning.  Patient admitted to Hospital Medicine under observation for continued medical management.    PCP: Briana Vu    Past Medical History:   Diagnosis Date    Calculus of kidney 4/9/2014 2:20:53 PM    [facility] Historical - Urology: KidneyStones-No Additional Notes     Complications affecting other specified body systems, hypertension 3/26/2014 1:38:44 PM    [facility] Historical - Cardiovascular: Hypertension-No Additional Notes    Excessive or frequent menstruation 3/20/2014 9:22:15 AM    [facility] Historical - [facility]: Bleeding, Menorrhagia-No Additional Notes    Hypertension     Leiomyoma of uterus 3/20/2014 9:23:21 AM    [facility] Historical - [facility]: Fibroids/Leiomyoma of Uterus-2.1x1.8cm (POST/LT); 3.9x3.5cm (ANT TO ENDO)       Past Surgical History:   Procedure Laterality Date    ENDOMETRIAL ABLATION      HYSTERECTOMY      fibroids    LITHOTRIPSY         Review of patient's allergies indicates:   Allergen Reactions    Tylox [oxycodone-acetaminophen]        No current facility-administered medications on file prior to encounter.     Current Outpatient Medications on File Prior to Encounter   Medication Sig    amLODIPine (NORVASC) 5 MG tablet Take 10 mg by mouth.    azelastine (ASTELIN) 137 mcg (0.1 %) nasal spray 2 sprays by Nasal route 2 (two) times daily.    cholecalciferol, vitamin D3, 1,250 mcg (50,000 unit) capsule Take 50,000 Units by mouth every 7 days.    doxycycline (MONODOX) 100 MG capsule Take once daily with food.  May cause upset stomach.    fluticasone propionate (FLONASE) 50 mcg/actuation nasal spray by Each Nostril route daily as needed.    losartan-hydrochlorothiazide 50-12.5 mg (HYZAAR) 50-12.5 mg per tablet Take 1 tablet by mouth once daily.    metFORMIN (GLUCOPHAGE) 500 MG tablet Take 500 mg by mouth 2 (two) times daily with meals.    montelukast (SINGULAIR) 10 mg tablet Take 10 mg by mouth once daily.     Family History       Problem Relation (Age of Onset)    Diabetes Father          Tobacco Use    Smoking status: Never    Smokeless tobacco: Never   Substance and Sexual Activity    Alcohol use: Yes     Comment: rarely    Drug use: No    Sexual activity: Yes     Partners: Male     Birth control/protection: Post-menopausal     Review of Systems   All  other systems reviewed and are negative.    Objective:     Vital Signs (Most Recent):  Temp: 98.4 °F (36.9 °C) (06/22/24 1911)  Pulse: 84 (06/22/24 2030)  Resp: 20 (06/22/24 2030)  BP: (!) 146/76 (06/22/24 2030)  SpO2: 100 % (06/22/24 2030) Vital Signs (24h Range):  Temp:  [98.4 °F (36.9 °C)] 98.4 °F (36.9 °C)  Pulse:  [] 84  Resp:  [18-21] 20  SpO2:  [97 %-100 %] 100 %  BP: (146-180)/(76-97) 146/76     Weight: 77.5 kg (170 lb 13.7 oz)  Body mass index is 30.27 kg/m².     Physical Exam  Vitals reviewed.   Constitutional:       General: She is not in acute distress.     Appearance: Normal appearance. She is normal weight. She is not ill-appearing, toxic-appearing or diaphoretic.   HENT:      Head: Normocephalic and atraumatic.      Right Ear: External ear normal.      Left Ear: External ear normal.      Nose: Nose normal. No congestion or rhinorrhea.      Mouth/Throat:      Mouth: Mucous membranes are moist.      Pharynx: Oropharynx is clear. No oropharyngeal exudate or posterior oropharyngeal erythema.   Eyes:      General: No scleral icterus.     Extraocular Movements: Extraocular movements intact.      Conjunctiva/sclera: Conjunctivae normal.      Pupils: Pupils are equal, round, and reactive to light.   Neck:      Vascular: No carotid bruit.   Cardiovascular:      Rate and Rhythm: Normal rate and regular rhythm.      Pulses: Normal pulses.      Heart sounds: Normal heart sounds. No murmur heard.     No friction rub. No gallop.   Pulmonary:      Effort: Pulmonary effort is normal. No respiratory distress.      Breath sounds: Normal breath sounds. No stridor. No wheezing, rhonchi or rales.   Chest:      Chest wall: No tenderness.   Abdominal:      General: Abdomen is flat. Bowel sounds are normal. There is no distension.      Palpations: Abdomen is soft.      Tenderness: There is no abdominal tenderness. There is no right CVA tenderness, left CVA tenderness, guarding or rebound.      Hernia: No hernia is  present.   Musculoskeletal:         General: No swelling, tenderness, deformity or signs of injury. Normal range of motion.      Cervical back: Normal range of motion and neck supple. No rigidity or tenderness.      Right lower leg: No edema.      Left lower leg: No edema.   Lymphadenopathy:      Cervical: No cervical adenopathy.   Skin:     General: Skin is warm and dry.      Capillary Refill: Capillary refill takes less than 2 seconds.      Coloration: Skin is not jaundiced or pale.      Findings: No bruising, erythema, lesion or rash.   Neurological:      General: No focal deficit present.      Mental Status: She is alert and oriented to person, place, and time. Mental status is at baseline.      Cranial Nerves: No cranial nerve deficit.      Sensory: No sensory deficit.      Motor: No weakness.      Coordination: Coordination normal.   Psychiatric:         Mood and Affect: Mood normal.         Behavior: Behavior normal.         Thought Content: Thought content normal.         Judgment: Judgment normal.              CRANIAL NERVES     CN III, IV, VI   Pupils are equal, round, and reactive to light.       Significant Labs: All pertinent labs within the past 24 hours have been reviewed.    Significant Imaging: I have reviewed all pertinent imaging results/findings within the past 24 hours.    LABS:  Recent Results (from the past 24 hour(s))   Protein / creatinine ratio, urine    Collection Time: 06/22/24  9:43 AM   Result Value Ref Range    Protein, Urine Random 43 (H) 0 - 15 mg/dL    Creatinine, Urine 68.0 15.0 - 325.0 mg/dL    Prot/Creat Ratio, Urine 0.63 (H) 0.00 - 0.20   Renal Function Panel    Collection Time: 06/22/24  9:48 AM   Result Value Ref Range    Glucose 104 70 - 110 mg/dL    Sodium 135 (L) 136 - 145 mmol/L    Potassium 4.4 3.5 - 5.1 mmol/L    Chloride 100 95 - 110 mmol/L    CO2 25 23 - 29 mmol/L    BUN 21 (H) 6 - 20 mg/dL    Calcium 12.1 (HH) 8.7 - 10.5 mg/dL    Creatinine 1.6 (H) 0.5 - 1.4 mg/dL     Albumin 2.8 (L) 3.5 - 5.2 g/dL    Phosphorus 3.7 2.7 - 4.5 mg/dL    eGFR 37.6 (A) >60 mL/min/1.73 m^2    Anion Gap 10 8 - 16 mmol/L   CBC Auto Differential    Collection Time: 06/22/24  9:48 AM   Result Value Ref Range    WBC 5.75 3.90 - 12.70 K/uL    RBC 4.56 4.00 - 5.40 M/uL    Hemoglobin 12.8 12.0 - 16.0 g/dL    Hematocrit 41.7 37.0 - 48.5 %    MCV 91 82 - 98 fL    MCH 28.1 27.0 - 31.0 pg    MCHC 30.7 (L) 32.0 - 36.0 g/dL    RDW 14.0 11.5 - 14.5 %    Platelets 354 150 - 450 K/uL    MPV 11.4 9.2 - 12.9 fL    Immature Granulocytes 0.2 0.0 - 0.5 %    Gran # (ANC) 3.1 1.8 - 7.7 K/uL    Immature Grans (Abs) 0.01 0.00 - 0.04 K/uL    Lymph # 1.5 1.0 - 4.8 K/uL    Mono # 0.7 0.3 - 1.0 K/uL    Eos # 0.4 0.0 - 0.5 K/uL    Baso # 0.06 0.00 - 0.20 K/uL    nRBC 0 0 /100 WBC    Gran % 53.0 38.0 - 73.0 %    Lymph % 25.2 18.0 - 48.0 %    Mono % 12.9 4.0 - 15.0 %    Eosinophil % 7.7 0.0 - 8.0 %    Basophil % 1.0 0.0 - 1.9 %    Differential Method Automated    EKG 12-lead    Collection Time: 06/22/24  7:13 PM   Result Value Ref Range    QRS Duration 70 ms    [facility] QTC Calculation 636 ms   Urinalysis, Reflex to Urine Culture Urine, Clean Catch    Collection Time: 06/22/24  7:18 PM    Specimen: Urine   Result Value Ref Range    Specimen UA Urine, Clean Catch     Color, UA Yellow Yellow, Straw, Autumn    Appearance, UA Hazy (A) Clear    pH, UA 7.0 5.0 - 8.0    Specific Gravity, UA 1.010 1.005 - 1.030    Protein, UA 1+ (A) Negative    Glucose, UA Negative Negative    Ketones, UA Negative Negative    Bilirubin (UA) Negative Negative    Occult Blood UA 2+ (A) Negative    Nitrite, UA Negative Negative    Urobilinogen, UA Negative <2.0 EU/dL    Leukocytes, UA Negative Negative   Urinalysis Microscopic    Collection Time: 06/22/24  7:18 PM   Result Value Ref Range    RBC, UA 26 (H) 0 - 4 /hpf    WBC, UA 8 (H) 0 - 5 /hpf    Bacteria None None-Occ /hpf    Squam Epithel, UA 11 /hpf    Hyaline Casts, UA 4 (A) 0-1/lpf /lpf    Microscopic Comment  "SEE COMMENT        RADIOLOGY  No results found.    EKG    MICROBIOLOGY    [facility]    Assessment/Plan:     * Hypercalcemia  The patient has hypercalcemia that is currently uncontrolled. The patient has the following symptoms due to their hypercalcemia: weakness, fatigue, and constipation. The hypercalcemia is likely due to  unknown etiology currently but is noted to be on HCTZ as well as Vit D outpatient . We will obtain the following labs to work up the hypercalcemia:  currently underway . We will treat the hypercalcemia with: IV fluids ordered at a rate of 100 ml/hr.  Patient also started on stool softeners.  Home Hyzaar and vitamin-D currently on hold.  Patient needing PTH and vitamin-D level.  Their latest calcium has been reviewed and is listed below.  No results found for: "CAION"      SHENA (acute kidney injury)  Patient with acute kidney injury/acute renal failure vs CKD. Etiology currently unknown but may be due to  progressive disease versus adverse side effect from medications.  SHENA is currently  undergoing medical management . Baseline creatinine  1.2-1.5  - Labs reviewed- Renal function/electrolytes with Estimated Creatinine Clearance: 38.7 mL/min (A) (based on SCr of 1.6 mg/dL (H)). according to latest data. Monitor urine output and serial BMP and adjust therapy as needed. Avoid nephrotoxins and renally dose meds for GFR listed above.  Patient currently follows Nephrology outpatient.      Hypertension  Chronic, uncontrolled. Latest blood pressure and vitals reviewed-     Temp:  [97.7 °F (36.5 °C)-98.4 °F (36.9 °C)]   Pulse:  []   Resp:  [16-25]   BP: (122-180)/(69-97)   SpO2:  [94 %-100 %] .   Home meds for hypertension were reviewed and noted below.   Hypertension Medications               amLODIPine (NORVASC) 5 MG tablet Take 10 mg by mouth.    losartan-hydrochlorothiazide 50-12.5 mg (HYZAAR) 50-12.5 mg per tablet Take 1 tablet by mouth once daily.     While in the hospital, will manage blood pressure " as follows; Adjust home antihypertensive regimen as follows- continue amlodipine and hold Hyzaar given SHENA and hypercalcemia    Will utilize p.r.n. blood pressure medication only if patient's blood pressure greater than 160/100 and she develops symptoms such as worsening chest pain or shortness of breath.      Type 2 diabetes mellitus  Patient's FSGs are controlled on current medication regimen.  Last A1c reviewed-   Lab Results   Component Value Date    HGBA1C 6.2 (H) 06/03/2024     Most recent fingerstick glucose reviewed-   Recent Labs   Lab 06/22/24 2140 06/23/24  0042   POCTGLUCOSE 111* 140*     Current correctional scale  Low  Titrate as needed  anti-hyperglycemic dose as follows-   Antihyperglycemics (From admission, onward)      Start     Stop Route Frequency Ordered    06/22/24 2232  insulin aspart U-100 pen 0-5 Units         -- SubQ Before meals & nightly PRN 06/22/24 2132        Plan:  -SSI  -A1c  -Accu-checks  -Hold oral hypoglycemics while patient is in the hospital  -Hypoglycemic protocol        Dyslipidemia  Patient is not currently on statin.will recommend patient begin statin at time of hospital discharge. Last Lipid Panel:   Lab Results   Component Value Date    CHOL 204 (H) 06/03/2024    HDL 33 (L) 06/03/2024    LDLCALC 133.4 06/03/2024    TRIG 188 (H) 06/03/2024    CHOLHDL 16.2 (L) 06/03/2024   Plan:  -low fat/low calorie diet      Vitamin D deficiency  Patient currently on outpatient supplement replacement therapy currently on hold due to hypercalcemia as noted above.  Plan:  -hold home medications  -f/u with PCP/Nephrology to resume following hospital discharge      Class 1 obesity due to excess calories with body mass index (BMI) of 30.0 to 30.9 in adult  Body mass index is 30.27 kg/m². Morbid obesity complicates all aspects of disease management from diagnostic modalities to treatment. Weight loss encouraged and health benefits explained to patient.         VTE Risk Mitigation (From  admission, onward)           Ordered     enoxaparin injection 40 mg  Daily         06/22/24 2132     IP VTE HIGH RISK PATIENT  Once         06/22/24 2132     Place sequential compression device  Until discontinued         06/22/24 2132                  //Core Measures   -DVT proph: SCDs, Lovenox   -Surrogate: none provided       Components of this note were documented using a voice recognition system and are subject to errors not corrected at the time the document was proof read. Please contact the author for any clarifications.       On 06/22/2024, patient should be placed in hospital observation services under my care.       Fransisco Mora MD  Department of Hospital Medicine  [facility] - Emergency Dept.

## 2024-06-23 NOTE — ASSESSMENT & PLAN NOTE
Patient's FSGs are controlled on current medication regimen.  Last A1c reviewed-   Lab Results   Component Value Date    HGBA1C 6.2 (H) 06/03/2024     Most recent fingerstick glucose reviewed-   Recent Labs   Lab 06/22/24  2140 06/23/24  0042 06/23/24  0604   POCTGLUCOSE 111* 140* 81       Current correctional scale  Low  Titrate as needed  anti-hyperglycemic dose as follows-   Antihyperglycemics (From admission, onward)      Start     Stop Route Frequency Ordered    06/22/24 2232  insulin aspart U-100 pen 0-5 Units         -- SubQ Before meals & nightly PRN 06/22/24 2132        Plan:  Poor po intake   -SSI  -A1c 6.2  -Accu-checks  -Hold oral hypoglycemics while patient is in the hospital  -Hypoglycemic protocol

## 2024-06-24 DIAGNOSIS — C90.00 MULTIPLE MYELOMA NOT HAVING ACHIEVED REMISSION: Primary | ICD-10-CM

## 2024-06-24 LAB
ALBUMIN SERPL BCP-MCNC: 2.6 G/DL (ref 3.5–5.2)
ALBUMIN SERPL ELPH-MCNC: 2.94 G/DL (ref 3.35–5.55)
ALP SERPL-CCNC: 85 U/L (ref 55–135)
ALPHA1 GLOB SERPL ELPH-MCNC: 0.33 G/DL (ref 0.17–0.41)
ALPHA2 GLOB SERPL ELPH-MCNC: 0.77 G/DL (ref 0.43–0.99)
ALT SERPL W/O P-5'-P-CCNC: 15 U/L (ref 10–44)
ANION GAP SERPL CALC-SCNC: 12 MMOL/L (ref 8–16)
AST SERPL-CCNC: 23 U/L (ref 10–40)
B-GLOBULIN SERPL ELPH-MCNC: 1.25 G/DL (ref 0.5–1.1)
BASOPHILS # BLD AUTO: 0.05 K/UL (ref 0–0.2)
BASOPHILS NFR BLD: 1 % (ref 0–1.9)
BILIRUB SERPL-MCNC: 0.6 MG/DL (ref 0.1–1)
BUN SERPL-MCNC: 16 MG/DL (ref 6–20)
CALCIUM SERPL-MCNC: 10.5 MG/DL (ref 8.7–10.5)
CHLORIDE SERPL-SCNC: 106 MMOL/L (ref 95–110)
CO2 SERPL-SCNC: 19 MMOL/L (ref 23–29)
CREAT SERPL-MCNC: 1.4 MG/DL (ref 0.5–1.4)
DIFFERENTIAL METHOD BLD: NORMAL
EOSINOPHIL # BLD AUTO: 0.4 K/UL (ref 0–0.5)
EOSINOPHIL NFR BLD: 7.1 % (ref 0–8)
ERYTHROCYTE [DISTWIDTH] IN BLOOD BY AUTOMATED COUNT: 13.7 % (ref 11.5–14.5)
EST. GFR  (NO RACE VARIABLE): 44 ML/MIN/1.73 M^2
GAMMA GLOB SERPL ELPH-MCNC: 4.01 G/DL (ref 0.67–1.58)
GLUCOSE SERPL-MCNC: 81 MG/DL (ref 70–110)
HCT VFR BLD AUTO: 38.5 % (ref 37–48.5)
HGB BLD-MCNC: 12.5 G/DL (ref 12–16)
IMM GRANULOCYTES # BLD AUTO: 0.01 K/UL (ref 0–0.04)
IMM GRANULOCYTES NFR BLD AUTO: 0.2 % (ref 0–0.5)
KAPPA LC SER QL IA: 53.71 MG/DL (ref 0.33–1.94)
KAPPA LC/LAMBDA SER IA: 7.18 (ref 0.26–1.65)
LAMBDA LC SER QL IA: 7.48 MG/DL (ref 0.57–2.63)
LYMPHOCYTES # BLD AUTO: 1.1 K/UL (ref 1–4.8)
LYMPHOCYTES NFR BLD: 20.3 % (ref 18–48)
MCH RBC QN AUTO: 29.1 PG (ref 27–31)
MCHC RBC AUTO-ENTMCNC: 32.5 G/DL (ref 32–36)
MCV RBC AUTO: 90 FL (ref 82–98)
MONOCYTES # BLD AUTO: 0.7 K/UL (ref 0.3–1)
MONOCYTES NFR BLD: 13.7 % (ref 4–15)
NEUTROPHILS # BLD AUTO: 3 K/UL (ref 1.8–7.7)
NEUTROPHILS NFR BLD: 57.7 % (ref 38–73)
NRBC BLD-RTO: 0 /100 WBC
PLATELET # BLD AUTO: 309 K/UL (ref 150–450)
PMV BLD AUTO: 10.1 FL (ref 9.2–12.9)
POCT GLUCOSE: 105 MG/DL (ref 70–110)
POCT GLUCOSE: 172 MG/DL (ref 70–110)
POCT GLUCOSE: 85 MG/DL (ref 70–110)
POCT GLUCOSE: 93 MG/DL (ref 70–110)
POTASSIUM SERPL-SCNC: 4 MMOL/L (ref 3.5–5.1)
PROT SERPL-MCNC: 9.3 G/DL (ref 6–8.4)
PROT SERPL-MCNC: 9.4 G/DL (ref 6–8.4)
RBC # BLD AUTO: 4.3 M/UL (ref 4–5.4)
SODIUM SERPL-SCNC: 137 MMOL/L (ref 136–145)
WBC # BLD AUTO: 5.18 K/UL (ref 3.9–12.7)

## 2024-06-24 PROCEDURE — 99232 SBSQ HOSP IP/OBS MODERATE 35: CPT | Mod: ,,, | Performed by: INTERNAL MEDICINE

## 2024-06-24 PROCEDURE — 80053 COMPREHEN METABOLIC PANEL: CPT | Performed by: HOSPITALIST

## 2024-06-24 PROCEDURE — 21400001 HC TELEMETRY ROOM

## 2024-06-24 PROCEDURE — 25000003 PHARM REV CODE 250: Performed by: HOSPITALIST

## 2024-06-24 PROCEDURE — 25000003 PHARM REV CODE 250: Performed by: FAMILY MEDICINE

## 2024-06-24 PROCEDURE — 36415 COLL VENOUS BLD VENIPUNCTURE: CPT | Performed by: HOSPITALIST

## 2024-06-24 PROCEDURE — 85025 COMPLETE CBC W/AUTO DIFF WBC: CPT | Performed by: HOSPITALIST

## 2024-06-24 PROCEDURE — 99223 1ST HOSP IP/OBS HIGH 75: CPT | Mod: ,,, | Performed by: INTERNAL MEDICINE

## 2024-06-24 RX ADMIN — AMLODIPINE BESYLATE 10 MG: 10 TABLET ORAL at 08:06

## 2024-06-24 RX ADMIN — POLYETHYLENE GLYCOL 3350 17 G: 17 POWDER, FOR SOLUTION ORAL at 08:06

## 2024-06-24 RX ADMIN — DOCUSATE SODIUM AND SENNOSIDES 1 TABLET: 8.6; 5 TABLET, FILM COATED ORAL at 08:06

## 2024-06-24 RX ADMIN — SODIUM CHLORIDE: 9 INJECTION, SOLUTION INTRAVENOUS at 04:06

## 2024-06-24 RX ADMIN — SODIUM CHLORIDE: 9 INJECTION, SOLUTION INTRAVENOUS at 08:06

## 2024-06-24 NOTE — ASSESSMENT & PLAN NOTE
Chronic, controlled. Latest blood pressure and vitals reviewed-     Temp:  [98.2 °F (36.8 °C)-99.5 °F (37.5 °C)]   Pulse:  [65-77]   Resp:  [16-18]   BP: (132-142)/(68-80)   SpO2:  [94 %-98 %] .   Home meds for hypertension were reviewed and noted below.   Hypertension Medications               amLODIPine (NORVASC) 5 MG tablet Take 10 mg by mouth.    losartan-hydrochlorothiazide 50-12.5 mg (HYZAAR) 50-12.5 mg per tablet Take 1 tablet by mouth once daily.     While in the hospital, will manage blood pressure as follows; Adjust home antihypertensive regimen as follows- continue amlodipine and hold Hyzaar given SHENA and hypercalcemia    Will utilize p.r.n. blood pressure medication only if patient's blood pressure greater than 180/110 and she develops symptoms such as worsening chest pain or shortness of breath.

## 2024-06-24 NOTE — ASSESSMENT & PLAN NOTE
Body mass index is 32.14 kg/m². Morbid obesity complicates all aspects of disease management from diagnostic modalities to treatment. Weight loss encouraged and health benefits explained to patient.

## 2024-06-24 NOTE — CONSULTS
[facility] - Telemetry ([facility])  Adult Nutrition  Consult Note    SUMMARY     Recommendations  1. Recommend pt continues Diabetic Low Sodium,2gm; 2000 Calorie. 2. Recommend pt receives Boost glucose control BID.   3. Re-weighed for accuracy, weigh daily afterwards.    Goals:   1. Pt will consume 100% of EEN/EPN prior RD follow up.   2. Pt weights will be updated prior to RD follow up.  Nutrition Goal Status: new  Communication of RD Recs: other (comment) (POC: Sticky Notes)    Assessment and Plan    Nutrition Problem  Unintentional weight loss    Related to (etiology):   Inadequate energy intake    Signs and Symptoms (as evidenced by):   Estimated intake of food less than estimated needs    Interventions(treatment strategy):  1. Commercial beverage medical food supplement therapy  2. Collaboration by nutrition professional with other providers.    Nutrition Diagnosis Status:   New       Malnutrition Assessment     Skin (Micronutrient):  (Matheus= 21)       Energy Intake (Malnutrition): less than or equal to 50% for greater than or equal to 5 days       [place] (Muscle Loss): mild depletion                 Reason for Assessment    Reason For Assessment: consult  Diagnosis: renal disease (Hypercalcemia)  Relevant Medical History: Hypercalcemia, Dyslipidemia, T2DM, HTN, Vitamin D deficiency, SHENA  Interdisciplinary Rounds: did not attend  General Information Comments:   6/24/24: 53 y/o female admitted with active principal problem of Hypercalcemia. Dietitian consulted to assess pt d/t decreased appetite and chronic dehydration. Per chart review, pt admitted 6/23 for hypercalcemia. Instructed to come to emergency department due to abnormal labs. Unclear etiology. Referral to nephrology by primary care provider due to abnormal kidney function. States urinary frequency, frothy in character. Associated with confusion, nausea, vomiting, fatigue, reduced appetite. Nephrology consulted. Increase fluids. MD notes note on  "6/24 hypercalcemia persists. Continue intravenous fluids. Nephrology recommending hem/onc consult due to abnormal kappa/lambda light chains. Patient reports mild confusion, kidney stones but denies bone pain or abdominal pain. Visited pt at bedside. Pt states her appetite is improving. Current PO intake of 50-75%. Pt states appetite decreased 2 weeks prior to admission with PO intake <50% d/t altered taste and consistent nausea. Pt states she was only able to tolerate lite fruits and vegetables.Partial NFPE performed, see malnutrition assessment below. Pt states her UBW is 180 lbs and she believes she has lost nearly 20 lbs. Per chart review she has gained +7 lbs within last 1 month. Will recommend pt be re-weighed for accuracy. The pt is currently charted to weigh 181 lb 7 oz, BMI 32.14 (Obese). Pt endorses constipation over the past 1 week, which has improved with bowel regimen. Pt LBM 6/23 x3. Pt denies current n,v,d, chewing and swallowing difficulties. No current wounds noted. Pt currently on room air with SpO2 97%. Dietitian will continue to follow and monitor the pt nutrition status.  Nutrition Discharge Planning: Diabetic, Cardiac.    Nutrition Risk Screen    Nutrition Risk Screen: unintentional loss of 10 lbs or more in the past 2 months (Pt stated wt loss)    Nutrition Related Social Determinants of Health:   SDOH: Adequate food in home environment    Nutrition/Diet History    Spiritual, Cultural Beliefs, Protestant Practices, Values that Affect Care: no  Food Allergies: NKFA  Factors Affecting Nutritional Intake: decreased appetite, altered taste, nausea/vomiting    Anthropometrics    Temp: 98.6 °F (37 °C)  Height Method: Stated  Height: 5' 3" (160 cm)  Height (inches): 63 in  Weight Method: Bed Scale  Weight: 82.3 kg (181 lb 7 oz)  Weight (lb): 181.44 lb  Ideal Body Weight (IBW), Female: 115 lb  % Ideal Body Weight, Female (lb): 157.77 %  BMI (Calculated): 32.1  BMI Grade: 30 - 34.9- obesity - grade I   "   Wt Readings from Last 20 Encounters:   06/24/24 82.3 kg (181 lb 7 oz)   05/31/24 79 kg (174 lb 2.6 oz)   12/30/22 88.7 kg (195 lb 8.8 oz)   02/10/22 90.4 kg (199 lb 4.7 oz)   07/16/21 87.1 kg (192 lb 0.3 oz)   03/26/21 88.6 kg (195 lb 5.2 oz)   11/06/20 89.4 kg (197 lb 1.5 oz)   11/06/20 90.8 kg (200 lb 2.8 oz)   05/01/19 89.4 kg (197 lb 1.5 oz)   05/11/17 95.1 kg (209 lb 10.5 oz)   ]  Lab/Procedures/Meds  BMP  Lab Results   Component Value Date     06/24/2024    K 4.0 06/24/2024     06/24/2024    CO2 19 (L) 06/24/2024    BUN 16 06/24/2024    CREATININE 1.4 06/24/2024    CALCIUM 10.5 06/24/2024    ANIONGAP 12 06/24/2024    EGFRNORACEVR 44 (A) 06/24/2024     Lab Results   Component Value Date    CALCIUM 10.5 06/24/2024    PHOS 3.7 06/22/2024     Recent Labs   Lab 06/24/24  1200   POCTGLUCOSE 93     Lab Results   Component Value Date    ALBUMIN 2.6 (L) 06/24/2024       Pertinent Labs Reviewed: reviewed  Pertinent Medications Reviewed: reviewed  Scheduled Meds:   amLODIPine  10 mg Oral Daily    enoxparin  40 mg Subcutaneous Daily    polyethylene glycol  17 g Oral Daily    senna-docusate 8.6-50 mg  1 tablet Oral BID     Continuous Infusions:   0.9% NaCl   Intravenous Continuous 125 mL/hr at 06/24/24 0838 New Bag at 06/24/24 0838     PRN Meds:.  Current Facility-Administered Medications:     acetaminophen, 650 mg, Rectal, Q6H PRN    acetaminophen, 650 mg, Oral, Q8H PRN    albuterol-ipratropium, 3 mL, Nebulization, Q6H PRN    aluminum-magnesium hydroxide-simethicone, 30 mL, Oral, QID PRN    dextrose 10%, 12.5 g, Intravenous, PRN    dextrose 10%, 25 g, Intravenous, PRN    glucagon (human recombinant), 1 mg, Intramuscular, PRN    glucose, 16 g, Oral, PRN    glucose, 24 g, Oral, PRN    hydrALAZINE, 10 mg, Intravenous, Q6H PRN    insulin aspart U-100, 0-5 Units, Subcutaneous, QID (AC + HS) PRN    melatonin, 6 mg, Oral, Nightly PRN    naloxone, 0.02 mg, Intravenous, PRN    ondansetron, 4 mg, Intravenous, Q8H  PRN    promethazine, 25 mg, Oral, Q6H PRN    senna-docusate 8.6-50 mg, 1 tablet, Oral, BID PRN    sodium chloride 0.9%, 10 mL, Intravenous, Q12H PRN    Estimated/Assessed Needs    Weight Used For Calorie Calculations: 82.3 kg (181 lb 7 oz)  Energy Calorie Requirements (kcal): 4470-4216 (20-30 kcal/kg per SHENA)  Energy Need Method: Kcal/kg  Protein Requirements: 66-82 (0.8-1.0 g/kg per SHENA)  Weight Used For Protein Calculations: 82.3 kg (181 lb 7 oz)  Fluid Requirements (mL): 500 + Total Output     RDA Method (mL): 1646  CHO Requirement: 206-257      Nutrition Prescription Ordered    Current Diet Order: Diabetic Low Sodium,2gm; 2000 Calorie    Evaluation of Received Nutrient/Fluid Intake    % Kcal Needs: 75  % Protein Needs: 75  IV Fluid (mL): 3699.5  I/O: +3497.5  Energy Calories Required: meeting needs  Protein Required: meeting needs  Fluid Required: meeting needs  Tolerance: tolerating  % Intake of Estimated Energy Needs: 50 - 75 %  % Meal Intake: 50 - 75 %    Nutrition Risk    Level of Risk/Frequency of Follow-up: low (x1/week)       Monitor and Evaluation    Food and Nutrient Intake: energy intake, food and beverage intake  Food and Nutrient Adminstration: diet order  Knowledge/Beliefs/Attitudes: beliefs and attitudes  Anthropometric Measurements: weight, weight change, body mass index  Biochemical Data, Medical Tests and Procedures: electrolyte and renal panel, gastrointestinal profile  Nutrition-Focused Physical Findings: overall appearance, extremities, muscles and bones, head and eyes, skin       Nutrition Follow-Up    RD Follow-up?: Yes  Anish Fuller MS, Registration Eligible, Provisional LDN

## 2024-06-24 NOTE — PLAN OF CARE
[facility] - Telemetry (Hospital)  Initial Discharge Assessment       Primary Care Provider: Briana Vu DO    Admission Diagnosis: Hypercalcemia [E83.52]  Hypoalbuminemia [E88.09]  Weakness [R53.1]  Chest pain [R07.9]  Acute renal failure, unspecified acute renal failure type [N17.9]    Admission Date: 6/22/2024  Expected Discharge Date:     Transition of Care Barriers: None    Payor: [employer] / Plan: [facility] CHOICE PLUS / Product Type: Commercial /     Extended Emergency Contact Information  Primary Emergency Contact: Yvrose Davalos  Address: [facility]           [place], [place] [address] [place] [address]  Home Phone: [phone]  Work Phone: [phone]  Relation: Daughter  Secondary Emergency Contact: Rusty Davalos  Address: [address] [place] [address] [address] [place] [address] [facility]  Home Phone: [phone]  Relation: Daughter    Discharge Plan A: Home  Discharge Plan B: Home      [facility] [address] [facility] [place] [facility] [address] [facility]  [address] [facility] [place] [address]  Phone: [phone] Fax: [phone]    [facility] - [facility] [address] [facility]  [address] [address] [facility] [address]  Phone: [phone] Fax: [phone]      Initial Assessment (most recent)       Adult Discharge Assessment - 06/24/24 1333          Discharge Assessment    Assessment Type Discharge Planning Assessment     Confirmed/corrected address, phone number and insurance Yes     Confirmed Demographics Correct on Facesheet     Source of Information patient     Communicated SHER with patient/caregiver Date not available/Unable to determine     Reason For Admission Hypercalcemia     People in Home child(barry), adult     Facility Arrived From: Home     Do you expect to return to your current living situation? Yes     Do you have help at home or someone to help you manage your care at home? Yes   Independent    Who are your caregiver(s) and  their phone number(s)? Daughters     Prior to hospitilization cognitive status: Alert/Oriented     Current cognitive status: Alert/Oriented     Walking or Climbing Stairs Difficulty no     Dressing/Bathing Difficulty no     Equipment Currently Used at Home none     Readmission within 30 days? No     Patient currently being followed by outpatient case management? No     Do you currently have service(s) that help you manage your care at home? No     Do you take prescription medications? Yes     Do you have prescription coverage? Yes     Do you have any problems affording any of your prescribed medications? No     Is the patient taking medications as prescribed? yes     Who is going to help you get home at discharge? Pt drove to hospital     How do you get to doctors appointments? car, drives self     Are you on dialysis? No     Do you take coumadin? No     Discharge Plan A Home     Discharge Plan B Home     DME Needed Upon Discharge  none     Discharge Plan discussed with: Patient     Transition of Care Barriers None                   SW met with patient at bedside to complete discharge assessment. Pt reports living at home and daughterRusty is staying with her. Pt reports independence with with care at home. Pt works and drives. Pt drove to hospital and be transport home. No CM needs expressed/identified.  Pt's whiteboard updated with CM contact and anticipated discharge disposition. SW to remain available as needed.

## 2024-06-24 NOTE — ASSESSMENT & PLAN NOTE
Patient with acute kidney injury/acute renal failure vs CKD. Etiology currently unknown but may be due to  progressive disease versus adverse side effect from medications.  SHENA is currently  undergoing medical management . Baseline creatinine  1.2-1.5  - Labs reviewed- Renal function/electrolytes with Estimated Creatinine Clearance: 45.6 mL/min (based on SCr of 1.4 mg/dL). according to latest data. Monitor urine output and serial BMP and adjust therapy as needed. Avoid nephrotoxins and renally dose meds for GFR listed above.  Patient currently follows Nephrology outpatient.  Nephrology consulted

## 2024-06-24 NOTE — ASSESSMENT & PLAN NOTE
Patient's FSGs are controlled on current medication regimen.  Last A1c reviewed-   Lab Results   Component Value Date    HGBA1C 6.2 (H) 06/03/2024     Most recent fingerstick glucose reviewed-   Recent Labs   Lab 06/23/24 2007 06/24/24 0600 06/24/24  1200   POCTGLUCOSE 100 172* 93       Current correctional scale  Low  Titrate as needed  anti-hyperglycemic dose as follows-   Antihyperglycemics (From admission, onward)    Start     Stop Route Frequency Ordered    06/22/24 2232  insulin aspart U-100 pen 0-5 Units         -- SubQ Before meals & nightly PRN 06/22/24 2132      Plan:  Poor po intake   -SSI  -A1c 6.2  -Accu-checks  -Hold oral hypoglycemics while patient is in the hospital  -Hypoglycemic protocol

## 2024-06-24 NOTE — SUBJECTIVE & OBJECTIVE
Oncology Treatment Plan:   [No matching plan found]    Medications:  Continuous Infusions:   0.9% NaCl   Intravenous Continuous 125 mL/hr at 06/24/24 0838 New Bag at 06/24/24 0838     Scheduled Meds:   amLODIPine  10 mg Oral Daily    enoxparin  40 mg Subcutaneous Daily    polyethylene glycol  17 g Oral Daily    senna-docusate 8.6-50 mg  1 tablet Oral BID     PRN Meds:  Current Facility-Administered Medications:     acetaminophen, 650 mg, Rectal, Q6H PRN    acetaminophen, 650 mg, Oral, Q8H PRN    albuterol-ipratropium, 3 mL, Nebulization, Q6H PRN    aluminum-magnesium hydroxide-simethicone, 30 mL, Oral, QID PRN    dextrose 10%, 12.5 g, Intravenous, PRN    dextrose 10%, 25 g, Intravenous, PRN    glucagon (human recombinant), 1 mg, Intramuscular, PRN    glucose, 16 g, Oral, PRN    glucose, 24 g, Oral, PRN    hydrALAZINE, 10 mg, Intravenous, Q6H PRN    insulin aspart U-100, 0-5 Units, Subcutaneous, QID (AC + HS) PRN    melatonin, 6 mg, Oral, Nightly PRN    naloxone, 0.02 mg, Intravenous, PRN    ondansetron, 4 mg, Intravenous, Q8H PRN    promethazine, 25 mg, Oral, Q6H PRN    senna-docusate 8.6-50 mg, 1 tablet, Oral, BID PRN    sodium chloride 0.9%, 10 mL, Intravenous, Q12H PRN     Review of patient's allergies indicates:   Allergen Reactions    Tylox [oxycodone-acetaminophen]         Past Medical History:   Diagnosis Date    Calculus of kidney 4/9/2014 2:20:53 PM    [facility] Historical - Urology: KidneyStones-No Additional Notes    Complications affecting other specified body systems, hypertension 3/26/2014 1:38:44 PM    [facility] Historical - Cardiovascular: Hypertension-No Additional Notes    Excessive or frequent menstruation 3/20/2014 9:22:15 AM    [facility] - [facility]: Bleeding, Menorrhagia-No Additional Notes    Hypertension     Leiomyoma of uterus 3/20/2014 9:23:21 AM    [facility] - [facility]: Fibroids/Leiomyoma of Uterus-2.1x1.8cm (POST/LT); 3.9x3.5cm (ANT TO ENDO)     Past Surgical  History:   Procedure Laterality Date    ENDOMETRIAL ABLATION      HYSTERECTOMY      fibroids    LITHOTRIPSY       Family History       Problem Relation (Age of Onset)    Diabetes Father          Tobacco Use    Smoking status: Never    Smokeless tobacco: Never   Substance and Sexual Activity    Alcohol use: Yes     Comment: rarely    Drug use: No    Sexual activity: Yes     Partners: Male     Birth control/protection: Post-menopausal       Review of Systems   Constitutional:  Positive for activity change and fatigue. Negative for appetite change, chills, diaphoresis, fever and unexpected weight change.   HENT:  Negative for congestion, dental problem, drooling, ear discharge, ear pain, facial swelling, hearing loss, mouth sores, nosebleeds, postnasal drip, rhinorrhea, sinus pressure, sneezing, sore throat, tinnitus, trouble swallowing and voice change.    Eyes:  Negative for photophobia, pain, discharge, redness, itching and visual disturbance.   Respiratory:  Negative for cough, choking, chest tightness, shortness of breath, wheezing and stridor.    Cardiovascular:  Negative for chest pain, palpitations and leg swelling.   Gastrointestinal:  Negative for abdominal distention, abdominal pain, anal bleeding, blood in stool, constipation, diarrhea, nausea, rectal pain and vomiting.   Endocrine: Negative for cold intolerance, heat intolerance, polydipsia, polyphagia and polyuria.   Genitourinary:  Negative for decreased urine volume, difficulty urinating, dyspareunia, dysuria, enuresis, flank pain, frequency, genital sores, hematuria, menstrual problem, pelvic pain, urgency, vaginal bleeding, vaginal discharge and vaginal pain.   Musculoskeletal:  Negative for arthralgias, back pain, gait problem, joint swelling, myalgias, neck pain and neck stiffness.   Skin:  Negative for color change, pallor and rash.   Allergic/Immunologic: Negative for environmental allergies, food allergies and immunocompromised state.    Neurological:  Positive for weakness. Negative for dizziness, tremors, seizures, syncope, facial asymmetry, speech difficulty, light-headedness, numbness and headaches.   Hematological:  Negative for adenopathy. Does not bruise/bleed easily.   Psychiatric/Behavioral:  Positive for dysphoric mood. Negative for agitation, behavioral problems, confusion, decreased concentration, hallucinations, self-injury, sleep disturbance and suicidal ideas. The patient is nervous/anxious. The patient is not hyperactive.      Objective:     Vital Signs (Most Recent):  Temp: 98.6 °F (37 °C) (06/24/24 1220)  Pulse: 72 (06/24/24 1559)  Resp: 18 (06/24/24 1220)  BP: 130/78 (06/24/24 1220)  SpO2: 97 % (06/24/24 1220) Vital Signs (24h Range):  Temp:  [98.2 °F (36.8 °C)-99.5 °F (37.5 °C)] 98.6 °F (37 °C)  Pulse:  [65-77] 72  Resp:  [16-18] 18  SpO2:  [94 %-98 %] 97 %  BP: (130-142)/(68-80) 130/78     Weight: 82.3 kg (181 lb 7 oz)  Body mass index is 32.14 kg/m².  Body surface area is 1.91 meters squared.      Intake/Output Summary (Last 24 hours) at 6/24/2024 1604  Last data filed at 6/24/2024 1220  Gross per 24 hour   Intake 3699.54 ml   Output 2 ml   Net 3697.54 ml        Physical Exam  Vitals reviewed.   Constitutional:       General: She is not in acute distress.     Appearance: She is well-developed. She is not diaphoretic.   HENT:      Head: Normocephalic and atraumatic.      Right Ear: External ear normal.      Left Ear: External ear normal.      Nose: Nose normal.      Right Sinus: No maxillary sinus tenderness or frontal sinus tenderness.      Left Sinus: No maxillary sinus tenderness or frontal sinus tenderness.      Mouth/Throat:      Pharynx: No oropharyngeal exudate.   Eyes:      General: Lids are normal. No scleral icterus.        Right eye: No discharge.         Left eye: No discharge.      Conjunctiva/sclera: Conjunctivae normal.      Right eye: Right conjunctiva is not injected. No hemorrhage.     Left eye: Left  conjunctiva is not injected. No hemorrhage.     Pupils: Pupils are equal, round, and reactive to light.   Neck:      Thyroid: No thyromegaly.      Vascular: No JVD.      Trachea: No tracheal deviation.   Cardiovascular:      Rate and Rhythm: Normal rate.   Pulmonary:      Effort: Pulmonary effort is normal. No respiratory distress.      Breath sounds: No stridor.   Chest:      Chest wall: No tenderness.   Abdominal:      General: Bowel sounds are normal. There is no distension.      Palpations: Abdomen is soft. There is no hepatomegaly, splenomegaly or mass.      Tenderness: There is no abdominal tenderness. There is no rebound.   Musculoskeletal:         General: No tenderness. Normal range of motion.      Cervical back: Normal range of motion and neck supple.   Lymphadenopathy:      Cervical: No cervical adenopathy.      Upper Body:      Right upper body: No supraclavicular adenopathy.      Left upper body: No supraclavicular adenopathy.   Skin:     General: Skin is dry.      Findings: No erythema or rash.   Neurological:      Mental Status: She is alert and oriented to person, place, and time.      Cranial Nerves: No cranial nerve deficit.      Motor: Weakness present.      Coordination: Coordination normal.      Gait: Gait abnormal.   Psychiatric:         Behavior: Behavior normal.         Thought Content: Thought content normal.         Judgment: Judgment normal.          Significant Labs:   BMP:   Recent Labs   Lab 06/23/24  0434 06/24/24  0440   GLU 80 81    137   K 3.7 4.0    106   CO2 23 19*   BUN 17 16   CREATININE 1.5* 1.4   CALCIUM 11.1* 10.5   , CBC:   Recent Labs   Lab 06/23/24  0434 06/24/24  0440   WBC 5.29 5.18   HGB 12.9 12.5   HCT 40.1 38.5    309   , CMP:   Recent Labs   Lab 06/23/24  0434 06/24/24  0440    137   K 3.7 4.0    106   CO2 23 19*   GLU 80 81   BUN 17 16   CREATININE 1.5* 1.4   CALCIUM 11.1* 10.5   PROT 10.5* 9.4*   ALBUMIN 2.7* 2.6*   BILITOT 0.6 0.6  "  ALKPHOS 92 85   AST 22 23   ALT 16 15   ANIONGAP 10 12   , Coagulation: No results for input(s): "PT", "INR", "APTT" in the last 48 hours., Haptoglobin: No results for input(s): "HAPTOGLOBIN" in the last 48 hours., Immunology:   Recent Labs   Lab 06/23/24  1215   FREELAMBDALI 7.48*   , LDH: No results for input(s): "LDHCSF", "BFSOURCE" in the last 48 hours., LFTs:   Recent Labs   Lab 06/23/24  0434 06/24/24  0440   ALT 16 15   AST 22 23   ALKPHOS 92 85   BILITOT 0.6 0.6   PROT 10.5* 9.4*   ALBUMIN 2.7* 2.6*   , Reticulocytes: No results for input(s): "RETIC" in the last 48 hours., Tumor Markers: No results for input(s): "PSA", "CEA", "", "AFPTM", "[id number]", "" in the last 48 hours.    Invalid input(s): "ALGTM", Uric Acid No results for input(s): "URICACID" in the last 48 hours., and Urine Studies:   Recent Labs   Lab 06/22/24  1918   COLORU Yellow   APPEARANCEUA Hazy*   PHUR 7.0   SPECGRAV 1.010   PROTEINUA 1+*   GLUCUA Negative   KETONESU Negative   BILIRUBINUA Negative   OCCULTUA 2+*   NITRITE Negative   UROBILINOGEN Negative   LEUKOCYTESUR Negative   RBCUA 26*   WBCUA 8*   BACTERIA None   SQUAMEPITHEL 11   HYALINECASTS 4*       Diagnostic Results:  I have reviewed all pertinent imaging results/findings within the past 24 hours.  "

## 2024-06-24 NOTE — PROGRESS NOTES
[facility]  Progress Note    Patient Name: Rosibel Herrera  MRN: [id number]  Patient Class: IP- Inpatient   Admission Date: 6/22/2024  Length of Stay: 1 days  Attending Physician: Tammy Granger MD  Primary Care Provider: Briana Vu DO        Subjective:     Principal Problem:Hypercalcemia        HPI:  Rosibel Herrera is a 54 y.o. female with a PMH  has a past medical history of Calculus of kidney (4/9/2014 2:20:53 PM), Complications affecting other specified body systems, hypertension (3/26/2014 1:38:44 PM), Excessive or frequent menstruation (3/20/2014 9:22:15 AM), Hypertension, and Leiomyoma of uterus (3/20/2014 9:23:21 AM). who presented to the ED directed by primary nephrologist for abnormal lab findings consist of hypercalcemia.  Patient presented with persistent complaints of fatigue, nausea, and constipation x2 weeks duration and was found to have hypercalcemia measuring 12.1 with corrected calcium measuring 13.1.  Patient denied endorsing any other symptoms and reported being in her usual state of health with no other concerns or complaints.  Nephrology was consulted by ED staff to obtain further recommendations and recommended patient be initiated on normal saline at 100 cc/hour with repeat labs in the morning.  Patient admitted to Hospital Medicine under observation for continued medical management.    PCP: Briana Vu    Overview/Hospital Course:  6/23 admitted for hypercalcemia. Instructed to come to emergency department due to labs. Unclear etiology. Referral to nephrology by primary care provider due to abnormal kidney function. States urinary frequency, frothy in character. Associated with confusion, nausea, vomiting, fatigue, reduced appetite. Nephrology consulted. Increase fluids  6/24 hypercalcemia persists. Continue intravenous fluids. Nephrology recommending hem/onc consult due to abnormal kappa/lambda light chains. Patient reports  mild confusion, kidney stones but denies bone pain or abdominal pain.    Interval History: See hospital course for today      Review of Systems   Constitutional:  Negative for activity change, appetite change, fatigue and fever.   Respiratory:  Positive for shortness of breath (intermittent).    Cardiovascular:  Negative for leg swelling.   Gastrointestinal:  Positive for constipation (improved). Negative for abdominal pain, diarrhea, nausea and vomiting.   Genitourinary:  Positive for frequency.   Musculoskeletal:  Negative for arthralgias.   Neurological:  Negative for weakness.   Psychiatric/Behavioral:  Positive for confusion (improved). Negative for agitation, behavioral problems, decreased concentration and dysphoric mood. The patient is not nervous/anxious.      Objective:     Vital Signs (Most Recent):  Temp: 98.2 °F (36.8 °C) (06/24/24 0738)  Pulse: 67 (06/24/24 0843)  Resp: 18 (06/24/24 0738)  BP: (!) 142/68 (06/24/24 0738)  SpO2: (!) 94 % (06/24/24 0738) Vital Signs (24h Range):  Temp:  [98.2 °F (36.8 °C)-99.5 °F (37.5 °C)] 98.2 °F (36.8 °C)  Pulse:  [65-77] 67  Resp:  [16-18] 18  SpO2:  [94 %-98 %] 94 %  BP: (132-142)/(68-80) 142/68     Weight: 82.3 kg (181 lb 7 oz)  Body mass index is 32.14 kg/m².    Intake/Output Summary (Last 24 hours) at 6/24/2024 1443  Last data filed at 6/24/2024 0629  Gross per 24 hour   Intake 3699.54 ml   Output --   Net 3699.54 ml         Physical Exam  Vitals and nursing note reviewed.   Constitutional:       General: She is not in acute distress.     Appearance: She is ill-appearing. She is not toxic-appearing.   HENT:      Head: Normocephalic and atraumatic.   Cardiovascular:      Rate and Rhythm: Normal rate.      Heart sounds: No murmur heard.  Pulmonary:      Effort: Pulmonary effort is normal. No respiratory distress.      Breath sounds: No rales.   Abdominal:      Palpations: Abdomen is soft.      Tenderness: There is no abdominal tenderness.   Musculoskeletal:       Right lower leg: No edema.      Left lower leg: No edema.   Skin:     General: Skin is warm.   Neurological:      Mental Status: She is alert and oriented to person, place, and time.      Motor: No weakness.   Psychiatric:         Mood and Affect: Mood normal.         Behavior: Behavior normal.             Significant Labs: All pertinent labs within the past 24 hours have been reviewed.  CBC:   Recent Labs   Lab 06/23/24  0434 06/24/24  0440   WBC 5.29 5.18   HGB 12.9 12.5   HCT 40.1 38.5    309     CMP:   Recent Labs   Lab 06/23/24  0434 06/24/24  0440    137   K 3.7 4.0    106   CO2 23 19*   GLU 80 81   BUN 17 16   CREATININE 1.5* 1.4   CALCIUM 11.1* 10.5   PROT 10.5* 9.4*   ALBUMIN 2.7* 2.6*   BILITOT 0.6 0.6   ALKPHOS 92 85   AST 22 23   ALT 16 15   ANIONGAP 10 12     Pth within normal limits      Protein, Serum 6.0 - 8.4 g/dL 9.3 High    Comment: Serum protein electrophoresis and immunofixation results should be  interpreted in clinical context in that some therapeutic agents can  result  in false positive results (example, daratumumab). Correlation with  the  patient s therapeutic regimen is required.   Albumin 3.35 - 5.55 g/dL 2.94 Low    Alpha-1 0.17 - 0.41 g/dL 0.33   Alpha-2 0.43 - 0.99 g/dL 0.77   Beta 0.50 - 1.10 g/dL 1.25 High    Gamma 0.67 - 1.58 g/dL 4.01 High        Significant Imaging: I have reviewed all pertinent imaging results/findings within the past 24 hours.  U/S: I have reviewed all pertinent results/findings within the past 24 hours and my personal findings are:  bilateral nonobstructing stones    Assessment/Plan:      * Hypercalcemia  The patient has hypercalcemia that is currently uncontrolled. The patient has the following symptoms due to their hypercalcemia: weakness, fatigue, and constipation. The hypercalcemia is likely due to  unknown etiology currently but is noted to be on HCTZ as well as Vit D outpatient . We will obtain the following labs to work up the  "hypercalcemia:  currently underway . We will treat the hypercalcemia with: IV fluids ordered at a rate of 100 ml/hr.  Patient also started on stool softeners.  Home Hyzaar and vitamin-D currently on hold.  Patient needing PTH and vitamin-D level.  Their latest calcium has been reviewed and is listed below.  No results found for: "CAION"  Minimal improvement with fluids  Increase rate of fluids  Nephrology consulted   Abnormal serum protein and free light chain ratio  Pth within normal limits   Hem/onc consulted     SHENA (acute kidney injury)  Patient with acute kidney injury/acute renal failure vs CKD. Etiology currently unknown but may be due to  progressive disease versus adverse side effect from medications.  SHENA is currently  undergoing medical management . Baseline creatinine  1.2-1.5  - Labs reviewed- Renal function/electrolytes with Estimated Creatinine Clearance: 45.6 mL/min (based on SCr of 1.4 mg/dL). according to latest data. Monitor urine output and serial BMP and adjust therapy as needed. Avoid nephrotoxins and renally dose meds for GFR listed above.  Patient currently follows Nephrology outpatient.  Nephrology consulted     Class 1 obesity due to excess calories with body mass index (BMI) of 30.0 to 30.9 in adult  Body mass index is 32.14 kg/m². Morbid obesity complicates all aspects of disease management from diagnostic modalities to treatment. Weight loss encouraged and health benefits explained to patient.       Hypertension  Chronic, controlled. Latest blood pressure and vitals reviewed-     Temp:  [98.2 °F (36.8 °C)-99.5 °F (37.5 °C)]   Pulse:  [65-77]   Resp:  [16-18]   BP: (132-142)/(68-80)   SpO2:  [94 %-98 %] .   Home meds for hypertension were reviewed and noted below.   Hypertension Medications               amLODIPine (NORVASC) 5 MG tablet Take 10 mg by mouth.    losartan-hydrochlorothiazide 50-12.5 mg (HYZAAR) 50-12.5 mg per tablet Take 1 tablet by mouth once daily.     While in the " hospital, will manage blood pressure as follows; Adjust home antihypertensive regimen as follows- continue amlodipine and hold Hyzaar given SHENA and hypercalcemia    Will utilize p.r.n. blood pressure medication only if patient's blood pressure greater than 180/110 and she develops symptoms such as worsening chest pain or shortness of breath.      Vitamin D deficiency  Patient currently on outpatient supplement replacement therapy currently on hold due to hypercalcemia as noted above.  Plan:  -hold home medications  -f/u with PCP/Nephrology to resume following hospital discharge      Type 2 diabetes mellitus  Patient's FSGs are controlled on current medication regimen.  Last A1c reviewed-   Lab Results   Component Value Date    HGBA1C 6.2 (H) 06/03/2024     Most recent fingerstick glucose reviewed-   Recent Labs   Lab 06/23/24 2007 06/24/24  0600 06/24/24  1200   POCTGLUCOSE 100 172* 93       Current correctional scale  Low  Titrate as needed  anti-hyperglycemic dose as follows-   Antihyperglycemics (From admission, onward)      Start     Stop Route Frequency Ordered    06/22/24 2232  insulin aspart U-100 pen 0-5 Units         -- SubQ Before meals & nightly PRN 06/22/24 2132        Plan:  Poor po intake   -SSI  -A1c 6.2  -Accu-checks  -Hold oral hypoglycemics while patient is in the hospital  -Hypoglycemic protocol          Dyslipidemia  Patient is not currently on statin.will recommend patient begin statin at time of hospital discharge. Last Lipid Panel:   Lab Results   Component Value Date    CHOL 204 (H) 06/03/2024    HDL 33 (L) 06/03/2024    LDLCALC 133.4 06/03/2024    TRIG 188 (H) 06/03/2024    CHOLHDL 16.2 (L) 06/03/2024   Plan:  -low fat/low calorie diet  The 10-year ASCVD risk score (Otilio HUTCHINS, et al., 2019) is: 25.8%    Values used to calculate the score:      Age: 54 years      Sex: Female      Is Non- : Yes      Diabetic: Yes      Tobacco smoker: No      Systolic Blood Pressure:  142 mmHg      Is BP treated: Yes      HDL Cholesterol: 33 mg/dL      Total Cholesterol: 204 mg/dL  Would benefit from statin  Defer to primary care provider         VTE Risk Mitigation (From admission, onward)           Ordered     enoxaparin injection 40 mg  Daily         06/22/24 2132     IP VTE HIGH RISK PATIENT  Once         06/22/24 2132     Place sequential compression device  Until discontinued         06/22/24 2132                    Discharge Planning   SHER:      Code Status: Full Code   Is the patient medically ready for discharge?:     Reason for patient still in hospital (select all that apply): Patient new problem, Patient trending condition, Laboratory test, Treatment, and Consult recommendations  Discharge Plan A: Home                  Tammy Granger MD  Department of Hospital Medicine   [facility] - Telemetry ([facility])

## 2024-06-24 NOTE — ASSESSMENT & PLAN NOTE
"The patient has hypercalcemia that is currently uncontrolled. The patient has the following symptoms due to their hypercalcemia: weakness, fatigue, and constipation. The hypercalcemia is likely due to  unknown etiology currently but is noted to be on HCTZ as well as Vit D outpatient . We will obtain the following labs to work up the hypercalcemia:  currently underway . We will treat the hypercalcemia with: IV fluids ordered at a rate of 100 ml/hr.  Patient also started on stool softeners.  Home Hyzaar and vitamin-D currently on hold.  Patient needing PTH and vitamin-D level.  Their latest calcium has been reviewed and is listed below.  No results found for: "CAION"  Minimal improvement with fluids  Increase rate of fluids  Nephrology consulted   Abnormal serum protein and free light chain ratio  Pth within normal limits   Hem/onc consulted   "

## 2024-06-24 NOTE — SUBJECTIVE & OBJECTIVE
Interval History: See hospital course for today      Review of Systems   Constitutional:  Negative for activity change, appetite change, fatigue and fever.   Respiratory:  Positive for shortness of breath (intermittent).    Cardiovascular:  Negative for leg swelling.   Gastrointestinal:  Positive for constipation (improved). Negative for abdominal pain, diarrhea, nausea and vomiting.   Genitourinary:  Positive for frequency.   Musculoskeletal:  Negative for arthralgias.   Neurological:  Negative for weakness.   Psychiatric/Behavioral:  Positive for confusion (improved). Negative for agitation, behavioral problems, decreased concentration and dysphoric mood. The patient is not nervous/anxious.      Objective:     Vital Signs (Most Recent):  Temp: 98.2 °F (36.8 °C) (06/24/24 0738)  Pulse: 67 (06/24/24 0843)  Resp: 18 (06/24/24 0738)  BP: (!) 142/68 (06/24/24 0738)  SpO2: (!) 94 % (06/24/24 0738) Vital Signs (24h Range):  Temp:  [98.2 °F (36.8 °C)-99.5 °F (37.5 °C)] 98.2 °F (36.8 °C)  Pulse:  [65-77] 67  Resp:  [16-18] 18  SpO2:  [94 %-98 %] 94 %  BP: (132-142)/(68-80) 142/68     Weight: 82.3 kg (181 lb 7 oz)  Body mass index is 32.14 kg/m².    Intake/Output Summary (Last 24 hours) at 6/24/2024 1443  Last data filed at 6/24/2024 0629  Gross per 24 hour   Intake 3699.54 ml   Output --   Net 3699.54 ml         Physical Exam  Vitals and nursing note reviewed.   Constitutional:       General: She is not in acute distress.     Appearance: She is ill-appearing. She is not toxic-appearing.   HENT:      Head: Normocephalic and atraumatic.   Cardiovascular:      Rate and Rhythm: Normal rate.      Heart sounds: No murmur heard.  Pulmonary:      Effort: Pulmonary effort is normal. No respiratory distress.      Breath sounds: No rales.   Abdominal:      Palpations: Abdomen is soft.      Tenderness: There is no abdominal tenderness.   Musculoskeletal:      Right lower leg: No edema.      Left lower leg: No edema.   Skin:      General: Skin is warm.   Neurological:      Mental Status: She is alert and oriented to person, place, and time.      Motor: No weakness.   Psychiatric:         Mood and Affect: Mood normal.         Behavior: Behavior normal.             Significant Labs: All pertinent labs within the past 24 hours have been reviewed.  CBC:   Recent Labs   Lab 06/23/24  0434 06/24/24  0440   WBC 5.29 5.18   HGB 12.9 12.5   HCT 40.1 38.5    309     CMP:   Recent Labs   Lab 06/23/24  0434 06/24/24  0440    137   K 3.7 4.0    106   CO2 23 19*   GLU 80 81   BUN 17 16   CREATININE 1.5* 1.4   CALCIUM 11.1* 10.5   PROT 10.5* 9.4*   ALBUMIN 2.7* 2.6*   BILITOT 0.6 0.6   ALKPHOS 92 85   AST 22 23   ALT 16 15   ANIONGAP 10 12     Pth within normal limits      Protein, Serum 6.0 - 8.4 g/dL 9.3 High    Comment: Serum protein electrophoresis and immunofixation results should be  interpreted in clinical context in that some therapeutic agents can  result  in false positive results (example, daratumumab). Correlation with  the  patient s therapeutic regimen is required.   Albumin 3.35 - 5.55 g/dL 2.94 Low    Alpha-1 0.17 - 0.41 g/dL 0.33   Alpha-2 0.43 - 0.99 g/dL 0.77   Beta 0.50 - 1.10 g/dL 1.25 High    Gamma 0.67 - 1.58 g/dL 4.01 High        Significant Imaging: I have reviewed all pertinent imaging results/findings within the past 24 hours.  U/S: I have reviewed all pertinent results/findings within the past 24 hours and my personal findings are:  bilateral nonobstructing stones

## 2024-06-24 NOTE — CONSULTS
[facility] - Telemetry ([facility])  Hematology/Oncology  Consult Note    Patient Name: Rosibel Herrera  MRN: [id number]  Admission Date: 6/22/2024  Hospital Length of Stay: 1 days  Code Status: Full Code   Attending Provider: Tammy Granger MD  Consulting Provider: Marco Alford MD  Primary Care Physician: Briana Vu DO  Principal Problem:Hypercalcemia    Consults  Subjective:     HPI:  54-year-old female admitted to the hospital with hypercalcemia.  I was asked to see the patient after elevated free light chain ratio in increased gammaglobulin level would noted.  For possible multiple myeloma.  ECOG status 1    Oncology Treatment Plan:   [No matching plan found]    Medications:  Continuous Infusions:   0.9% NaCl   Intravenous Continuous 125 mL/hr at 06/24/24 0838 New Bag at 06/24/24 0838     Scheduled Meds:   amLODIPine  10 mg Oral Daily    enoxparin  40 mg Subcutaneous Daily    polyethylene glycol  17 g Oral Daily    senna-docusate 8.6-50 mg  1 tablet Oral BID     PRN Meds:  Current Facility-Administered Medications:     acetaminophen, 650 mg, Rectal, Q6H PRN    acetaminophen, 650 mg, Oral, Q8H PRN    albuterol-ipratropium, 3 mL, Nebulization, Q6H PRN    aluminum-magnesium hydroxide-simethicone, 30 mL, Oral, QID PRN    dextrose 10%, 12.5 g, Intravenous, PRN    dextrose 10%, 25 g, Intravenous, PRN    glucagon (human recombinant), 1 mg, Intramuscular, PRN    glucose, 16 g, Oral, PRN    glucose, 24 g, Oral, PRN    hydrALAZINE, 10 mg, Intravenous, Q6H PRN    insulin aspart U-100, 0-5 Units, Subcutaneous, QID (AC + HS) PRN    melatonin, 6 mg, Oral, Nightly PRN    naloxone, 0.02 mg, Intravenous, PRN    ondansetron, 4 mg, Intravenous, Q8H PRN    promethazine, 25 mg, Oral, Q6H PRN    senna-docusate 8.6-50 mg, 1 tablet, Oral, BID PRN    sodium chloride 0.9%, 10 mL, Intravenous, Q12H PRN     Review of patient's allergies indicates:   Allergen Reactions    Tylox [oxycodone-acetaminophen]         Past Medical  History:   Diagnosis Date    Calculus of kidney 4/9/2014 2:20:53 PM    [facility] - Urology: KidneyStones-No Additional Notes    Complications affecting other specified body systems, hypertension 3/26/2014 1:38:44 PM    [facility] - Cardiovascular: Hypertension-No Additional Notes    Excessive or frequent menstruation 3/20/2014 9:22:15 AM    [facility]: Bleeding, Menorrhagia-No Additional Notes    Hypertension     Leiomyoma of uterus 3/20/2014 9:23:21 AM    [facility] - [facility]: Fibroids/Leiomyoma of Uterus-2.1x1.8cm (POST/LT); 3.9x3.5cm (ANT TO ENDO)     Past Surgical History:   Procedure Laterality Date    ENDOMETRIAL ABLATION      HYSTERECTOMY      fibroids    LITHOTRIPSY       Family History       Problem Relation (Age of Onset)    Diabetes Father          Tobacco Use    Smoking status: Never    Smokeless tobacco: Never   Substance and Sexual Activity    Alcohol use: Yes     Comment: rarely    Drug use: No    Sexual activity: Yes     Partners: Male     Birth control/protection: Post-menopausal       Review of Systems   Constitutional:  Positive for activity change and fatigue. Negative for appetite change, chills, diaphoresis, fever and unexpected weight change.   HENT:  Negative for congestion, dental problem, drooling, ear discharge, ear pain, facial swelling, hearing loss, mouth sores, nosebleeds, postnasal drip, rhinorrhea, sinus pressure, sneezing, sore throat, tinnitus, trouble swallowing and voice change.    Eyes:  Negative for photophobia, pain, discharge, redness, itching and visual disturbance.   Respiratory:  Negative for cough, choking, chest tightness, shortness of breath, wheezing and stridor.    Cardiovascular:  Negative for chest pain, palpitations and leg swelling.   Gastrointestinal:  Negative for abdominal distention, abdominal pain, anal bleeding, blood in stool, constipation, diarrhea, nausea, rectal pain and vomiting.   Endocrine:  Negative for cold intolerance, heat intolerance, polydipsia, polyphagia and polyuria.   Genitourinary:  Negative for decreased urine volume, difficulty urinating, dyspareunia, dysuria, enuresis, flank pain, frequency, genital sores, hematuria, menstrual problem, pelvic pain, urgency, vaginal bleeding, vaginal discharge and vaginal pain.   Musculoskeletal:  Negative for arthralgias, back pain, gait problem, joint swelling, myalgias, neck pain and neck stiffness.   Skin:  Negative for color change, pallor and rash.   Allergic/Immunologic: Negative for environmental allergies, food allergies and immunocompromised state.   Neurological:  Positive for weakness. Negative for dizziness, tremors, seizures, syncope, facial asymmetry, speech difficulty, light-headedness, numbness and headaches.   Hematological:  Negative for adenopathy. Does not bruise/bleed easily.   Psychiatric/Behavioral:  Positive for dysphoric mood. Negative for agitation, behavioral problems, confusion, decreased concentration, hallucinations, self-injury, sleep disturbance and suicidal ideas. The patient is nervous/anxious. The patient is not hyperactive.      Objective:     Vital Signs (Most Recent):  Temp: 98.6 °F (37 °C) (06/24/24 1220)  Pulse: 72 (06/24/24 1559)  Resp: 18 (06/24/24 1220)  BP: 130/78 (06/24/24 1220)  SpO2: 97 % (06/24/24 1220) Vital Signs (24h Range):  Temp:  [98.2 °F (36.8 °C)-99.5 °F (37.5 °C)] 98.6 °F (37 °C)  Pulse:  [65-77] 72  Resp:  [16-18] 18  SpO2:  [94 %-98 %] 97 %  BP: (130-142)/(68-80) 130/78     Weight: 82.3 kg (181 lb 7 oz)  Body mass index is 32.14 kg/m².  Body surface area is 1.91 meters squared.      Intake/Output Summary (Last 24 hours) at 6/24/2024 1604  Last data filed at 6/24/2024 1220  Gross per 24 hour   Intake 3699.54 ml   Output 2 ml   Net 3697.54 ml        Physical Exam  Vitals reviewed.   Constitutional:       General: She is not in acute distress.     Appearance: She is well-developed. She is not  diaphoretic.   HENT:      Head: Normocephalic and atraumatic.      Right Ear: External ear normal.      Left Ear: External ear normal.      Nose: Nose normal.      Right Sinus: No maxillary sinus tenderness or frontal sinus tenderness.      Left Sinus: No maxillary sinus tenderness or frontal sinus tenderness.      Mouth/Throat:      Pharynx: No oropharyngeal exudate.   Eyes:      General: Lids are normal. No scleral icterus.        Right eye: No discharge.         Left eye: No discharge.      Conjunctiva/sclera: Conjunctivae normal.      Right eye: Right conjunctiva is not injected. No hemorrhage.     Left eye: Left conjunctiva is not injected. No hemorrhage.     Pupils: Pupils are equal, round, and reactive to light.   Neck:      Thyroid: No thyromegaly.      Vascular: No JVD.      Trachea: No tracheal deviation.   Cardiovascular:      Rate and Rhythm: Normal rate.   Pulmonary:      Effort: Pulmonary effort is normal. No respiratory distress.      Breath sounds: No stridor.   Chest:      Chest wall: No tenderness.   Abdominal:      General: Bowel sounds are normal. There is no distension.      Palpations: Abdomen is soft. There is no hepatomegaly, splenomegaly or mass.      Tenderness: There is no abdominal tenderness. There is no rebound.   Musculoskeletal:         General: No tenderness. Normal range of motion.      Cervical back: Normal range of motion and neck supple.   Lymphadenopathy:      Cervical: No cervical adenopathy.      Upper Body:      Right upper body: No supraclavicular adenopathy.      Left upper body: No supraclavicular adenopathy.   Skin:     General: Skin is dry.      Findings: No erythema or rash.   Neurological:      Mental Status: She is alert and oriented to person, place, and time.      Cranial Nerves: No cranial nerve deficit.      Motor: Weakness present.      Coordination: Coordination normal.      Gait: Gait abnormal.   Psychiatric:         Behavior: Behavior normal.         Thought  "Content: Thought content normal.         Judgment: Judgment normal.          Significant Labs:   BMP:   Recent Labs   Lab 06/23/24  0434 06/24/24  0440   GLU 80 81    137   K 3.7 4.0    106   CO2 23 19*   BUN 17 16   CREATININE 1.5* 1.4   CALCIUM 11.1* 10.5   , CBC:   Recent Labs   Lab 06/23/24  0434 06/24/24  0440   WBC 5.29 5.18   HGB 12.9 12.5   HCT 40.1 38.5    309   , CMP:   Recent Labs   Lab 06/23/24  0434 06/24/24  0440    137   K 3.7 4.0    106   CO2 23 19*   GLU 80 81   BUN 17 16   CREATININE 1.5* 1.4   CALCIUM 11.1* 10.5   PROT 10.5* 9.4*   ALBUMIN 2.7* 2.6*   BILITOT 0.6 0.6   ALKPHOS 92 85   AST 22 23   ALT 16 15   ANIONGAP 10 12   , Coagulation: No results for input(s): "PT", "INR", "APTT" in the last 48 hours., Haptoglobin: No results for input(s): "HAPTOGLOBIN" in the last 48 hours., Immunology:   Recent Labs   Lab 06/23/24  1215   FREELAMBDALI 7.48*   , LDH: No results for input(s): "LDHCSF", "BFSOURCE" in the last 48 hours., LFTs:   Recent Labs   Lab 06/23/24  0434 06/24/24  0440   ALT 16 15   AST 22 23   ALKPHOS 92 85   BILITOT 0.6 0.6   PROT 10.5* 9.4*   ALBUMIN 2.7* 2.6*   , Reticulocytes: No results for input(s): "RETIC" in the last 48 hours., Tumor Markers: No results for input(s): "PSA", "CEA", "", "AFPTM", "AM8496", "" in the last 48 hours.    Invalid input(s): "ALGTM", Uric Acid No results for input(s): "URICACID" in the last 48 hours., and Urine Studies:   Recent Labs   Lab 06/22/24  1918   COLORU Yellow   APPEARANCEUA Hazy*   PHUR 7.0   SPECGRAV 1.010   PROTEINUA 1+*   GLUCUA Negative   KETONESU Negative   BILIRUBINUA Negative   OCCULTUA 2+*   NITRITE Negative   UROBILINOGEN Negative   LEUKOCYTESUR Negative   RBCUA 26*   WBCUA 8*   BACTERIA None   SQUAMEPITHEL 11   HYALINECASTS 4*       Diagnostic Results:  I have reviewed all pertinent imaging results/findings within the past 24 hours.  Assessment/Plan:     * Hypercalcemia  I have reviewed records " patient has elevated free light chain ratio in elevated gammaglobulin awaiting for interpretation by pathologist serum protein electrophoresis I am very concerned that this patient most likely has multiple myeloma.  I have talked to the patient about the diagnosis I would recommend that the patient have a bone marrow biopsy and a PET scan be done as outpatient.  Diagnosis for the next several weeks.  Will ask our navigation team coordinate patient is discharged in follow-up patient history of multiple she is years ago talked to her fact that this is dramatically different at this time.  Assuming diagnosis is correct    SHENA (acute kidney injury)  Cared for by primary care team    Type 2 diabetes mellitus  Cared for by primary care team    Dyslipidemia  Cared for by primary care team        Thank you for your consult. I will follow-up with patient. Please contact us if you have any additional questions.    Marco Alford MD  Hematology/Oncology  [facility] - Telemetry ([facility])

## 2024-06-24 NOTE — ASSESSMENT & PLAN NOTE
I have reviewed records patient has elevated free light chain ratio in elevated gammaglobulin awaiting for interpretation by pathologist serum protein electrophoresis I am very concerned that this patient most likely has multiple myeloma.  I have talked to the patient about the diagnosis I would recommend that the patient have a bone marrow biopsy and a PET scan be done as outpatient.  Diagnosis for the next several weeks.  Will ask our navigation team coordinate patient is discharged in follow-up patient history of multiple she is years ago talked to her fact that this is dramatically different at this time.  Assuming diagnosis is correct

## 2024-06-24 NOTE — PROGRESS NOTES
Nephrology Progress Note:    HPI:   Rosibel Herrera is a 54 y.o. female with a hx of hypertension, remote history of kidney stones, metabolic syndrome, patient was admitted hospital yesterday for further evaluation of persistent hypercalcemia.  She had lab work on 06/03/2024 that showed a serum calcium of 12.1, creatinine 1.5 mg/dL.  She had an appointment with outpatient Nephrology for further evaluation, repeat labs yesterday showed a creatinine up again 12.1, patient was advised to come to the hospital for further evaluation and management.  She was started on IV fluids yesterday with slight improvement in serum creatinine to 11 today.  Patient takes vitamin-D supplements but denied recent significant calcium intake.     Due to her hypercalcemia, Nephrology was consulted on 06/23/2024.    Interval History:   Chart reviewed/patient examined.  She reports she is feeling better since her calcium has improved.  She denies nausea, vomiting, or shortness of breath.  She denies tingling muscle aches, or numbness.  She is tolerating her diet.  No family is present.    Health Status   Allergies:    is allergic to tylox [oxycodone-acetaminophen].    Current medications:     Current Facility-Administered Medications:     0.9%  NaCl infusion, , Intravenous, Continuous, Tammy Granger MD, Last Rate: 125 mL/hr at 06/24/24 0838, New Bag at 06/24/24 0838    acetaminophen suppository 650 mg, 650 mg, Rectal, Q6H PRN, Fransisco Mora MD    acetaminophen tablet 650 mg, 650 mg, Oral, Q8H PRN, Fransisco Mora MD    albuterol-ipratropium 2.5 mg-0.5 mg/3 mL nebulizer solution 3 mL, 3 mL, Nebulization, Q6H PRN, Fransisco Mora MD    aluminum-magnesium hydroxide-simethicone 200-200-20 mg/5 mL suspension 30 mL, 30 mL, Oral, QID PRN, Fransisco Mora MD    amLODIPine tablet 10 mg, 10 mg, Oral, Daily, Fransisco Mora MD, 10 mg at 06/24/24 0838    dextrose 10% bolus 125 mL 125 mL, 12.5 g, Intravenous, PRN, Morgan  "Fransisco OMALLEY MD    dextrose 10% bolus 250 mL 250 mL, 25 g, Intravenous, PRN, Fransisco Mora MD    enoxaparin injection 40 mg, 40 mg, Subcutaneous, Daily, Fransisco Mora MD    glucagon (human recombinant) injection 1 mg, 1 mg, Intramuscular, PRN, Fransisco Mora MD    glucose chewable tablet 16 g, 16 g, Oral, PRN, Fransisco Mora MD    glucose chewable tablet 24 g, 24 g, Oral, PRN, Fransisco Mora MD    hydrALAZINE injection 10 mg, 10 mg, Intravenous, Q6H PRN, Tammy Granger MD    insulin aspart U-100 pen 0-5 Units, 0-5 Units, Subcutaneous, QID (AC + HS) PRN, Fransisco Mora MD    melatonin tablet 6 mg, 6 mg, Oral, Nightly PRN, Fransisco Mora MD    naloxone 0.4 mg/mL injection 0.02 mg, 0.02 mg, Intravenous, PRN, Fransisco Mora MD    ondansetron injection 4 mg, 4 mg, Intravenous, Q8H PRN, Fransisco Mora MD    polyethylene glycol packet 17 g, 17 g, Oral, Daily, Tammy Granger MD, 17 g at 06/24/24 0839    promethazine tablet 25 mg, 25 mg, Oral, Q6H PRN, Fransisco Mora MD    senna-docusate 8.6-50 mg per tablet 1 tablet, 1 tablet, Oral, BID PRN, Fransisco Mora MD, 1 tablet at 06/23/24 0920    senna-docusate 8.6-50 mg per tablet 1 tablet, 1 tablet, Oral, BID, Tammy Granger MD, 1 tablet at 06/24/24 0838    sodium chloride 0.9% flush 10 mL, 10 mL, Intravenous, Q12H PRN, Fransisco Mora MD         Objective       Physical Examination:   VS/Measurements   BP (!) 142/68 (BP Location: Right arm, Patient Position: Lying)   Pulse 67   Temp 98.2 °F (36.8 °C) (Oral)   Resp 18   Ht 5' 3" (1.6 m)   Wt 82.3 kg (181 lb 7 oz)   SpO2 (!) 94%   BMI 32.14 kg/m²   Vitals:    06/24/24 0843   BP:    Pulse: 67   Resp:    Temp:        UOP = not quantitated  Constitutional:       General: She is not in acute distress.     Appearance: She is not toxic-appearing.   HENT:      Head: Normocephalic and atraumatic.      Nose: No congestion or rhinorrhea.   Eyes:      Extraocular " Movements: Extraocular movements intact.      Conjunctiva/sclera: Conjunctivae normal.   Cardiovascular:      Rate and Rhythm: Normal rate and regular rhythm.      Heart sounds: No murmur heard.     No friction rub.   Pulmonary:      Effort: Pulmonary effort is normal. No respiratory distress.      Breath sounds: Normal breath sounds. No stridor.   Abdominal:      General: Abdomen is flat. Bowel sounds are normal. There is no distension.      Palpations: Abdomen is soft. There is no mass.   Musculoskeletal:         General: No swelling or tenderness.      Cervical back: Normal range of motion. No rigidity or tenderness.   Skin:     Coloration: Skin is not jaundiced or pale.   Neurological:      Mental Status: She is alert.      Cranial Nerves: No cranial nerve deficit.   Vitals reviewed.    Laboratory Results   Today's Lab Results :    Recent Results (from the past 24 hour(s))   Calcium, urine, random    Collection Time: 06/23/24  2:01 PM   Result Value Ref Range    Calcium, Urine 21.9 (H) 0.0 - 15.0 mg/dL   Creatinine, urine, random    Collection Time: 06/23/24  2:01 PM   Result Value Ref Range    Creatinine, Urine 41.9 15.0 - 325.0 mg/dL   POCT glucose    Collection Time: 06/23/24  8:07 PM   Result Value Ref Range    POCT Glucose 100 70 - 110 mg/dL   Comprehensive Metabolic Panel (CMP)    Collection Time: 06/24/24  4:40 AM   Result Value Ref Range    Sodium 137 136 - 145 mmol/L    Potassium 4.0 3.5 - 5.1 mmol/L    Chloride 106 95 - 110 mmol/L    CO2 19 (L) 23 - 29 mmol/L    Glucose 81 70 - 110 mg/dL    BUN 16 6 - 20 mg/dL    Creatinine 1.4 0.5 - 1.4 mg/dL    Calcium 10.5 8.7 - 10.5 mg/dL    Total Protein 9.4 (H) 6.0 - 8.4 g/dL    Albumin 2.6 (L) 3.5 - 5.2 g/dL    Total Bilirubin 0.6 0.1 - 1.0 mg/dL    Alkaline Phosphatase 85 55 - 135 U/L    AST 23 10 - 40 U/L    ALT 15 10 - 44 U/L    eGFR 44 (A) >60 mL/min/1.73 m^2    Anion Gap 12 8 - 16 mmol/L   CBC with Automated Differential    Collection Time: 06/24/24  4:40  AM   Result Value Ref Range    WBC 5.18 3.90 - 12.70 K/uL    RBC 4.30 4.00 - 5.40 M/uL    Hemoglobin 12.5 12.0 - 16.0 g/dL    Hematocrit 38.5 37.0 - 48.5 %    MCV 90 82 - 98 fL    MCH 29.1 27.0 - 31.0 pg    MCHC 32.5 32.0 - 36.0 g/dL    RDW 13.7 11.5 - 14.5 %    Platelets 309 150 - 450 K/uL    MPV 10.1 9.2 - 12.9 fL    Immature Granulocytes 0.2 0.0 - 0.5 %    Gran # (ANC) 3.0 1.8 - 7.7 K/uL    Immature Grans (Abs) 0.01 0.00 - 0.04 K/uL    Lymph # 1.1 1.0 - 4.8 K/uL    Mono # 0.7 0.3 - 1.0 K/uL    Eos # 0.4 0.0 - 0.5 K/uL    Baso # 0.05 0.00 - 0.20 K/uL    nRBC 0 0 /100 WBC    Gran % 57.7 38.0 - 73.0 %    Lymph % 20.3 18.0 - 48.0 %    Mono % 13.7 4.0 - 15.0 %    Eosinophil % 7.1 0.0 - 8.0 %    Basophil % 1.0 0.0 - 1.9 %    Differential Method Automated    POCT glucose    Collection Time: 06/24/24  6:00 AM   Result Value Ref Range    POCT Glucose 172 (H) 70 - 110 mg/dL   POCT glucose    Collection Time: 06/24/24 12:00 PM   Result Value Ref Range    POCT Glucose 93 70 - 110 mg/dL     US Retroperitoneal Complete [[id number]] - 06/24/2024 Resulted: 06/24/24 0854   Order Status: Completed Updated: 06/24/24 0856   Narrative:     EXAMINATION: US RETROPERITONEAL COMPLETE  CLINICAL HISTORY: SHENA;  TECHNIQUE:Ultrasound of the kidneys and urinary bladder was performed including color flow and Doppler evaluation of the kidneys.  COMPARISON: None.    FINDINGS:  Right kidney: The right kidney measures 12.7 cm. Mild cortical thinning. No loss of corticomedullary distinction.  Normal perfusion.   No mass. 5 mm midpole stone.  8 mm lower pole stone.  No hydronephrosis.  Left kidney: The left kidney measures 13 cm. Mild cortical thinning. No loss of corticomedullary distinction.  Normal perfusion. No mass. 1 cm stone within the lower pole left kidney.  No hydronephrosis.  The bladder is partially distended at the time of scanning and has an unremarkable appearance.   Impression:     Bilateral nonobstructing stones.         @[facility]@  @[facility]@    Assessment & Plan   Active Hospital Problems    Diagnosis  POA    *Hypercalcemia [E83.52]  Yes    Hypertension [I10]  Yes    Class 1 obesity due to excess calories with body mass index (BMI) of 30.0 to 30.9 in adult [E66.09, Z68.30]  Not Applicable    SHENA (acute kidney injury) [N17.9]  Yes    Vitamin D deficiency [E55.9]  Yes    Dyslipidemia [E78.5]  Yes    Type 2 diabetes mellitus [E11.9]  Yes      Resolved Hospital Problems   No resolved problems to display.       Assessment/Recommendations:      Hypercalcemia.  Resolved  - etiology unclear but differential diagnosis includes primary hyperparathyroidism, FHH,   - abnormal SPEP kappa/lambda light chains noted.  Would consult Hematology/Oncology  - further workup depends upon initial lab results.  - continue IV fluids and follow laboratory studies    Nephrolithiasis.  Bilateral nonobstructing per ultrasound     SHENA / SHENA on CKD 3B  -  serum creatinine has been stable around 1.4-1.5 mg/dL.  Likely her baseline creatinine.  Monitor renal function closely.  Avoid NSAID use.     HTN  - continue amlodipine.  Agree with holding losartan and HCTZ.  - avoid Thiazide use in future due to hypercalcemia.  This medication needs to be discontinued at discharge.     Anemia of CKD.  Hemoglobin stable at 13 g.  Monitor.     Proteinuria.  Mild at 0.6 g per day.  Resume ARB  at the time of discharge if renal function stable.      -Portions of this note were created using voice recognition software.  It is possible that there are errors, which have persisted, despite proofreading.  If there is a question regarding contents of this document, please contact me for clarification.

## 2024-06-24 NOTE — HPI
54-year-old female admitted to the hospital with hypercalcemia.  I was asked to see the patient after elevated free light chain ratio in increased gammaglobulin level would noted.  For possible multiple myeloma.  ECOG status 1

## 2024-06-24 NOTE — PLAN OF CARE
Nutrition Recs: 6/24/24  1. Recommend pt continues Diabetic Low Sodium,2gm; 2000 Calorie.   2. Recommend pt receives Boost glucose control BID.   3. Re-weighed for accuracy, weigh daily afterwards.    Goals:   1. Pt will consume 100% of EEN/EPN prior RD follow up.   2. Pt weights will be updated prior to RD follow up.  Nutrition Goal Status: new  Communication of RD Recs: other (comment) (POC: Sticky Notes)  Anish Fuller MS, Registration Eligible, Provisional LDN

## 2024-06-24 NOTE — ASSESSMENT & PLAN NOTE
Patient is not currently on statin.will recommend patient begin statin at time of hospital discharge. Last Lipid Panel:   Lab Results   Component Value Date    CHOL 204 (H) 06/03/2024    HDL 33 (L) 06/03/2024    LDLCALC 133.4 06/03/2024    TRIG 188 (H) 06/03/2024    CHOLHDL 16.2 (L) 06/03/2024   Plan:  -low fat/low calorie diet  The 10-year ASCVD risk score (Otilio HUTCHINS, et al., 2019) is: 25.8%    Values used to calculate the score:      Age: 54 years      Sex: Female      Is Non- : Yes      Diabetic: Yes      Tobacco smoker: No      Systolic Blood Pressure: 142 mmHg      Is BP treated: Yes      HDL Cholesterol: 33 mg/dL      Total Cholesterol: 204 mg/dL  Would benefit from statin  Defer to primary care provider

## 2024-06-25 ENCOUNTER — TELEPHONE (OUTPATIENT)
Dept: FAMILY MEDICINE | Facility: CLINIC | Age: 54
End: 2024-06-25
Payer: COMMERCIAL

## 2024-06-25 LAB
ALBUMIN SERPL BCP-MCNC: 2.5 G/DL (ref 3.5–5.2)
ALP SERPL-CCNC: 92 U/L (ref 55–135)
ALT SERPL W/O P-5'-P-CCNC: 19 U/L (ref 10–44)
ANION GAP SERPL CALC-SCNC: 9 MMOL/L (ref 8–16)
ANION GAP SERPL CALC-SCNC: 9 MMOL/L (ref 8–16)
AST SERPL-CCNC: 31 U/L (ref 10–40)
B2 MICROGLOB SERPL-MCNC: 6.8 UG/ML (ref 0–2.5)
BASOPHILS # BLD AUTO: 0.05 K/UL (ref 0–0.2)
BASOPHILS NFR BLD: 1 % (ref 0–1.9)
BILIRUB SERPL-MCNC: 0.5 MG/DL (ref 0.1–1)
BUN SERPL-MCNC: 13 MG/DL (ref 6–20)
BUN SERPL-MCNC: 13 MG/DL (ref 6–20)
CALCIUM SERPL-MCNC: 9.3 MG/DL (ref 8.7–10.5)
CALCIUM SERPL-MCNC: 9.3 MG/DL (ref 8.7–10.5)
CHLORIDE SERPL-SCNC: 109 MMOL/L (ref 95–110)
CHLORIDE SERPL-SCNC: 109 MMOL/L (ref 95–110)
CO2 SERPL-SCNC: 19 MMOL/L (ref 23–29)
CO2 SERPL-SCNC: 19 MMOL/L (ref 23–29)
CREAT SERPL-MCNC: 1.2 MG/DL (ref 0.5–1.4)
CREAT SERPL-MCNC: 1.2 MG/DL (ref 0.5–1.4)
DIFFERENTIAL METHOD BLD: ABNORMAL
EOSINOPHIL # BLD AUTO: 0.4 K/UL (ref 0–0.5)
EOSINOPHIL NFR BLD: 8.1 % (ref 0–8)
ERYTHROCYTE [DISTWIDTH] IN BLOOD BY AUTOMATED COUNT: 13.7 % (ref 11.5–14.5)
EST. GFR  (NO RACE VARIABLE): 53 ML/MIN/1.73 M^2
EST. GFR  (NO RACE VARIABLE): 53 ML/MIN/1.73 M^2
GLUCOSE SERPL-MCNC: 79 MG/DL (ref 70–110)
GLUCOSE SERPL-MCNC: 79 MG/DL (ref 70–110)
HCT VFR BLD AUTO: 37.1 % (ref 37–48.5)
HGB BLD-MCNC: 12 G/DL (ref 12–16)
IMM GRANULOCYTES # BLD AUTO: 0.02 K/UL (ref 0–0.04)
IMM GRANULOCYTES NFR BLD AUTO: 0.4 % (ref 0–0.5)
LDH SERPL L TO P-CCNC: 153 U/L (ref 110–260)
LYMPHOCYTES # BLD AUTO: 1.1 K/UL (ref 1–4.8)
LYMPHOCYTES NFR BLD: 22.3 % (ref 18–48)
MCH RBC QN AUTO: 28.4 PG (ref 27–31)
MCHC RBC AUTO-ENTMCNC: 32.3 G/DL (ref 32–36)
MCV RBC AUTO: 88 FL (ref 82–98)
MONOCYTES # BLD AUTO: 0.7 K/UL (ref 0.3–1)
MONOCYTES NFR BLD: 14.2 % (ref 4–15)
NEUTROPHILS # BLD AUTO: 2.6 K/UL (ref 1.8–7.7)
NEUTROPHILS NFR BLD: 54 % (ref 38–73)
NRBC BLD-RTO: 0 /100 WBC
PATHOLOGIST INTERPRETATION SPE: NORMAL
PHOSPHATE SERPL-MCNC: 2.7 MG/DL (ref 2.7–4.5)
PLATELET # BLD AUTO: 318 K/UL (ref 150–450)
PMV BLD AUTO: 10.4 FL (ref 9.2–12.9)
POCT GLUCOSE: 110 MG/DL (ref 70–110)
POCT GLUCOSE: 78 MG/DL (ref 70–110)
POCT GLUCOSE: 90 MG/DL (ref 70–110)
POCT GLUCOSE: 97 MG/DL (ref 70–110)
POTASSIUM SERPL-SCNC: 3.7 MMOL/L (ref 3.5–5.1)
POTASSIUM SERPL-SCNC: 3.7 MMOL/L (ref 3.5–5.1)
PROT 24H UR-MRATE: 912 MG/SPEC (ref 0–100)
PROT SERPL-MCNC: 9.5 G/DL (ref 6–8.4)
PROT UR-MCNC: 24 MG/DL (ref 0–15)
RBC # BLD AUTO: 4.23 M/UL (ref 4–5.4)
SODIUM SERPL-SCNC: 137 MMOL/L (ref 136–145)
SODIUM SERPL-SCNC: 137 MMOL/L (ref 136–145)
URINE COLLECTION DURATION: 24 HR
URINE VOLUME: 3800 ML
WBC # BLD AUTO: 4.79 K/UL (ref 3.9–12.7)

## 2024-06-25 PROCEDURE — 84166 PROTEIN E-PHORESIS/URINE/CSF: CPT | Performed by: FAMILY MEDICINE

## 2024-06-25 PROCEDURE — 84156 ASSAY OF PROTEIN URINE: CPT | Performed by: FAMILY MEDICINE

## 2024-06-25 PROCEDURE — 83615 LACTATE (LD) (LDH) ENZYME: CPT | Performed by: FAMILY MEDICINE

## 2024-06-25 PROCEDURE — 21400001 HC TELEMETRY ROOM

## 2024-06-25 PROCEDURE — 25000003 PHARM REV CODE 250: Performed by: FAMILY MEDICINE

## 2024-06-25 PROCEDURE — 25000003 PHARM REV CODE 250: Performed by: HOSPITALIST

## 2024-06-25 PROCEDURE — 80053 COMPREHEN METABOLIC PANEL: CPT | Performed by: HOSPITALIST

## 2024-06-25 PROCEDURE — 85025 COMPLETE CBC W/AUTO DIFF WBC: CPT | Performed by: HOSPITALIST

## 2024-06-25 PROCEDURE — 86335 IMMUNFIX E-PHORSIS/URINE/CSF: CPT | Performed by: FAMILY MEDICINE

## 2024-06-25 PROCEDURE — 36415 COLL VENOUS BLD VENIPUNCTURE: CPT | Performed by: HOSPITALIST

## 2024-06-25 PROCEDURE — 99232 SBSQ HOSP IP/OBS MODERATE 35: CPT | Mod: ,,, | Performed by: INTERNAL MEDICINE

## 2024-06-25 PROCEDURE — 99233 SBSQ HOSP IP/OBS HIGH 50: CPT | Mod: ,,, | Performed by: INTERNAL MEDICINE

## 2024-06-25 PROCEDURE — 82232 ASSAY OF BETA-2 PROTEIN: CPT | Performed by: FAMILY MEDICINE

## 2024-06-25 PROCEDURE — 80069 RENAL FUNCTION PANEL: CPT | Performed by: INTERNAL MEDICINE

## 2024-06-25 RX ADMIN — DOCUSATE SODIUM AND SENNOSIDES 1 TABLET: 8.6; 5 TABLET, FILM COATED ORAL at 09:06

## 2024-06-25 RX ADMIN — SODIUM CHLORIDE: 9 INJECTION, SOLUTION INTRAVENOUS at 09:06

## 2024-06-25 RX ADMIN — SODIUM CHLORIDE: 9 INJECTION, SOLUTION INTRAVENOUS at 06:06

## 2024-06-25 RX ADMIN — AMLODIPINE BESYLATE 10 MG: 10 TABLET ORAL at 09:06

## 2024-06-25 NOTE — PROGRESS NOTES
[facility] Medicine  Progress Note    Patient Name: Rosibel Herrera  MRN: [id number]  Patient Class: IP- Inpatient   Admission Date: 6/22/2024  Length of Stay: 2 days  Attending Physician: Tammy Granger MD  Primary Care Provider: Briana Vu DO        Subjective:     Principal Problem:Hypercalcemia        HPI:  Rosibel Herrera is a 54 y.o. female with a PMH  has a past medical history of Calculus of kidney (4/9/2014 2:20:53 PM), Complications affecting other specified body systems, hypertension (3/26/2014 1:38:44 PM), Excessive or frequent menstruation (3/20/2014 9:22:15 AM), Hypertension, and Leiomyoma of uterus (3/20/2014 9:23:21 AM). who presented to the ED directed by primary nephrologist for abnormal lab findings consist of hypercalcemia.  Patient presented with persistent complaints of fatigue, nausea, and constipation x2 weeks duration and was found to have hypercalcemia measuring 12.1 with corrected calcium measuring 13.1.  Patient denied endorsing any other symptoms and reported being in her usual state of health with no other concerns or complaints.  Nephrology was consulted by ED staff to obtain further recommendations and recommended patient be initiated on normal saline at 100 cc/hour with repeat labs in the morning.  Patient admitted to Hospital Medicine under observation for continued medical management.    PCP: Briana Vu    Overview/Hospital Course:  6/23 admitted for hypercalcemia. Instructed to come to emergency department due to labs. Unclear etiology. Referral to nephrology by primary care provider due to abnormal kidney function. States urinary frequency, frothy in character. Associated with confusion, nausea, vomiting, fatigue, reduced appetite. Nephrology consulted. Increase fluids  6/24 hypercalcemia persists. Continue intravenous fluids. Nephrology recommending hem/onc consult due to abnormal kappa/lambda light chains. Patient reports  mild confusion, kidney stones but denies bone pain or abdominal pain.  6/25 hem/onc consulted for abnormal free light chain test. Recommendations reviewed with outpatient further workup. Continue fluids. Hypercalcemia improving.    Interval History: See hospital course for today      Review of Systems   Constitutional:  Positive for appetite change (improving) and fatigue (improving). Negative for activity change and fever.   Respiratory:  Positive for shortness of breath (improving).    Cardiovascular:  Negative for chest pain and leg swelling.   Gastrointestinal:  Positive for constipation (improving). Negative for abdominal pain, nausea and vomiting.   Genitourinary:  Positive for frequency.   Neurological:  Positive for weakness.   Psychiatric/Behavioral:  Positive for decreased concentration (improving).      Objective:     Vital Signs (Most Recent):  Temp: 98.3 °F (36.8 °C) (06/25/24 0700)  Pulse: 74 (06/25/24 0904)  Resp: 16 (06/25/24 0700)  BP: (!) 161/79 (06/25/24 0700)  SpO2: 99 % (06/25/24 0700) Vital Signs (24h Range):  Temp:  [98 °F (36.7 °C)-99.5 °F (37.5 °C)] 98.3 °F (36.8 °C)  Pulse:  [60-79] 74  Resp:  [16-18] 16  SpO2:  [97 %-99 %] 99 %  BP: (130-161)/(77-84) 161/79     Weight: 79.1 kg (174 lb 6.1 oz)  Body mass index is 30.89 kg/m².    Intake/Output Summary (Last 24 hours) at 6/25/2024 0958  Last data filed at 6/25/2024 0640  Gross per 24 hour   Intake 2874.5 ml   Output 2 ml   Net 2872.5 ml         Physical Exam  Vitals and nursing note reviewed. Exam conducted with a chaperone present (nursing).   Constitutional:       General: She is not in acute distress.     Appearance: She is not toxic-appearing.   HENT:      Head: Normocephalic and atraumatic.   Cardiovascular:      Rate and Rhythm: Normal rate.   Pulmonary:      Effort: Pulmonary effort is normal. No respiratory distress.   Abdominal:      Palpations: Abdomen is soft.   Musculoskeletal:      Right lower leg: No edema.      Left lower leg:  "No edema.   Skin:     General: Skin is warm.   Neurological:      Mental Status: She is alert and oriented to person, place, and time.   Psychiatric:         Mood and Affect: Mood normal.         Behavior: Behavior normal.             Significant Labs: All pertinent labs within the past 24 hours have been reviewed.  CBC:   Recent Labs   Lab 06/24/24  0440 06/25/24  0537   WBC 5.18 4.79   HGB 12.5 12.0   HCT 38.5 37.1    318     CMP:   Recent Labs   Lab 06/24/24  0440 06/25/24  0536    137  137   K 4.0 3.7  3.7    109  109   CO2 19* 19*  19*   GLU 81 79  79   BUN 16 13  13   CREATININE 1.4 1.2  1.2   CALCIUM 10.5 9.3  9.3   PROT 9.4* 9.5*   ALBUMIN 2.6* 2.5*  2.5*  2.5*   BILITOT 0.6 0.5   ALKPHOS 85 92   AST 23 31   ALT 15 19   ANIONGAP 12 9  9       Significant Imaging: I have reviewed all pertinent imaging results/findings within the past 24 hours.    Assessment/Plan:      * Hypercalcemia  The patient has hypercalcemia that is currently uncontrolled. The patient has the following symptoms due to their hypercalcemia: weakness, fatigue, and constipation. The hypercalcemia is likely due to  unknown etiology currently but is noted to be on HCTZ as well as Vit D outpatient . We will obtain the following labs to work up the hypercalcemia:  currently underway . We will treat the hypercalcemia with: IV fluids ordered at a rate of 100 ml/hr.  Patient also started on stool softeners.  Home Hyzaar and vitamin-D currently on hold.  Patient needing PTH and vitamin-D level.  Their latest calcium has been reviewed and is listed below.  No results found for: "CAION"  Minimal improvement with fluids  Increase rate of fluids  Nephrology consulted   Abnormal serum protein and free light chain ratio  Pth within normal limits   Hem/onc consulted with follow up outpatient     SHENA (acute kidney injury)  Patient with acute kidney injury/acute renal failure vs CKD. Etiology currently unknown but may be due to "  progressive disease versus adverse side effect from medications.  SHENA is currently  undergoing medical management . Baseline creatinine  1.2-1.5  - Labs reviewed- Renal function/electrolytes with Estimated Creatinine Clearance: 52.1 mL/min (based on SCr of 1.2 mg/dL). according to latest data. Monitor urine output and serial BMP and adjust therapy as needed. Avoid nephrotoxins and renally dose meds for GFR listed above.  Patient currently follows Nephrology outpatient.  Nephrology consulted     Class 1 obesity due to excess calories with body mass index (BMI) of 30.0 to 30.9 in adult  Body mass index is 32.14 kg/m². Morbid obesity complicates all aspects of disease management from diagnostic modalities to treatment. Weight loss encouraged and health benefits explained to patient.       Hypertension  Chronic, controlled. Latest blood pressure and vitals reviewed-     Temp:  [98 °F (36.7 °C)-99.5 °F (37.5 °C)]   Pulse:  [60-79]   Resp:  [16-18]   BP: (130-161)/(77-84)   SpO2:  [97 %-99 %] .   Home meds for hypertension were reviewed and noted below.   Hypertension Medications               amLODIPine (NORVASC) 5 MG tablet Take 10 mg by mouth.    losartan-hydrochlorothiazide 50-12.5 mg (HYZAAR) 50-12.5 mg per tablet Take 1 tablet by mouth once daily.     While in the hospital, will manage blood pressure as follows; Adjust home antihypertensive regimen as follows- continue amlodipine and hold Hyzaar given SHENA and hypercalcemia    Will utilize p.r.n. blood pressure medication only if patient's blood pressure greater than 180/110 and she develops symptoms such as worsening chest pain or shortness of breath.      Vitamin D deficiency  Patient currently on outpatient supplement replacement therapy currently on hold due to hypercalcemia as noted above.  Plan:  -hold home medications  -f/u with PCP/Nephrology to resume following hospital discharge      Type 2 diabetes mellitus  Patient's FSGs are controlled on current  medication regimen.  Last A1c reviewed-   Lab Results   Component Value Date    HGBA1C 6.2 (H) 06/03/2024     Most recent fingerstick glucose reviewed-   Recent Labs   Lab 06/24/24  1200 06/24/24  1720 06/24/24 2117 06/25/24  0552   POCTGLUCOSE 93 105 85 78       Current correctional scale  Low  Titrate as needed  anti-hyperglycemic dose as follows-   Antihyperglycemics (From admission, onward)      Start     Stop Route Frequency Ordered    06/22/24 2232  insulin aspart U-100 pen 0-5 Units         -- SubQ Before meals & nightly PRN 06/22/24 2132        Plan:  Poor po intake   -SSI  -A1c 6.2  -Accu-checks  -Hold oral hypoglycemics while patient is in the hospital  -Hypoglycemic protocol          Dyslipidemia  Patient is not currently on statin.will recommend patient begin statin at time of hospital discharge. Last Lipid Panel:   Lab Results   Component Value Date    CHOL 204 (H) 06/03/2024    HDL 33 (L) 06/03/2024    LDLCALC 133.4 06/03/2024    TRIG 188 (H) 06/03/2024    CHOLHDL 16.2 (L) 06/03/2024   Plan:  -low fat/low calorie diet  The 10-year ASCVD risk score (Otilio HUTCHINS, et al., 2019) is: 36.8%    Values used to calculate the score:      Age: 54 years      Sex: Female      Is Non- : Yes      Diabetic: Yes      Tobacco smoker: No      Systolic Blood Pressure: 161 mmHg      Is BP treated: Yes      HDL Cholesterol: 33 mg/dL      Total Cholesterol: 204 mg/dL  Would benefit from statin  Defer to primary care provider         VTE Risk Mitigation (From admission, onward)           Ordered     enoxaparin injection 40 mg  Daily         06/22/24 2132     IP VTE HIGH RISK PATIENT  Once         06/22/24 2132     Place sequential compression device  Until discontinued         06/22/24 2132                    Discharge Planning   SHER:      Code Status: Full Code   Is the patient medically ready for discharge?:     Reason for patient still in hospital (select all that apply): Patient trending condition,  Laboratory test, Treatment, Consult recommendations, and Pending disposition  Discharge Plan A: Home                  Tammy Granger MD  Department of Hospital Medicine   '[place] [place] ([facility])

## 2024-06-25 NOTE — PLAN OF CARE
A251/A251 CLYDEMeg Herrera is a 54 y.o.female admitted on 6/22/2024 for Hypercalcemia   Code Status: Full Code MRN: [id number]   Review of patient's allergies indicates:   Allergen Reactions    Tylox [oxycodone-acetaminophen]      Past Medical History:   Diagnosis Date    Calculus of kidney 4/9/2014 2:20:53 PM    [facility] - Urology: KidneyStones-No Additional Notes    Complications affecting other specified body systems, hypertension 3/26/2014 1:38:44 PM    [facility] - Cardiovascular: Hypertension-No Additional Notes    Excessive or frequent menstruation 3/20/2014 9:22:15 AM    [facility]: Bleeding, Menorrhagia-No Additional Notes    Hypertension     Leiomyoma of uterus 3/20/2014 9:23:21 AM    [facility]: Fibroids/Leiomyoma of Uterus-2.1x1.8cm (POST/LT); 3.9x3.5cm (ANT TO ENDO)      PRN meds    acetaminophen, 650 mg, Q6H PRN  acetaminophen, 650 mg, Q8H PRN  albuterol-ipratropium, 3 mL, Q6H PRN  aluminum-magnesium hydroxide-simethicone, 30 mL, QID PRN  dextrose 10%, 12.5 g, PRN  dextrose 10%, 25 g, PRN  glucagon (human recombinant), 1 mg, PRN  glucose, 16 g, PRN  glucose, 24 g, PRN  hydrALAZINE, 10 mg, Q6H PRN  insulin aspart U-100, 0-5 Units, QID (AC + HS) PRN  melatonin, 6 mg, Nightly PRN  naloxone, 0.02 mg, PRN  ondansetron, 4 mg, Q8H PRN  promethazine, 25 mg, Q6H PRN  senna-docusate 8.6-50 mg, 1 tablet, BID PRN  sodium chloride 0.9%, 10 mL, Q12H PRN      Chart check completed. Will continue plan of care.      Orientation: oriented x 4        Lead Monitored: Lead II Rhythm: normal sinus rhythm    Cardiac/Telemetry Box Number: [id number]  VTE Required Core Measure: Patient refused interventions Last Bowel Movement: 06/24/24  Diet diabetic Low Sodium,2gm; 2000 Calorie     Matheus Score: 21  Fall Risk Score: 6  Accucheck [x]   Freq? q6hr     Lines/Drains/Airways       Peripheral Intravenous Line  Duration                  Peripheral IV - Single Lumen  06/22/24 2041 20 G Left Antecubital 1 day

## 2024-06-25 NOTE — PROGRESS NOTES
[facility] - Telemetry ([facility])  Hematology/Oncology  Progress Note    Patient Name: Rosibel Herrera  Admission Date: 6/22/2024  Hospital Length of Stay: 2 days  Code Status: Full Code     Subjective:     HPI:  54-year-old female admitted to the hospital with hypercalcemia.  I was asked to see the patient after elevated free light chain ratio in increased gammaglobulin level would noted.  For possible multiple myeloma.  ECOG status 1    Interval History:  Patient is very anxious about potential diagnosis of multiple myeloma    Oncology Treatment Plan:   [No matching plan found]    Medications:  Continuous Infusions:   0.9% NaCl   Intravenous Continuous 125 mL/hr at 06/25/24 0640 Rate Verify at 06/25/24 0640     Scheduled Meds:   amLODIPine  10 mg Oral Daily    enoxparin  40 mg Subcutaneous Daily    polyethylene glycol  17 g Oral Daily    senna-docusate 8.6-50 mg  1 tablet Oral BID     PRN Meds:  Current Facility-Administered Medications:     acetaminophen, 650 mg, Rectal, Q6H PRN    acetaminophen, 650 mg, Oral, Q8H PRN    albuterol-ipratropium, 3 mL, Nebulization, Q6H PRN    aluminum-magnesium hydroxide-simethicone, 30 mL, Oral, QID PRN    dextrose 10%, 12.5 g, Intravenous, PRN    dextrose 10%, 25 g, Intravenous, PRN    glucagon (human recombinant), 1 mg, Intramuscular, PRN    glucose, 16 g, Oral, PRN    glucose, 24 g, Oral, PRN    hydrALAZINE, 10 mg, Intravenous, Q6H PRN    insulin aspart U-100, 0-5 Units, Subcutaneous, QID (AC + HS) PRN    melatonin, 6 mg, Oral, Nightly PRN    naloxone, 0.02 mg, Intravenous, PRN    ondansetron, 4 mg, Intravenous, Q8H PRN    promethazine, 25 mg, Oral, Q6H PRN    senna-docusate 8.6-50 mg, 1 tablet, Oral, BID PRN    sodium chloride 0.9%, 10 mL, Intravenous, Q12H PRN     Review of Systems   Constitutional:  Negative for activity change, appetite change, chills, diaphoresis, fatigue, fever and unexpected weight change.   HENT:  Negative for congestion, dental problem, drooling, ear  discharge, ear pain, facial swelling, hearing loss, mouth sores, nosebleeds, postnasal drip, rhinorrhea, sinus pressure, sneezing, sore throat, tinnitus, trouble swallowing and voice change.    Eyes:  Negative for photophobia, pain, discharge, redness, itching and visual disturbance.   Respiratory:  Negative for cough, choking, chest tightness, shortness of breath, wheezing and stridor.    Cardiovascular:  Negative for chest pain, palpitations and leg swelling.   Gastrointestinal:  Negative for abdominal distention, abdominal pain, anal bleeding, blood in stool, constipation, diarrhea, nausea, rectal pain and vomiting.   Endocrine: Negative for cold intolerance, heat intolerance, polydipsia, polyphagia and polyuria.   Genitourinary:  Negative for decreased urine volume, difficulty urinating, dyspareunia, dysuria, enuresis, flank pain, frequency, genital sores, hematuria, menstrual problem, pelvic pain, urgency, vaginal bleeding, vaginal discharge and vaginal pain.   Musculoskeletal:  Negative for arthralgias, back pain, gait problem, joint swelling, myalgias, neck pain and neck stiffness.   Skin:  Negative for color change, pallor and rash.   Allergic/Immunologic: Negative for environmental allergies, food allergies and immunocompromised state.   Neurological:  Negative for dizziness, tremors, seizures, syncope, facial asymmetry, speech difficulty, weakness, light-headedness, numbness and headaches.   Hematological:  Negative for adenopathy. Does not bruise/bleed easily.   Psychiatric/Behavioral:  Positive for dysphoric mood. Negative for agitation, behavioral problems, confusion, decreased concentration, hallucinations, self-injury, sleep disturbance and suicidal ideas. The patient is nervous/anxious. The patient is not hyperactive.      Objective:     Vital Signs (Most Recent):  Temp: 98.3 °F (36.8 °C) (06/25/24 0700)  Pulse: 60 (06/25/24 0700)  Resp: 16 (06/25/24 0700)  BP: (!) 161/79 (06/25/24 0700)  SpO2: 99 %  (06/25/24 0700) Vital Signs (24h Range):  Temp:  [98 °F (36.7 °C)-99.5 °F (37.5 °C)] 98.3 °F (36.8 °C)  Pulse:  [60-79] 60  Resp:  [16-18] 16  SpO2:  [94 %-99 %] 99 %  BP: (130-161)/(68-84) 161/79     Weight: 79.1 kg (174 lb 6.1 oz)  Body mass index is 30.89 kg/m².  Body surface area is 1.88 meters squared.      Intake/Output Summary (Last 24 hours) at 6/25/2024 0719  Last data filed at 6/25/2024 0640  Gross per 24 hour   Intake 2874.5 ml   Output 2 ml   Net 2872.5 ml        Physical Exam  Vitals reviewed.   Constitutional:       General: She is not in acute distress.     Appearance: She is well-developed. She is not diaphoretic.   HENT:      Head: Normocephalic and atraumatic.      Right Ear: External ear normal.      Left Ear: External ear normal.      Nose: Nose normal.      Right Sinus: No maxillary sinus tenderness or frontal sinus tenderness.      Left Sinus: No maxillary sinus tenderness or frontal sinus tenderness.      Mouth/Throat:      Pharynx: No oropharyngeal exudate.   Eyes:      General: Lids are normal. No scleral icterus.        Right eye: No discharge.         Left eye: No discharge.      Conjunctiva/sclera: Conjunctivae normal.      Right eye: Right conjunctiva is not injected. No hemorrhage.     Left eye: Left conjunctiva is not injected. No hemorrhage.     Pupils: Pupils are equal, round, and reactive to light.   Neck:      Thyroid: No thyromegaly.      Vascular: No JVD.      Trachea: No tracheal deviation.   Cardiovascular:      Rate and Rhythm: Normal rate.   Pulmonary:      Effort: Pulmonary effort is normal. No respiratory distress.      Breath sounds: No stridor.   Chest:      Chest wall: No tenderness.   Abdominal:      General: Bowel sounds are normal. There is no distension.      Palpations: Abdomen is soft. There is no hepatomegaly, splenomegaly or mass.      Tenderness: There is no abdominal tenderness. There is no rebound.   Musculoskeletal:         General: No tenderness. Normal  "range of motion.      Cervical back: Normal range of motion and neck supple.   Lymphadenopathy:      Cervical: No cervical adenopathy.      Upper Body:      Right upper body: No supraclavicular adenopathy.      Left upper body: No supraclavicular adenopathy.   Skin:     General: Skin is dry.      Findings: No erythema or rash.   Neurological:      Mental Status: She is alert and oriented to person, place, and time.      Cranial Nerves: No cranial nerve deficit.      Coordination: Coordination normal.   Psychiatric:         Behavior: Behavior normal.         Thought Content: Thought content normal.         Judgment: Judgment normal.          Significant Labs:   BMP:   Recent Labs   Lab 06/24/24  0440   GLU 81      K 4.0      CO2 19*   BUN 16   CREATININE 1.4   CALCIUM 10.5   , CBC:   Recent Labs   Lab 06/24/24 0440 06/25/24  0537   WBC 5.18 4.79   HGB 12.5 12.0   HCT 38.5 37.1    318   , CMP:   Recent Labs   Lab 06/24/24  0440      K 4.0      CO2 19*   GLU 81   BUN 16   CREATININE 1.4   CALCIUM 10.5   PROT 9.4*   ALBUMIN 2.6*   BILITOT 0.6   ALKPHOS 85   AST 23   ALT 15   ANIONGAP 12   , Coagulation: No results for input(s): "PT", "INR", "APTT" in the last 48 hours., Haptoglobin: No results for input(s): "HAPTOGLOBIN" in the last 48 hours., Immunology:   Recent Labs   Lab 06/23/24  1215   FREELAMBDALI 7.48*   , LDH: No results for input(s): "LDHCSF", "BFSOURCE" in the last 48 hours., LFTs:   Recent Labs   Lab 06/24/24  0440   ALT 15   AST 23   ALKPHOS 85   BILITOT 0.6   PROT 9.4*   ALBUMIN 2.6*   , Reticulocytes: No results for input(s): "RETIC" in the last 48 hours., Tumor Markers: No results for input(s): "PSA", "CEA", "", "AFPTM", "AA8720", "" in the last 48 hours.    Invalid input(s): "ALGTM", Uric Acid No results for input(s): "URICACID" in the last 48 hours., and Urine Studies: No results for input(s): "COLORU", "APPEARANCEUA", "PHUR", "SPECGRAV", "PROTEINUA", "GLUCUA", " ""KETONESU", "BILIRUBINUA", "OCCULTUA", "NITRITE", "UROBILINOGEN", "LEUKOCYTESUR", "RBCUA", "WBCUA", "BACTERIA", "SQUAMEPITHEL", "HYALINECASTS" in the last 48 hours.    Invalid input(s): "WRIGHTSUR"    Diagnostic Results:  I have reviewed all pertinent imaging results/findings within the past 24 hours.  Assessment/Plan:     * Hypercalcemia  I have reviewed records patient has elevated free light chain ratio in elevated gammaglobulin awaiting for interpretation by pathologist serum protein electrophoresis I am very concerned that this patient most likely has multiple myeloma.  I have talked to the patient about the diagnosis I would recommend that the patient have a bone marrow biopsy and a PET scan be done as outpatient.  Diagnosis for the next several weeks.  Will ask our navigation team coordinate patient is discharged in follow-up patient history of multiple she is years ago talked to her fact that this is dramatically different at this time.  Assuming diagnosis is correct  06/25/2024 discussed situation again with patient at this time I am concerned about the possibility of ultimate myeloma with elevated gammaglobulin on serum protein electrophoresis we do not have interpretation as of yet she does have an elevated free light chain ratio.  Again I would recommend proceeding with a PET scan as soon as possible.  In a CT-directed bone marrow biopsy with plasma cell proliferative index.  Discussed implications of answered questions with her about the nature of hypercalcemia in the spectrum from monoclonal gammopathy of undetermined significance to lyle multiple myeloma.  In various treatment options non treatment options based off of determination if this is correct diagnosis    SHENA (acute kidney injury)  Cared for by primary care team    Type 2 diabetes mellitus  Cared for by primary care team    Dyslipidemia  Cared for by primary care team        Thank you for your consult. I will follow-up with patient. Please " contact us if you have any additional questions.     Marco Alford MD  Hematology/Oncology  [facility] - Telemetry ([facility])

## 2024-06-25 NOTE — SUBJECTIVE & OBJECTIVE
Interval History:  Patient is very anxious about potential diagnosis of multiple myeloma    Oncology Treatment Plan:   [No matching plan found]    Medications:  Continuous Infusions:   0.9% NaCl   Intravenous Continuous 125 mL/hr at 06/25/24 0640 Rate Verify at 06/25/24 0640     Scheduled Meds:   amLODIPine  10 mg Oral Daily    enoxparin  40 mg Subcutaneous Daily    polyethylene glycol  17 g Oral Daily    senna-docusate 8.6-50 mg  1 tablet Oral BID     PRN Meds:  Current Facility-Administered Medications:     acetaminophen, 650 mg, Rectal, Q6H PRN    acetaminophen, 650 mg, Oral, Q8H PRN    albuterol-ipratropium, 3 mL, Nebulization, Q6H PRN    aluminum-magnesium hydroxide-simethicone, 30 mL, Oral, QID PRN    dextrose 10%, 12.5 g, Intravenous, PRN    dextrose 10%, 25 g, Intravenous, PRN    glucagon (human recombinant), 1 mg, Intramuscular, PRN    glucose, 16 g, Oral, PRN    glucose, 24 g, Oral, PRN    hydrALAZINE, 10 mg, Intravenous, Q6H PRN    insulin aspart U-100, 0-5 Units, Subcutaneous, QID (AC + HS) PRN    melatonin, 6 mg, Oral, Nightly PRN    naloxone, 0.02 mg, Intravenous, PRN    ondansetron, 4 mg, Intravenous, Q8H PRN    promethazine, 25 mg, Oral, Q6H PRN    senna-docusate 8.6-50 mg, 1 tablet, Oral, BID PRN    sodium chloride 0.9%, 10 mL, Intravenous, Q12H PRN     Review of Systems   Constitutional:  Negative for activity change, appetite change, chills, diaphoresis, fatigue, fever and unexpected weight change.   HENT:  Negative for congestion, dental problem, drooling, ear discharge, ear pain, facial swelling, hearing loss, mouth sores, nosebleeds, postnasal drip, rhinorrhea, sinus pressure, sneezing, sore throat, tinnitus, trouble swallowing and voice change.    Eyes:  Negative for photophobia, pain, discharge, redness, itching and visual disturbance.   Respiratory:  Negative for cough, choking, chest tightness, shortness of breath, wheezing and stridor.    Cardiovascular:  Negative for chest pain,  palpitations and leg swelling.   Gastrointestinal:  Negative for abdominal distention, abdominal pain, anal bleeding, blood in stool, constipation, diarrhea, nausea, rectal pain and vomiting.   Endocrine: Negative for cold intolerance, heat intolerance, polydipsia, polyphagia and polyuria.   Genitourinary:  Negative for decreased urine volume, difficulty urinating, dyspareunia, dysuria, enuresis, flank pain, frequency, genital sores, hematuria, menstrual problem, pelvic pain, urgency, vaginal bleeding, vaginal discharge and vaginal pain.   Musculoskeletal:  Negative for arthralgias, back pain, gait problem, joint swelling, myalgias, neck pain and neck stiffness.   Skin:  Negative for color change, pallor and rash.   Allergic/Immunologic: Negative for environmental allergies, food allergies and immunocompromised state.   Neurological:  Negative for dizziness, tremors, seizures, syncope, facial asymmetry, speech difficulty, weakness, light-headedness, numbness and headaches.   Hematological:  Negative for adenopathy. Does not bruise/bleed easily.   Psychiatric/Behavioral:  Positive for dysphoric mood. Negative for agitation, behavioral problems, confusion, decreased concentration, hallucinations, self-injury, sleep disturbance and suicidal ideas. The patient is nervous/anxious. The patient is not hyperactive.      Objective:     Vital Signs (Most Recent):  Temp: 98.3 °F (36.8 °C) (06/25/24 0700)  Pulse: 60 (06/25/24 0700)  Resp: 16 (06/25/24 0700)  BP: (!) 161/79 (06/25/24 0700)  SpO2: 99 % (06/25/24 0700) Vital Signs (24h Range):  Temp:  [98 °F (36.7 °C)-99.5 °F (37.5 °C)] 98.3 °F (36.8 °C)  Pulse:  [60-79] 60  Resp:  [16-18] 16  SpO2:  [94 %-99 %] 99 %  BP: (130-161)/(68-84) 161/79     Weight: 79.1 kg (174 lb 6.1 oz)  Body mass index is 30.89 kg/m².  Body surface area is 1.88 meters squared.      Intake/Output Summary (Last 24 hours) at 6/25/2024 4358  Last data filed at 6/25/2024 0640  Gross per 24 hour   Intake  2874.5 ml   Output 2 ml   Net 2872.5 ml        Physical Exam  Vitals reviewed.   Constitutional:       General: She is not in acute distress.     Appearance: She is well-developed. She is not diaphoretic.   HENT:      Head: Normocephalic and atraumatic.      Right Ear: External ear normal.      Left Ear: External ear normal.      Nose: Nose normal.      Right Sinus: No maxillary sinus tenderness or frontal sinus tenderness.      Left Sinus: No maxillary sinus tenderness or frontal sinus tenderness.      Mouth/Throat:      Pharynx: No oropharyngeal exudate.   Eyes:      General: Lids are normal. No scleral icterus.        Right eye: No discharge.         Left eye: No discharge.      Conjunctiva/sclera: Conjunctivae normal.      Right eye: Right conjunctiva is not injected. No hemorrhage.     Left eye: Left conjunctiva is not injected. No hemorrhage.     Pupils: Pupils are equal, round, and reactive to light.   Neck:      Thyroid: No thyromegaly.      Vascular: No JVD.      Trachea: No tracheal deviation.   Cardiovascular:      Rate and Rhythm: Normal rate.   Pulmonary:      Effort: Pulmonary effort is normal. No respiratory distress.      Breath sounds: No stridor.   Chest:      Chest wall: No tenderness.   Abdominal:      General: Bowel sounds are normal. There is no distension.      Palpations: Abdomen is soft. There is no hepatomegaly, splenomegaly or mass.      Tenderness: There is no abdominal tenderness. There is no rebound.   Musculoskeletal:         General: No tenderness. Normal range of motion.      Cervical back: Normal range of motion and neck supple.   Lymphadenopathy:      Cervical: No cervical adenopathy.      Upper Body:      Right upper body: No supraclavicular adenopathy.      Left upper body: No supraclavicular adenopathy.   Skin:     General: Skin is dry.      Findings: No erythema or rash.   Neurological:      Mental Status: She is alert and oriented to person, place, and time.      Cranial  "Nerves: No cranial nerve deficit.      Coordination: Coordination normal.   Psychiatric:         Behavior: Behavior normal.         Thought Content: Thought content normal.         Judgment: Judgment normal.          Significant Labs:   BMP:   Recent Labs   Lab 06/24/24  0440   GLU 81      K 4.0      CO2 19*   BUN 16   CREATININE 1.4   CALCIUM 10.5   , CBC:   Recent Labs   Lab 06/24/24  0440 06/25/24  0537   WBC 5.18 4.79   HGB 12.5 12.0   HCT 38.5 37.1    318   , CMP:   Recent Labs   Lab 06/24/24  0440      K 4.0      CO2 19*   GLU 81   BUN 16   CREATININE 1.4   CALCIUM 10.5   PROT 9.4*   ALBUMIN 2.6*   BILITOT 0.6   ALKPHOS 85   AST 23   ALT 15   ANIONGAP 12   , Coagulation: No results for input(s): "PT", "INR", "APTT" in the last 48 hours., Haptoglobin: No results for input(s): "HAPTOGLOBIN" in the last 48 hours., Immunology:   Recent Labs   Lab 06/23/24  1215   FREELAMBDALI 7.48*   , LDH: No results for input(s): "LDHCSF", "BFSOURCE" in the last 48 hours., LFTs:   Recent Labs   Lab 06/24/24  0440   ALT 15   AST 23   ALKPHOS 85   BILITOT 0.6   PROT 9.4*   ALBUMIN 2.6*   , Reticulocytes: No results for input(s): "RETIC" in the last 48 hours., Tumor Markers: No results for input(s): "PSA", "CEA", "", "AFPTM", "[id number]", "" in the last 48 hours.    Invalid input(s): "ALGTM", Uric Acid No results for input(s): "URICACID" in the last 48 hours., and Urine Studies: No results for input(s): "COLORU", "APPEARANCEUA", "PHUR", "SPECGRAV", "PROTEINUA", "GLUCUA", "KETONESU", "BILIRUBINUA", "OCCULTUA", "NITRITE", "UROBILINOGEN", "LEUKOCYTESUR", "RBCUA", "WBCUA", "BACTERIA", "SQUAMEPITHEL", "HYALINECASTS" in the last 48 hours.    Invalid input(s): "WRIGHTSUR"    Diagnostic Results:  I have reviewed all pertinent imaging results/findings within the past 24 hours.  "

## 2024-06-25 NOTE — PROGRESS NOTES
Nephrology Progress Note:    HPI:   Rosibel Herrera is a 54 y.o. female with a hx of hypertension, remote history of kidney stones, metabolic syndrome, patient was admitted hospital yesterday for further evaluation of persistent hypercalcemia.  She had lab work on 06/03/2024 that showed a serum calcium of 12.1, creatinine 1.5 mg/dL.  She had an appointment with outpatient Nephrology for further evaluation, repeat labs yesterday showed a creatinine up again 12.1, patient was advised to come to the hospital for further evaluation and management.  She was started on IV fluids yesterday with slight improvement in serum creatinine to 11 today.  Patient takes vitamin-D supplements but denied recent significant calcium intake.      Due to her hypercalcemia, Nephrology was consulted on 06/23/2024.     Interval History:   Chart reviewed/patient examined.  Uneventful night.  She is feeling better and denies nausea, vomiting, or shortness of breath.  She denies numbness/tingling, or muscle aches.  She is tolerating her diet.  No family is present.      Of note, she has been seen by Hematology/Oncology, with testing ordered.    Health Status   Allergies:    is allergic to tylox [oxycodone-acetaminophen].    Current medications:     Current Facility-Administered Medications:     0.9%  NaCl infusion, , Intravenous, Continuous, Tammy Granger MD, Last Rate: 125 mL/hr at 06/25/24 0934, New Bag at 06/25/24 0934    acetaminophen suppository 650 mg, 650 mg, Rectal, Q6H PRN, Fransisco Mora MD    acetaminophen tablet 650 mg, 650 mg, Oral, Q8H PRN, Fransisco Mora MD    albuterol-ipratropium 2.5 mg-0.5 mg/3 mL nebulizer solution 3 mL, 3 mL, Nebulization, Q6H PRN, Fransisco Mora MD    aluminum-magnesium hydroxide-simethicone 200-200-20 mg/5 mL suspension 30 mL, 30 mL, Oral, QID PRN, Fransisco Mora MD    amLODIPine tablet 10 mg, 10 mg, Oral, Daily, Fransisco Mora MD, 10 mg at 06/25/24 0924    dextrose 10%  "bolus 125 mL 125 mL, 12.5 g, Intravenous, PRN, Fransisco Mora MD    dextrose 10% bolus 250 mL 250 mL, 25 g, Intravenous, PRN, Fransisco Mora MD    enoxaparin injection 40 mg, 40 mg, Subcutaneous, Daily, Fransisco Mora MD    glucagon (human recombinant) injection 1 mg, 1 mg, Intramuscular, PRN, Fransisco Mora MD    glucose chewable tablet 16 g, 16 g, Oral, PRN, Fransisco Mora MD    glucose chewable tablet 24 g, 24 g, Oral, PRN, Fransisco Mora MD    hydrALAZINE injection 10 mg, 10 mg, Intravenous, Q6H PRN, Tammy Granger MD    insulin aspart U-100 pen 0-5 Units, 0-5 Units, Subcutaneous, QID (AC + HS) PRN, Fransisco Mora MD    melatonin tablet 6 mg, 6 mg, Oral, Nightly PRN, Fransisco Mora MD    naloxone 0.4 mg/mL injection 0.02 mg, 0.02 mg, Intravenous, PRN, Fransisco Mora MD    ondansetron injection 4 mg, 4 mg, Intravenous, Q8H PRN, Fransisco Mora MD    polyethylene glycol packet 17 g, 17 g, Oral, Daily, Tammy Granger MD, 17 g at 06/24/24 0839    promethazine tablet 25 mg, 25 mg, Oral, Q6H PRN, Fransisco Mora MD    senna-docusate 8.6-50 mg per tablet 1 tablet, 1 tablet, Oral, BID PRN, Fransisco Mora MD, 1 tablet at 06/23/24 0920    senna-docusate 8.6-50 mg per tablet 1 tablet, 1 tablet, Oral, BID, Tammy Granger MD, 1 tablet at 06/25/24 0924    sodium chloride 0.9% flush 10 mL, 10 mL, Intravenous, Q12H PRN, Fransisco Mora MD         Objective       Physical Examination:   VS/Measurements   /76 (BP Location: Right arm, Patient Position: Lying)   Pulse 68   Temp 98.3 °F (36.8 °C) (Oral)   Resp 18   Ht 5' 3" (1.6 m)   Wt 79.1 kg (174 lb 6.1 oz)   SpO2 97%   BMI 30.89 kg/m²   Vitals:    06/25/24 1202   BP: 126/76   Pulse: 68   Resp: 18   Temp: 98.3 °F (36.8 °C)       UOP = not quantitated  Constitutional:       General: She is not in acute distress.     Appearance: She is not toxic-appearing.   HENT:      Head: Normocephalic and " atraumatic.      Nose: No congestion or rhinorrhea.   Eyes:      Extraocular Movements: Extraocular movements intact.      Conjunctiva/sclera: Conjunctivae normal.   Cardiovascular:      Rate and Rhythm: Normal rate and regular rhythm.      Heart sounds: No murmur heard.     No friction rub.   Pulmonary:      Effort: Pulmonary effort is normal. No respiratory distress.      Breath sounds: Normal breath sounds. No stridor.   Abdominal:      General: Abdomen is flat. Bowel sounds are normal. There is no distension.      Palpations: Abdomen is soft. There is no mass.   Musculoskeletal:         General: No swelling or tenderness.      Cervical back: Normal range of motion. No rigidity or tenderness.   Skin:     Coloration: Skin is not jaundiced or pale.   Neurological:      Mental Status: She is alert.      Cranial Nerves: No cranial nerve deficit.   Vitals reviewed.    Laboratory Results   Today's Lab Results :    Recent Results (from the past 24 hour(s))   POCT glucose    Collection Time: 06/24/24  5:20 PM   Result Value Ref Range    POCT Glucose 105 70 - 110 mg/dL   POCT glucose    Collection Time: 06/24/24  9:17 PM   Result Value Ref Range    POCT Glucose 85 70 - 110 mg/dL   Comprehensive Metabolic Panel (CMP)    Collection Time: 06/25/24  5:36 AM   Result Value Ref Range    Sodium 137 136 - 145 mmol/L    Potassium 3.7 3.5 - 5.1 mmol/L    Chloride 109 95 - 110 mmol/L    CO2 19 (L) 23 - 29 mmol/L    Glucose 79 70 - 110 mg/dL    BUN 13 6 - 20 mg/dL    Creatinine 1.2 0.5 - 1.4 mg/dL    Calcium 9.3 8.7 - 10.5 mg/dL    Total Protein 9.5 (H) 6.0 - 8.4 g/dL    Albumin 2.5 (L) 3.5 - 5.2 g/dL    Total Bilirubin 0.5 0.1 - 1.0 mg/dL    Alkaline Phosphatase 92 55 - 135 U/L    AST 31 10 - 40 U/L    ALT 19 10 - 44 U/L    eGFR 53 (A) >60 mL/min/1.73 m^2    Anion Gap 9 8 - 16 mmol/L   Renal Function Panel    Collection Time: 06/25/24  5:36 AM   Result Value Ref Range    Glucose 79 70 - 110 mg/dL    Sodium 137 136 - 145 mmol/L     Potassium 3.7 3.5 - 5.1 mmol/L    Chloride 109 95 - 110 mmol/L    CO2 19 (L) 23 - 29 mmol/L    BUN 13 6 - 20 mg/dL    Calcium 9.3 8.7 - 10.5 mg/dL    Creatinine 1.2 0.5 - 1.4 mg/dL    Albumin 2.5 (L) 3.5 - 5.2 g/dL    Phosphorus 2.7 2.7 - 4.5 mg/dL    eGFR 53 (A) >60 mL/min/1.73 m^2    Anion Gap 9 8 - 16 mmol/L   Albumin    Collection Time: 06/25/24  5:36 AM   Result Value Ref Range    Albumin 2.5 (L) 3.5 - 5.2 g/dL   Lactate dehydrogenase    Collection Time: 06/25/24  5:36 AM   Result Value Ref Range     110 - 260 U/L   Beta 2 Microglobulin, Serum    Collection Time: 06/25/24  5:36 AM   Result Value Ref Range    Beta-2 Microglobulin 6.8 (H) 0.0 - 2.5 ug/mL   CBC with Automated Differential    Collection Time: 06/25/24  5:37 AM   Result Value Ref Range    WBC 4.79 3.90 - 12.70 K/uL    RBC 4.23 4.00 - 5.40 M/uL    Hemoglobin 12.0 12.0 - 16.0 g/dL    Hematocrit 37.1 37.0 - 48.5 %    MCV 88 82 - 98 fL    MCH 28.4 27.0 - 31.0 pg    MCHC 32.3 32.0 - 36.0 g/dL    RDW 13.7 11.5 - 14.5 %    Platelets 318 150 - 450 K/uL    MPV 10.4 9.2 - 12.9 fL    Immature Granulocytes 0.4 0.0 - 0.5 %    Gran # (ANC) 2.6 1.8 - 7.7 K/uL    Immature Grans (Abs) 0.02 0.00 - 0.04 K/uL    Lymph # 1.1 1.0 - 4.8 K/uL    Mono # 0.7 0.3 - 1.0 K/uL    Eos # 0.4 0.0 - 0.5 K/uL    Baso # 0.05 0.00 - 0.20 K/uL    nRBC 0 0 /100 WBC    Gran % 54.0 38.0 - 73.0 %    Lymph % 22.3 18.0 - 48.0 %    Mono % 14.2 4.0 - 15.0 %    Eosinophil % 8.1 (H) 0.0 - 8.0 %    Basophil % 1.0 0.0 - 1.9 %    Differential Method Automated    POCT glucose    Collection Time: 06/25/24  5:52 AM   Result Value Ref Range    POCT Glucose 78 70 - 110 mg/dL   POCT glucose    Collection Time: 06/25/24 11:17 AM   Result Value Ref Range    POCT Glucose 97 70 - 110 mg/dL       @[facility]@ @[facility]@    Assessment & Plan   Active Hospital Problems    Diagnosis  POA    *Hypercalcemia [E83.52]  Yes    Hypertension [I10]  Yes    Class 1 obesity due to excess calories with body mass index (BMI)  of 30.0 to 30.9 in adult [E66.09, Z68.30]  Not Applicable    SHENA (acute kidney injury) [N17.9]  Yes    Vitamin D deficiency [E55.9]  Yes    Dyslipidemia [E78.5]  Yes    Type 2 diabetes mellitus [E11.9]  Yes      Resolved Hospital Problems   No resolved problems to display.       Assessment/Recommendations:      Hypercalcemia.  Resolved  - etiology unclear but differential diagnosis includes primary hyperparathyroidism, FHH,   - abnormal SPEP kappa/lambda light chains noted.  Hematology/Oncology following  - further workup depends upon initial lab results.  - continue IV fluids and follow laboratory studies     Nephrolithiasis.  Bilateral nonobstructing per ultrasound     SHENA / SHENA on CKD 3B  -  serum creatinine has been stable around 1.4-1.5 mg/dL.  Likely her baseline creatinine.  Monitor renal function closely.  Avoid NSAID use.     HTN  - continue amlodipine.  Agree with holding losartan and HCTZ.  - avoid Thiazide use in future due to hypercalcemia.  This medication needs to be discontinued at discharge.     Anemia of CKD.  Hemoglobin stable at 13 g.  Monitor.     Proteinuria.  Mild at 0.6 g per day.  Resume ARB  at the time of discharge if renal function stable.        -Portions of this note were created using voice recognition software.  It is possible that there are errors, which have persisted, despite proofreading.  If there is a question regarding contents of this document, please contact me for clarification.

## 2024-06-25 NOTE — ASSESSMENT & PLAN NOTE
Chronic, controlled. Latest blood pressure and vitals reviewed-     Temp:  [98 °F (36.7 °C)-99.5 °F (37.5 °C)]   Pulse:  [60-79]   Resp:  [16-18]   BP: (130-161)/(77-84)   SpO2:  [97 %-99 %] .   Home meds for hypertension were reviewed and noted below.   Hypertension Medications               amLODIPine (NORVASC) 5 MG tablet Take 10 mg by mouth.    losartan-hydrochlorothiazide 50-12.5 mg (HYZAAR) 50-12.5 mg per tablet Take 1 tablet by mouth once daily.     While in the hospital, will manage blood pressure as follows; Adjust home antihypertensive regimen as follows- continue amlodipine and hold Hyzaar given SHENA and hypercalcemia    Will utilize p.r.n. blood pressure medication only if patient's blood pressure greater than 180/110 and she develops symptoms such as worsening chest pain or shortness of breath.

## 2024-06-25 NOTE — ASSESSMENT & PLAN NOTE
Patient is not currently on statin.will recommend patient begin statin at time of hospital discharge. Last Lipid Panel:   Lab Results   Component Value Date    CHOL 204 (H) 06/03/2024    HDL 33 (L) 06/03/2024    LDLCALC 133.4 06/03/2024    TRIG 188 (H) 06/03/2024    CHOLHDL 16.2 (L) 06/03/2024   Plan:  -low fat/low calorie diet  The 10-year ASCVD risk score (Otilio HUTCHINS, et al., 2019) is: 36.8%    Values used to calculate the score:      Age: 54 years      Sex: Female      Is Non- : Yes      Diabetic: Yes      Tobacco smoker: No      Systolic Blood Pressure: 161 mmHg      Is BP treated: Yes      HDL Cholesterol: 33 mg/dL      Total Cholesterol: 204 mg/dL  Would benefit from statin  Defer to primary care provider

## 2024-06-25 NOTE — ASSESSMENT & PLAN NOTE
Patient's FSGs are controlled on current medication regimen.  Last A1c reviewed-   Lab Results   Component Value Date    HGBA1C 6.2 (H) 06/03/2024     Most recent fingerstick glucose reviewed-   Recent Labs   Lab 06/24/24  1200 06/24/24  1720 06/24/24 2117 06/25/24  0552   POCTGLUCOSE 93 105 85 78       Current correctional scale  Low  Titrate as needed  anti-hyperglycemic dose as follows-   Antihyperglycemics (From admission, onward)    Start     Stop Route Frequency Ordered    06/22/24 2232  insulin aspart U-100 pen 0-5 Units         -- SubQ Before meals & nightly PRN 06/22/24 2132      Plan:  Poor po intake   -SSI  -A1c 6.2  -Accu-checks  -Hold oral hypoglycemics while patient is in the hospital  -Hypoglycemic protocol

## 2024-06-25 NOTE — ASSESSMENT & PLAN NOTE
Patient with acute kidney injury/acute renal failure vs CKD. Etiology currently unknown but may be due to  progressive disease versus adverse side effect from medications.  SHENA is currently  undergoing medical management . Baseline creatinine  1.2-1.5  - Labs reviewed- Renal function/electrolytes with Estimated Creatinine Clearance: 52.1 mL/min (based on SCr of 1.2 mg/dL). according to latest data. Monitor urine output and serial BMP and adjust therapy as needed. Avoid nephrotoxins and renally dose meds for GFR listed above.  Patient currently follows Nephrology outpatient.  Nephrology consulted

## 2024-06-25 NOTE — ASSESSMENT & PLAN NOTE
"The patient has hypercalcemia that is currently uncontrolled. The patient has the following symptoms due to their hypercalcemia: weakness, fatigue, and constipation. The hypercalcemia is likely due to  unknown etiology currently but is noted to be on HCTZ as well as Vit D outpatient . We will obtain the following labs to work up the hypercalcemia:  currently underway . We will treat the hypercalcemia with: IV fluids ordered at a rate of 100 ml/hr.  Patient also started on stool softeners.  Home Hyzaar and vitamin-D currently on hold.  Patient needing PTH and vitamin-D level.  Their latest calcium has been reviewed and is listed below.  No results found for: "CAION"  Minimal improvement with fluids  Increase rate of fluids  Nephrology consulted   Abnormal serum protein and free light chain ratio  Pth within normal limits   Hem/onc consulted with follow up outpatient   "

## 2024-06-25 NOTE — ASSESSMENT & PLAN NOTE
I have reviewed records patient has elevated free light chain ratio in elevated gammaglobulin awaiting for interpretation by pathologist serum protein electrophoresis I am very concerned that this patient most likely has multiple myeloma.  I have talked to the patient about the diagnosis I would recommend that the patient have a bone marrow biopsy and a PET scan be done as outpatient.  Diagnosis for the next several weeks.  Will ask our navigation team coordinate patient is discharged in follow-up patient history of multiple she is years ago talked to her fact that this is dramatically different at this time.  Assuming diagnosis is correct  06/25/2024 discussed situation again with patient at this time I am concerned about the possibility of ultimate myeloma with elevated gammaglobulin on serum protein electrophoresis we do not have interpretation as of yet she does have an elevated free light chain ratio.  Again I would recommend proceeding with a PET scan as soon as possible.  In a CT-directed bone marrow biopsy with plasma cell proliferative index.  Discussed implications of answered questions with her about the nature of hypercalcemia in the spectrum from monoclonal gammopathy of undetermined significance to lyle multiple myeloma.  In various treatment options non treatment options based off of determination if this is correct diagnosis

## 2024-06-25 NOTE — TELEPHONE ENCOUNTER
----- Message from Marco Alford MD sent at 6/25/2024  8:30 AM CDT -----  If the patient is discharged today and we can get this approved by her insurance perhaps tomorrow for the PET  ----- Message -----  From: Briana Vu DO  Sent: 6/24/2024   4:20 PM CDT  To: Marco Alford MD; [facility] Staff    I have a relatively open schedule. I can see her Wednesday. I have Cc'd my staff  ----- Message -----  From: Marco Alford MD  Sent: 6/24/2024   4:07 PM CDT  To: Briana Vu DO; [facility]    Navigation team the patient I saw in the hospital with hypercalcemia.  I am concerned that the patient has multiple myeloma I have placed orders for PET scan CT bone marrow biopsy.  She should be being discharged over the next 24 hours I would like to see if we get you scheduled to soon as possible she is aware that we will be doing this

## 2024-06-25 NOTE — SUBJECTIVE & OBJECTIVE
Interval History: See hospital course for today      Review of Systems   Constitutional:  Positive for appetite change (improving) and fatigue (improving). Negative for activity change and fever.   Respiratory:  Positive for shortness of breath (improving).    Cardiovascular:  Negative for chest pain and leg swelling.   Gastrointestinal:  Positive for constipation (improving). Negative for abdominal pain, nausea and vomiting.   Genitourinary:  Positive for frequency.   Neurological:  Positive for weakness.   Psychiatric/Behavioral:  Positive for decreased concentration (improving).      Objective:     Vital Signs (Most Recent):  Temp: 98.3 °F (36.8 °C) (06/25/24 0700)  Pulse: 74 (06/25/24 0904)  Resp: 16 (06/25/24 0700)  BP: (!) 161/79 (06/25/24 0700)  SpO2: 99 % (06/25/24 0700) Vital Signs (24h Range):  Temp:  [98 °F (36.7 °C)-99.5 °F (37.5 °C)] 98.3 °F (36.8 °C)  Pulse:  [60-79] 74  Resp:  [16-18] 16  SpO2:  [97 %-99 %] 99 %  BP: (130-161)/(77-84) 161/79     Weight: 79.1 kg (174 lb 6.1 oz)  Body mass index is 30.89 kg/m².    Intake/Output Summary (Last 24 hours) at 6/25/2024 0958  Last data filed at 6/25/2024 0640  Gross per 24 hour   Intake 2874.5 ml   Output 2 ml   Net 2872.5 ml         Physical Exam  Vitals and nursing note reviewed. Exam conducted with a chaperone present (nursing).   Constitutional:       General: She is not in acute distress.     Appearance: She is not toxic-appearing.   HENT:      Head: Normocephalic and atraumatic.   Cardiovascular:      Rate and Rhythm: Normal rate.   Pulmonary:      Effort: Pulmonary effort is normal. No respiratory distress.   Abdominal:      Palpations: Abdomen is soft.   Musculoskeletal:      Right lower leg: No edema.      Left lower leg: No edema.   Skin:     General: Skin is warm.   Neurological:      Mental Status: She is alert and oriented to person, place, and time.   Psychiatric:         Mood and Affect: Mood normal.         Behavior: Behavior normal.              Significant Labs: All pertinent labs within the past 24 hours have been reviewed.  CBC:   Recent Labs   Lab 06/24/24  0440 06/25/24  0537   WBC 5.18 4.79   HGB 12.5 12.0   HCT 38.5 37.1    318     CMP:   Recent Labs   Lab 06/24/24  0440 06/25/24  0536    137  137   K 4.0 3.7  3.7    109  109   CO2 19* 19*  19*   GLU 81 79  79   BUN 16 13  13   CREATININE 1.4 1.2  1.2   CALCIUM 10.5 9.3  9.3   PROT 9.4* 9.5*   ALBUMIN 2.6* 2.5*  2.5*  2.5*   BILITOT 0.6 0.5   ALKPHOS 85 92   AST 23 31   ALT 15 19   ANIONGAP 12 9  9       Significant Imaging: I have reviewed all pertinent imaging results/findings within the past 24 hours.

## 2024-06-26 ENCOUNTER — TELEPHONE (OUTPATIENT)
Dept: HEMATOLOGY/ONCOLOGY | Facility: CLINIC | Age: 54
End: 2024-06-26
Payer: COMMERCIAL

## 2024-06-26 VITALS
WEIGHT: 179.25 LBS | TEMPERATURE: 98 F | SYSTOLIC BLOOD PRESSURE: 135 MMHG | BODY MASS INDEX: 31.76 KG/M2 | HEIGHT: 63 IN | OXYGEN SATURATION: 98 % | DIASTOLIC BLOOD PRESSURE: 76 MMHG | HEART RATE: 61 BPM | RESPIRATION RATE: 18 BRPM

## 2024-06-26 DIAGNOSIS — E11.9 TYPE 2 DIABETES MELLITUS WITHOUT COMPLICATION: ICD-10-CM

## 2024-06-26 PROBLEM — N17.9 AKI (ACUTE KIDNEY INJURY): Status: RESOLVED | Noted: 2024-06-23 | Resolved: 2024-06-26

## 2024-06-26 PROBLEM — E83.52 HYPERCALCEMIA: Status: RESOLVED | Noted: 2024-06-22 | Resolved: 2024-06-26

## 2024-06-26 LAB
ALBUMIN SERPL BCP-MCNC: 2.4 G/DL (ref 3.5–5.2)
ANION GAP SERPL CALC-SCNC: 11 MMOL/L (ref 8–16)
BUN SERPL-MCNC: 13 MG/DL (ref 6–20)
CALCIUM SERPL-MCNC: 9.2 MG/DL (ref 8.7–10.5)
CHLORIDE SERPL-SCNC: 109 MMOL/L (ref 95–110)
CO2 SERPL-SCNC: 17 MMOL/L (ref 23–29)
CREAT SERPL-MCNC: 1.1 MG/DL (ref 0.5–1.4)
EST. GFR  (NO RACE VARIABLE): 59 ML/MIN/1.73 M^2
GLUCOSE SERPL-MCNC: 76 MG/DL (ref 70–110)
INTERPRETATION SERPL IFE-IMP: NORMAL
PATHOLOGIST INTERPRETATION IFE: NORMAL
PHOSPHATE SERPL-MCNC: 2.9 MG/DL (ref 2.7–4.5)
POCT GLUCOSE: 78 MG/DL (ref 70–110)
POTASSIUM SERPL-SCNC: 3.7 MMOL/L (ref 3.5–5.1)
SODIUM SERPL-SCNC: 137 MMOL/L (ref 136–145)

## 2024-06-26 PROCEDURE — 36415 COLL VENOUS BLD VENIPUNCTURE: CPT | Performed by: INTERNAL MEDICINE

## 2024-06-26 PROCEDURE — 25000003 PHARM REV CODE 250: Performed by: FAMILY MEDICINE

## 2024-06-26 PROCEDURE — 99232 SBSQ HOSP IP/OBS MODERATE 35: CPT | Mod: ,,, | Performed by: INTERNAL MEDICINE

## 2024-06-26 PROCEDURE — 25000003 PHARM REV CODE 250: Performed by: HOSPITALIST

## 2024-06-26 PROCEDURE — 80069 RENAL FUNCTION PANEL: CPT | Performed by: INTERNAL MEDICINE

## 2024-06-26 RX ORDER — AMLODIPINE BESYLATE 10 MG/1
10 TABLET ORAL DAILY
Qty: 30 TABLET | Refills: 0 | Status: SHIPPED | OUTPATIENT
Start: 2024-06-27 | End: 2024-07-27

## 2024-06-26 RX ADMIN — SODIUM CHLORIDE: 9 INJECTION, SOLUTION INTRAVENOUS at 01:06

## 2024-06-26 RX ADMIN — DOCUSATE SODIUM AND SENNOSIDES 1 TABLET: 8.6; 5 TABLET, FILM COATED ORAL at 08:06

## 2024-06-26 RX ADMIN — AMLODIPINE BESYLATE 10 MG: 10 TABLET ORAL at 08:06

## 2024-06-26 NOTE — TELEPHONE ENCOUNTER
----- Message from Kahlil Cedeno sent at 6/26/2024 12:25 PM CDT -----  Good Afternoon , patient is scheduled for a PET Scan, and insurance is still pending approval, and we need to allow time for insurance approval, is the test medically urgent? if not medically urgent we may need to re-schedule to allow Insurance time to approve test. If you have any questions call ext [phone].  Thanks     Radiology

## 2024-06-26 NOTE — PLAN OF CARE
A251/A251 CLYDEMeg Herrera is a 54 y.o.female admitted on 6/22/2024 for Hypercalcemia   Code Status: Full Code MRN: [id number]   Review of patient's allergies indicates:   Allergen Reactions    Tylox [oxycodone-acetaminophen]      Past Medical History:   Diagnosis Date    Calculus of kidney 4/9/2014 2:20:53 PM    [facility] - Urology: KidneyStones-No Additional Notes    Complications affecting other specified body systems, hypertension 3/26/2014 1:38:44 PM    [facility] - Cardiovascular: Hypertension-No Additional Notes    Excessive or frequent menstruation 3/20/2014 9:22:15 AM    [facility] - [facility]: Bleeding, Menorrhagia-No Additional Notes    Hypertension     Leiomyoma of uterus 3/20/2014 9:23:21 AM    [facility] - [facility]: Fibroids/Leiomyoma of Uterus-2.1x1.8cm (POST/LT); 3.9x3.5cm (ANT TO ENDO)      PRN meds    acetaminophen, 650 mg, Q6H PRN  acetaminophen, 650 mg, Q8H PRN  albuterol-ipratropium, 3 mL, Q6H PRN  aluminum-magnesium hydroxide-simethicone, 30 mL, QID PRN  dextrose 10%, 12.5 g, PRN  dextrose 10%, 25 g, PRN  glucagon (human recombinant), 1 mg, PRN  glucose, 16 g, PRN  glucose, 24 g, PRN  hydrALAZINE, 10 mg, Q6H PRN  insulin aspart U-100, 0-5 Units, QID (AC + HS) PRN  melatonin, 6 mg, Nightly PRN  naloxone, 0.02 mg, PRN  ondansetron, 4 mg, Q8H PRN  promethazine, 25 mg, Q6H PRN  senna-docusate 8.6-50 mg, 1 tablet, BID PRN  sodium chloride 0.9%, 10 mL, Q12H PRN      Chart check completed. Will continue plan of care.      Orientation: oriented x 4        Lead Monitored: Lead II Rhythm: normal sinus rhythm    Cardiac/Telemetry Box Number: [id number]  VTE Required Core Measure: Patient refused interventions Last Bowel Movement: 06/24/24  Diet diabetic Low Sodium,2gm; 2000 Calorie     Matheus Score: 21  Fall Risk Score: 6  Accucheck [x]   Freq? q6hr     Lines/Drains/Airways       Peripheral Intravenous Line  Duration                  Peripheral IV - Single Lumen  06/22/24 2041 20 G Left Antecubital 2 days

## 2024-06-26 NOTE — PLAN OF CARE
Problem: Adult Inpatient Plan of Care  Goal: Plan of Care Review  6/26/2024 1014 by Jani Rodriguez RN  Outcome: Met  6/26/2024 1014 by Jani Rodriguez RN  Outcome: Met  Goal: Patient-Specific Goal (Individualized)  6/26/2024 1014 by Jani Rodriguez RN  Outcome: Met  6/26/2024 1014 by Jani Rodriguez RN  Outcome: Met  Goal: Absence of Hospital-Acquired Illness or Injury  6/26/2024 1014 by Jani Rodriguez RN  Outcome: Met  6/26/2024 1014 by Jani Rodriguez RN  Outcome: Met  Goal: Optimal Comfort and Wellbeing  6/26/2024 1014 by Jani Rodriguez RN  Outcome: Met  6/26/2024 1014 by Jani Rodriguez RN  Outcome: Met  Goal: Readiness for Transition of Care  6/26/2024 1014 by Jani Rodriguez RN  Outcome: Met  6/26/2024 1014 by Jani Rodriguez RN  Outcome: Met     Problem: Diabetes Comorbidity  Goal: Blood Glucose Level Within Targeted Range  6/26/2024 1014 by Jani Rodriguez RN  Outcome: Met  6/26/2024 1014 by Jani Rodriguez RN  Outcome: Met     Problem: Acute Kidney Injury/Impairment  Goal: Fluid and Electrolyte Balance  6/26/2024 1014 by Jani Rodriguez RN  Outcome: Met  6/26/2024 1014 by Jani Rodriguez RN  Outcome: Met  Goal: Improved Oral Intake  6/26/2024 1014 by Jani Rodriguez RN  Outcome: Met  6/26/2024 1014 by Jani Rodriguez RN  Outcome: Met  Goal: Effective Renal Function  6/26/2024 1014 by Jani Rodriguez RN  Outcome: Met  6/26/2024 1014 by Jani Rodriguez RN  Outcome: Met     Problem: Fall Injury Risk  Goal: Absence of Fall and Fall-Related Injury  6/26/2024 1014 by Jani Rodriguez RN  Outcome: Met  6/26/2024 1014 by Jani Rodriguez RN  Outcome: Met

## 2024-06-26 NOTE — SUBJECTIVE & OBJECTIVE
Interval History:  Patient is resting comfortably discuss results of serum protein electrophoresis    Oncology Treatment Plan:   [No matching plan found]    Medications:  Continuous Infusions:   0.9% NaCl   Intravenous Continuous 125 mL/hr at 06/26/24 0147 New Bag at 06/26/24 0147     Scheduled Meds:   amLODIPine  10 mg Oral Daily    enoxparin  40 mg Subcutaneous Daily    polyethylene glycol  17 g Oral Daily    senna-docusate 8.6-50 mg  1 tablet Oral BID     PRN Meds:  Current Facility-Administered Medications:     acetaminophen, 650 mg, Rectal, Q6H PRN    acetaminophen, 650 mg, Oral, Q8H PRN    albuterol-ipratropium, 3 mL, Nebulization, Q6H PRN    aluminum-magnesium hydroxide-simethicone, 30 mL, Oral, QID PRN    dextrose 10%, 12.5 g, Intravenous, PRN    dextrose 10%, 25 g, Intravenous, PRN    glucagon (human recombinant), 1 mg, Intramuscular, PRN    glucose, 16 g, Oral, PRN    glucose, 24 g, Oral, PRN    hydrALAZINE, 10 mg, Intravenous, Q6H PRN    insulin aspart U-100, 0-5 Units, Subcutaneous, QID (AC + HS) PRN    melatonin, 6 mg, Oral, Nightly PRN    naloxone, 0.02 mg, Intravenous, PRN    ondansetron, 4 mg, Intravenous, Q8H PRN    promethazine, 25 mg, Oral, Q6H PRN    senna-docusate 8.6-50 mg, 1 tablet, Oral, BID PRN    sodium chloride 0.9%, 10 mL, Intravenous, Q12H PRN     Review of Systems   Constitutional:  Negative for activity change, appetite change, chills, diaphoresis, fatigue, fever and unexpected weight change.   HENT:  Negative for congestion, dental problem, drooling, ear discharge, ear pain, facial swelling, hearing loss, mouth sores, nosebleeds, postnasal drip, rhinorrhea, sinus pressure, sneezing, sore throat, tinnitus, trouble swallowing and voice change.    Eyes:  Negative for photophobia, pain, discharge, redness, itching and visual disturbance.   Respiratory:  Negative for cough, choking, chest tightness, shortness of breath, wheezing and stridor.    Cardiovascular:  Negative for chest pain,  palpitations and leg swelling.   Gastrointestinal:  Negative for abdominal distention, abdominal pain, anal bleeding, blood in stool, constipation, diarrhea, nausea, rectal pain and vomiting.   Endocrine: Negative for cold intolerance, heat intolerance, polydipsia, polyphagia and polyuria.   Genitourinary:  Negative for decreased urine volume, difficulty urinating, dyspareunia, dysuria, enuresis, flank pain, frequency, genital sores, hematuria, menstrual problem, pelvic pain, urgency, vaginal bleeding, vaginal discharge and vaginal pain.   Musculoskeletal:  Negative for arthralgias, back pain, gait problem, joint swelling, myalgias, neck pain and neck stiffness.   Skin:  Negative for color change, pallor and rash.   Allergic/Immunologic: Negative for environmental allergies, food allergies and immunocompromised state.   Neurological:  Negative for dizziness, tremors, seizures, syncope, facial asymmetry, speech difficulty, weakness, light-headedness, numbness and headaches.   Hematological:  Negative for adenopathy. Does not bruise/bleed easily.   Psychiatric/Behavioral:  Positive for dysphoric mood. Negative for agitation, behavioral problems, confusion, decreased concentration, hallucinations, self-injury, sleep disturbance and suicidal ideas. The patient is nervous/anxious. The patient is not hyperactive.      Objective:     Vital Signs (Most Recent):  Temp: 98.9 °F (37.2 °C) (06/26/24 0500)  Pulse: 61 (06/26/24 0500)  Resp: 18 (06/26/24 0500)  BP: 131/74 (06/26/24 0500)  SpO2: 97 % (06/26/24 0500) Vital Signs (24h Range):  Temp:  [98.3 °F (36.8 °C)-99 °F (37.2 °C)] 98.9 °F (37.2 °C)  Pulse:  [60-74] 61  Resp:  [16-20] 18  SpO2:  [97 %-99 %] 97 %  BP: (126-161)/(74-79) 131/74     Weight: 81.3 kg (179 lb 3.7 oz)  Body mass index is 31.75 kg/m².  Body surface area is 1.9 meters squared.      Intake/Output Summary (Last 24 hours) at 6/26/2024 0631  Last data filed at 6/25/2024 1833  Gross per 24 hour   Intake 3174.5  ml   Output 900 ml   Net 2274.5 ml        Physical Exam  Vitals reviewed.   Constitutional:       General: She is not in acute distress.     Appearance: She is well-developed. She is not diaphoretic.   HENT:      Head: Normocephalic and atraumatic.      Right Ear: External ear normal.      Left Ear: External ear normal.      Nose: Nose normal.      Right Sinus: No maxillary sinus tenderness or frontal sinus tenderness.      Left Sinus: No maxillary sinus tenderness or frontal sinus tenderness.      Mouth/Throat:      Pharynx: No oropharyngeal exudate.   Eyes:      General: Lids are normal. No scleral icterus.        Right eye: No discharge.         Left eye: No discharge.      Conjunctiva/sclera: Conjunctivae normal.      Right eye: Right conjunctiva is not injected. No hemorrhage.     Left eye: Left conjunctiva is not injected. No hemorrhage.     Pupils: Pupils are equal, round, and reactive to light.   Neck:      Thyroid: No thyromegaly.      Vascular: No JVD.      Trachea: No tracheal deviation.   Cardiovascular:      Rate and Rhythm: Normal rate.   Pulmonary:      Effort: Pulmonary effort is normal. No respiratory distress.      Breath sounds: No stridor.   Chest:      Chest wall: No tenderness.   Abdominal:      General: Bowel sounds are normal. There is no distension.      Palpations: Abdomen is soft. There is no hepatomegaly, splenomegaly or mass.      Tenderness: There is no abdominal tenderness. There is no rebound.   Musculoskeletal:         General: No tenderness. Normal range of motion.      Cervical back: Normal range of motion and neck supple.   Lymphadenopathy:      Cervical: No cervical adenopathy.      Upper Body:      Right upper body: No supraclavicular adenopathy.      Left upper body: No supraclavicular adenopathy.   Skin:     General: Skin is dry.      Findings: No erythema or rash.   Neurological:      Mental Status: She is alert and oriented to person, place, and time.      Cranial Nerves: No  "cranial nerve deficit.      Coordination: Coordination normal.   Psychiatric:         Behavior: Behavior normal.         Thought Content: Thought content normal.         Judgment: Judgment normal.          Significant Labs:   BMP:   Recent Labs   Lab 06/25/24  0536 06/26/24 0432   GLU 79  79 76     137 137   K 3.7  3.7 3.7     109 109   CO2 19*  19* 17*   BUN 13  13 13   CREATININE 1.2  1.2 1.1   CALCIUM 9.3  9.3 9.2   , CBC:   Recent Labs   Lab 06/25/24  0537   WBC 4.79   HGB 12.0   HCT 37.1      , CMP:   Recent Labs   Lab 06/25/24  0536 06/26/24 0432     137 137   K 3.7  3.7 3.7     109 109   CO2 19*  19* 17*   GLU 79  79 76   BUN 13  13 13   CREATININE 1.2  1.2 1.1   CALCIUM 9.3  9.3 9.2   PROT 9.5*  --    ALBUMIN 2.5*  2.5*  2.5* 2.4*   BILITOT 0.5  --    ALKPHOS 92  --    AST 31  --    ALT 19  --    ANIONGAP 9  9 11   , Coagulation: No results for input(s): "PT", "INR", "APTT" in the last 48 hours., Haptoglobin: No results for input(s): "HAPTOGLOBIN" in the last 48 hours., Immunology: No results for input(s): "SPEP", "WILL", "RAMYA", "FREELAMBDALI" in the last 48 hours., LDH: No results for input(s): "LDHCSF", "BFSOURCE" in the last 48 hours., LFTs:   Recent Labs   Lab 06/25/24  0536 06/26/24 0432   ALT 19  --    AST 31  --    ALKPHOS 92  --    BILITOT 0.5  --    PROT 9.5*  --    ALBUMIN 2.5*  2.5*  2.5* 2.4*   , Reticulocytes: No results for input(s): "RETIC" in the last 48 hours., Tumor Markers: No results for input(s): "PSA", "CEA", "", "AFPTM", "AU3227", "" in the last 48 hours.    Invalid input(s): "ALGTM", Uric Acid No results for input(s): "URICACID" in the last 48 hours., and Urine Studies: No results for input(s): "COLORU", "APPEARANCEUA", "PHUR", "SPECGRAV", "PROTEINUA", "GLUCUA", "KETONESU", "BILIRUBINUA", "OCCULTUA", "NITRITE", "UROBILINOGEN", "LEUKOCYTESUR", "RBCUA", "WBCUA", "BACTERIA", "SQUAMEPITHEL", "HYALINECASTS" in the last 48 " "hours.    Invalid input(s): "MARIA ESUR"    Diagnostic Results:  I have reviewed all pertinent imaging results/findings within the past 24 hours.  "

## 2024-06-26 NOTE — TELEPHONE ENCOUNTER
Spoke to Flo ALBERT With [facility] dept. Pt's insurance is still pending Auth. PET was rescheduled to 07/08/024 @ 1:30 pm. Patient was called and notified.

## 2024-06-26 NOTE — DISCHARGE SUMMARY
'[facility]  Discharge Summary      Patient Name: Rosibel Herrera  MRN: [id number]  EMRE: [id number]  Patient Class: IP- Inpatient  Admission Date: 6/22/2024  Hospital Length of Stay: 3 days  Discharge Date and Time:  06/26/2024 10:13 AM  Attending Physician: Tammy Granger MD   Discharging Provider: Tammy Granger MD  Primary Care Provider: Briana Vu DO    Primary Care Team: Networked reference to record PCT     HPI:   Rosibel Herrera is a 54 y.o. female with a PMH  has a past medical history of Calculus of kidney (4/9/2014 2:20:53 PM), Complications affecting other specified body systems, hypertension (3/26/2014 1:38:44 PM), Excessive or frequent menstruation (3/20/2014 9:22:15 AM), Hypertension, and Leiomyoma of uterus (3/20/2014 9:23:21 AM). who presented to the ED directed by primary nephrologist for abnormal lab findings consist of hypercalcemia.  Patient presented with persistent complaints of fatigue, nausea, and constipation x2 weeks duration and was found to have hypercalcemia measuring 12.1 with corrected calcium measuring 13.1.  Patient denied endorsing any other symptoms and reported being in her usual state of health with no other concerns or complaints.  Nephrology was consulted by ED staff to obtain further recommendations and recommended patient be initiated on normal saline at 100 cc/hour with repeat labs in the morning.  Patient admitted to Hospital Medicine under observation for continued medical management.    PCP: Briana Vu    * No surgery found *      Hospital Course:   6/23 admitted for hypercalcemia. Instructed to come to emergency department due to labs. Unclear etiology. Referral to nephrology by primary care provider due to abnormal kidney function. States urinary frequency, frothy in character. Associated with confusion, nausea, vomiting, fatigue, reduced appetite. Nephrology consulted. Increase fluids  6/24 hypercalcemia  persists. Continue intravenous fluids. Nephrology recommending hem/onc consult due to abnormal kappa/lambda light chains. Patient reports mild confusion, kidney stones but denies bone pain or abdominal pain.  6/25 hem/onc consulted for abnormal free light chain test. Recommendations reviewed with outpatient further workup. Continue fluids. Hypercalcemia improving.    6/26  Hypercalcemia resolved. After discussing with hem/onc, ok to discharge home with close follow up with hem/onc for follow up imaging and bone marrow biopsy for concerns of multiple myeloma. Nephrology recommending holding arb until follow up with primary care provider for monitoring kidney function.    No acute distress. No respiratory distress. On room air. Soft abdomen, nontender. No lower extremity edema. AO3    Patient seen and evaluated by me. Patient was determined to be suitable for discharge. Patient deemed stable for discharge to home with nurse practitioner to visit home program.        Goals of Care Treatment Preferences:  Code Status: Full Code      Consults:   Consults (From admission, onward)          Status Ordering Provider     Inpatient consult to Hematology/Oncology  Once        Provider:  Marco Alford MD    Acknowledged OPAL LITTLE     Inpatient consult to Nephrology  Once        Provider:  Oneil Otero MD    Acknowledged OPAL LITTLE     Inpatient consult to Registered Dietitian/Nutritionist  Once        Provider:  (Not yet assigned)    Completed RICARDO DOWLING            No new Assessment & Plan notes have been filed under this hospital service since the last note was generated.  Service: Hospital Medicine    Final Active Diagnoses:    Diagnosis Date Noted POA    Hypertension [I10] 06/23/2024 Yes    Class 1 obesity due to excess calories with body mass index (BMI) of 30.0 to 30.9 in adult [E66.09, Z68.30] 06/23/2024 Not Applicable    Vitamin D deficiency [E55.9] 02/04/2024 Yes    Dyslipidemia [E78.5]  01/05/2023 Yes    Type 2 diabetes mellitus [E11.9] 10/18/2021 Yes      Problems Resolved During this Admission:    Diagnosis Date Noted Date Resolved POA    PRINCIPAL PROBLEM:  Hypercalcemia [E83.52] 06/22/2024 06/26/2024 Yes    SHENA (acute kidney injury) [N17.9] 06/23/2024 06/26/2024 Yes       Discharged Condition: stable    Disposition:     Follow Up:   Follow-up Information       Briana Vu DO. Schedule an appointment as soon as possible for a visit in 3 day(s).    Specialty: Internal Medicine  Why: hospital follow up  Contact information:  [address] [facility] [address]  [phone]               Marco Alford MD. Schedule an appointment as soon as possible for a visit in 1 week(s).    Specialty: Hematology and Oncology  Why: hospital follow up  Contact information:  [address] [place] [address]  [phone]                           Patient Instructions:      Ambulatory referral/consult to [facility]     Diet Cardiac     Diet diabetic     Activity as tolerated       Significant Diagnostic Studies: Labs: CMP   Recent Labs   Lab 06/25/24  0536 06/26/24  0432     137 137   K 3.7  3.7 3.7     109 109   CO2 19*  19* 17*   GLU 79  79 76   BUN 13  13 13   CREATININE 1.2  1.2 1.1   CALCIUM 9.3  9.3 9.2   PROT 9.5*  --    ALBUMIN 2.5*  2.5*  2.5* 2.4*   BILITOT 0.5  --    ALKPHOS 92  --    AST 31  --    ALT 19  --    ANIONGAP 9  9 11   , CBC   Recent Labs   Lab 06/25/24  0537   WBC 4.79   HGB 12.0   HCT 37.1      , A1C:   Recent Labs   Lab 06/03/24  1252   HGBA1C 6.2*   , All labs within the past 24 hours have been reviewed, and pth within normal limits; beta 2 microglobulin 6.8; ldh 153  Radiology: Ultrasound: bilateral nonobstructing stones    Pending Diagnostic Studies:       Procedure Component Value Units Date/Time    PTH-related peptide [[id number]] Collected: 06/23/24 1214    Order Status: Sent Lab Status: In process Updated:  06/23/24 1905    Specimen: Blood     Protein electrophoresis, timed urine [[id number]] Collected: 06/25/24 1832    Order Status: Sent Lab Status: In process Updated: 06/26/24 0117    Specimen: Urine, Clean Catch            Medications:  Reconciled Home Medications:      Medication List        CHANGE how you take these medications      amLODIPine 10 MG tablet  Commonly known as: NORVASC  Take 1 tablet (10 mg total) by mouth once daily.  Start taking on: June 27, 2024  What changed:   medication strength  when to take this            CONTINUE taking these medications      azelastine 137 mcg (0.1 %) nasal spray  Commonly known as: ASTELIN  2 sprays by Nasal route 2 (two) times daily.     fluticasone propionate 50 mcg/actuation nasal spray  Commonly known as: FLONASE  by Each Nostril route daily as needed.     metFORMIN 500 MG tablet  Commonly known as: GLUCOPHAGE  Take 500 mg by mouth 2 (two) times daily with meals.     montelukast 10 mg tablet  Commonly known as: SINGULAIR  Take 10 mg by mouth once daily.            STOP taking these medications      cholecalciferol (vitamin D3) 1,250 mcg (50,000 unit) capsule     doxycycline 100 MG capsule  Commonly known as: MONODOX     losartan-hydrochlorothiazide 50-12.5 mg 50-12.5 mg per tablet  Commonly known as: HYZAAR              Indwelling Lines/Drains at time of discharge:   Lines/Drains/Airways       None                   Time spent on the discharge of patient: 39 minutes         Tammy Granger MD  Department of Hospital Medicine  '[place] - Telemetry ([facility])

## 2024-06-26 NOTE — PROGRESS NOTES
[facility] Tele[facility] ([facility])  Hematology/Oncology  Progress Note    Patient Name: Rosibel Herrera  Admission Date: 6/22/2024  Hospital Length of Stay: 3 days  Code Status: Full Code     Subjective:     HPI:  54-year-old female admitted to the hospital with hypercalcemia.  I was asked to see the patient after elevated free light chain ratio in increased gammaglobulin level would noted.  For possible multiple myeloma.  ECOG status 1    Interval History:  Patient is resting comfortably discuss results of serum protein electrophoresis    Oncology Treatment Plan:   [No matching plan found]    Medications:  Continuous Infusions:   0.9% NaCl   Intravenous Continuous 125 mL/hr at 06/26/24 0147 New Bag at 06/26/24 0147     Scheduled Meds:   amLODIPine  10 mg Oral Daily    enoxparin  40 mg Subcutaneous Daily    polyethylene glycol  17 g Oral Daily    senna-docusate 8.6-50 mg  1 tablet Oral BID     PRN Meds:  Current Facility-Administered Medications:     acetaminophen, 650 mg, Rectal, Q6H PRN    acetaminophen, 650 mg, Oral, Q8H PRN    albuterol-ipratropium, 3 mL, Nebulization, Q6H PRN    aluminum-magnesium hydroxide-simethicone, 30 mL, Oral, QID PRN    dextrose 10%, 12.5 g, Intravenous, PRN    dextrose 10%, 25 g, Intravenous, PRN    glucagon (human recombinant), 1 mg, Intramuscular, PRN    glucose, 16 g, Oral, PRN    glucose, 24 g, Oral, PRN    hydrALAZINE, 10 mg, Intravenous, Q6H PRN    insulin aspart U-100, 0-5 Units, Subcutaneous, QID (AC + HS) PRN    melatonin, 6 mg, Oral, Nightly PRN    naloxone, 0.02 mg, Intravenous, PRN    ondansetron, 4 mg, Intravenous, Q8H PRN    promethazine, 25 mg, Oral, Q6H PRN    senna-docusate 8.6-50 mg, 1 tablet, Oral, BID PRN    sodium chloride 0.9%, 10 mL, Intravenous, Q12H PRN     Review of Systems   Constitutional:  Negative for activity change, appetite change, chills, diaphoresis, fatigue, fever and unexpected weight change.   HENT:  Negative for congestion, dental problem, drooling,  ear discharge, ear pain, facial swelling, hearing loss, mouth sores, nosebleeds, postnasal drip, rhinorrhea, sinus pressure, sneezing, sore throat, tinnitus, trouble swallowing and voice change.    Eyes:  Negative for photophobia, pain, discharge, redness, itching and visual disturbance.   Respiratory:  Negative for cough, choking, chest tightness, shortness of breath, wheezing and stridor.    Cardiovascular:  Negative for chest pain, palpitations and leg swelling.   Gastrointestinal:  Negative for abdominal distention, abdominal pain, anal bleeding, blood in stool, constipation, diarrhea, nausea, rectal pain and vomiting.   Endocrine: Negative for cold intolerance, heat intolerance, polydipsia, polyphagia and polyuria.   Genitourinary:  Negative for decreased urine volume, difficulty urinating, dyspareunia, dysuria, enuresis, flank pain, frequency, genital sores, hematuria, menstrual problem, pelvic pain, urgency, vaginal bleeding, vaginal discharge and vaginal pain.   Musculoskeletal:  Negative for arthralgias, back pain, gait problem, joint swelling, myalgias, neck pain and neck stiffness.   Skin:  Negative for color change, pallor and rash.   Allergic/Immunologic: Negative for environmental allergies, food allergies and immunocompromised state.   Neurological:  Negative for dizziness, tremors, seizures, syncope, facial asymmetry, speech difficulty, weakness, light-headedness, numbness and headaches.   Hematological:  Negative for adenopathy. Does not bruise/bleed easily.   Psychiatric/Behavioral:  Positive for dysphoric mood. Negative for agitation, behavioral problems, confusion, decreased concentration, hallucinations, self-injury, sleep disturbance and suicidal ideas. The patient is nervous/anxious. The patient is not hyperactive.      Objective:     Vital Signs (Most Recent):  Temp: 98.9 °F (37.2 °C) (06/26/24 0500)  Pulse: 61 (06/26/24 0500)  Resp: 18 (06/26/24 0500)  BP: 131/74 (06/26/24 0500)  SpO2: 97 %  (06/26/24 0500) Vital Signs (24h Range):  Temp:  [98.3 °F (36.8 °C)-99 °F (37.2 °C)] 98.9 °F (37.2 °C)  Pulse:  [60-74] 61  Resp:  [16-20] 18  SpO2:  [97 %-99 %] 97 %  BP: (126-161)/(74-79) 131/74     Weight: 81.3 kg (179 lb 3.7 oz)  Body mass index is 31.75 kg/m².  Body surface area is 1.9 meters squared.      Intake/Output Summary (Last 24 hours) at 6/26/2024 0631  Last data filed at 6/25/2024 1833  Gross per 24 hour   Intake 3174.5 ml   Output 900 ml   Net 2274.5 ml        Physical Exam  Vitals reviewed.   Constitutional:       General: She is not in acute distress.     Appearance: She is well-developed. She is not diaphoretic.   HENT:      Head: Normocephalic and atraumatic.      Right Ear: External ear normal.      Left Ear: External ear normal.      Nose: Nose normal.      Right Sinus: No maxillary sinus tenderness or frontal sinus tenderness.      Left Sinus: No maxillary sinus tenderness or frontal sinus tenderness.      Mouth/Throat:      Pharynx: No oropharyngeal exudate.   Eyes:      General: Lids are normal. No scleral icterus.        Right eye: No discharge.         Left eye: No discharge.      Conjunctiva/sclera: Conjunctivae normal.      Right eye: Right conjunctiva is not injected. No hemorrhage.     Left eye: Left conjunctiva is not injected. No hemorrhage.     Pupils: Pupils are equal, round, and reactive to light.   Neck:      Thyroid: No thyromegaly.      Vascular: No JVD.      Trachea: No tracheal deviation.   Cardiovascular:      Rate and Rhythm: Normal rate.   Pulmonary:      Effort: Pulmonary effort is normal. No respiratory distress.      Breath sounds: No stridor.   Chest:      Chest wall: No tenderness.   Abdominal:      General: Bowel sounds are normal. There is no distension.      Palpations: Abdomen is soft. There is no hepatomegaly, splenomegaly or mass.      Tenderness: There is no abdominal tenderness. There is no rebound.   Musculoskeletal:         General: No tenderness. Normal  "range of motion.      Cervical back: Normal range of motion and neck supple.   Lymphadenopathy:      Cervical: No cervical adenopathy.      Upper Body:      Right upper body: No supraclavicular adenopathy.      Left upper body: No supraclavicular adenopathy.   Skin:     General: Skin is dry.      Findings: No erythema or rash.   Neurological:      Mental Status: She is alert and oriented to person, place, and time.      Cranial Nerves: No cranial nerve deficit.      Coordination: Coordination normal.   Psychiatric:         Behavior: Behavior normal.         Thought Content: Thought content normal.         Judgment: Judgment normal.          Significant Labs:   BMP:   Recent Labs   Lab 06/25/24 0536 06/26/24  0432   GLU 79  79 76     137 137   K 3.7  3.7 3.7     109 109   CO2 19*  19* 17*   BUN 13  13 13   CREATININE 1.2  1.2 1.1   CALCIUM 9.3  9.3 9.2   , CBC:   Recent Labs   Lab 06/25/24 0537   WBC 4.79   HGB 12.0   HCT 37.1      , CMP:   Recent Labs   Lab 06/25/24 0536 06/26/24  0432     137 137   K 3.7  3.7 3.7     109 109   CO2 19*  19* 17*   GLU 79  79 76   BUN 13  13 13   CREATININE 1.2  1.2 1.1   CALCIUM 9.3  9.3 9.2   PROT 9.5*  --    ALBUMIN 2.5*  2.5*  2.5* 2.4*   BILITOT 0.5  --    ALKPHOS 92  --    AST 31  --    ALT 19  --    ANIONGAP 9  9 11   , Coagulation: No results for input(s): "PT", "INR", "APTT" in the last 48 hours., Haptoglobin: No results for input(s): "HAPTOGLOBIN" in the last 48 hours., Immunology: No results for input(s): "SPEP", "WILL", "RAMYA", "FREELAMBDALI" in the last 48 hours., LDH: No results for input(s): "LDHCSF", "BFSOURCE" in the last 48 hours., LFTs:   Recent Labs   Lab 06/25/24 0536 06/26/24  0432   ALT 19  --    AST 31  --    ALKPHOS 92  --    BILITOT 0.5  --    PROT 9.5*  --    ALBUMIN 2.5*  2.5*  2.5* 2.4*   , Reticulocytes: No results for input(s): "RETIC" in the last 48 hours., Tumor Markers: No results for input(s): " ""PSA", "CEA", "", "AFPTM", "[id number]", "" in the last 48 hours.    Invalid input(s): "ALGTM", Uric Acid No results for input(s): "URICACID" in the last 48 hours., and Urine Studies: No results for input(s): "COLORU", "APPEARANCEUA", "PHUR", "SPECGRAV", "PROTEINUA", "GLUCUA", "KETONESU", "BILIRUBINUA", "OCCULTUA", "NITRITE", "UROBILINOGEN", "LEUKOCYTESUR", "RBCUA", "WBCUA", "BACTERIA", "SQUAMEPITHEL", "HYALINECASTS" in the last 48 hours.    Invalid input(s): "WRIGHTSUR"    Diagnostic Results:  I have reviewed all pertinent imaging results/findings within the past 24 hours.  Assessment/Plan:     * Hypercalcemia  I have reviewed records patient has elevated free light chain ratio in elevated gammaglobulin awaiting for interpretation by pathologist serum protein electrophoresis I am very concerned that this patient most likely has multiple myeloma.  I have talked to the patient about the diagnosis I would recommend that the patient have a bone marrow biopsy and a PET scan be done as outpatient.  Diagnosis for the next several weeks.  Will ask our navigation team coordinate patient is discharged in follow-up patient history of multiple she is years ago talked to her fact that this is dramatically different at this time.  Assuming diagnosis is correct  06/25/2024 discussed situation again with patient at this time I am concerned about the possibility of ultimate myeloma with elevated gammaglobulin on serum protein electrophoresis we do not have interpretation as of yet she does have an elevated free light chain ratio.  Again I would recommend proceeding with a PET scan as soon as possible.  In a CT-directed bone marrow biopsy with plasma cell proliferative index.  Discussed implications of answered questions with her about the nature of hypercalcemia in the spectrum from monoclonal gammopathy of undetermined significance to lyle multiple myeloma.  In various treatment options non treatment options based off of " determination if this is correct diagnosis  06/26/2024 results of serum protein electrophoresis demonstrates an M spike of 1.86.  Along with elevated free light chain ratio I am concerned that patient has multiple myeloma again workup as detailed with PET scan outpatient CT-directed bone marrow biopsy with plasma proliferative index explain to patient    SHENA (acute kidney injury)  Cared for by primary care team    Type 2 diabetes mellitus  Cared for by primary care team    Dyslipidemia  Cared for by primary care team        Thank you for your consult. I will follow-up with patient. Please contact us if you have any additional questions.     Marco Alford MD  Hematology/Oncology  [facility] - Tele[facility] ([facility])

## 2024-06-26 NOTE — ASSESSMENT & PLAN NOTE
I have reviewed records patient has elevated free light chain ratio in elevated gammaglobulin awaiting for interpretation by pathologist serum protein electrophoresis I am very concerned that this patient most likely has multiple myeloma.  I have talked to the patient about the diagnosis I would recommend that the patient have a bone marrow biopsy and a PET scan be done as outpatient.  Diagnosis for the next several weeks.  Will ask our navigation team coordinate patient is discharged in follow-up patient history of multiple she is years ago talked to her fact that this is dramatically different at this time.  Assuming diagnosis is correct  06/25/2024 discussed situation again with patient at this time I am concerned about the possibility of ultimate myeloma with elevated gammaglobulin on serum protein electrophoresis we do not have interpretation as of yet she does have an elevated free light chain ratio.  Again I would recommend proceeding with a PET scan as soon as possible.  In a CT-directed bone marrow biopsy with plasma cell proliferative index.  Discussed implications of answered questions with her about the nature of hypercalcemia in the spectrum from monoclonal gammopathy of undetermined significance to lyle multiple myeloma.  In various treatment options non treatment options based off of determination if this is correct diagnosis  06/26/2024 results of serum protein electrophoresis demonstrates an M spike of 1.86.  Along with elevated free light chain ratio I am concerned that patient has multiple myeloma again workup as detailed with PET scan outpatient CT-directed bone marrow biopsy with plasma proliferative index explain to patient

## 2024-06-27 ENCOUNTER — OFFICE VISIT (OUTPATIENT)
Dept: FAMILY MEDICINE | Facility: CLINIC | Age: 54
End: 2024-06-27
Payer: COMMERCIAL

## 2024-06-27 DIAGNOSIS — I10 PRIMARY HYPERTENSION: Primary | ICD-10-CM

## 2024-06-27 LAB
PROT PATTERN UR ELPH-IMP: NORMAL
PTH RELATED PROT SERPL-SCNC: 0.4 PMOL/L

## 2024-06-27 RX ORDER — LOSARTAN POTASSIUM 50 MG/1
50 TABLET ORAL DAILY
Qty: 90 TABLET | Refills: 3 | Status: SHIPPED | OUTPATIENT
Start: 2024-06-27 | End: 2025-06-27

## 2024-06-27 NOTE — PROGRESS NOTES
Subjective     Patient ID: Rosibel Herrera is a 54 y.o. female.    Chief Complaint:Follow up on meds    HPI  Patient presents for follow up on medications. While in the hospital, her Hyzaar was discontinued due to high calcium. She states her blood pressure has been 130/81  Review of Systems   Constitutional:  Positive for unexpected weight change. Negative for activity change.   HENT:  Negative for hearing loss, rhinorrhea and trouble swallowing.    Eyes:  Negative for discharge and visual disturbance.   Respiratory:  Negative for chest tightness and wheezing.    Cardiovascular:  Negative for chest pain and palpitations.   Gastrointestinal:  Negative for blood in stool, constipation, diarrhea and vomiting.   Endocrine: Negative for polydipsia and polyuria.   Genitourinary:  Negative for difficulty urinating, dysuria, hematuria and menstrual problem.   Musculoskeletal:  Negative for arthralgias, joint swelling and neck pain.   Neurological:  Negative for weakness and headaches.   Psychiatric/Behavioral:  Negative for confusion and dysphoric mood.           Objective       Current Outpatient Medications:     amLODIPine (NORVASC) 10 MG tablet, Take 1 tablet (10 mg total) by mouth once daily., Disp: 30 tablet, Rfl: 0    azelastine (ASTELIN) 137 mcg (0.1 %) nasal spray, 2 sprays by Nasal route 2 (two) times daily., Disp: , Rfl:     fluticasone propionate (FLONASE) 50 mcg/actuation nasal spray, by Each Nostril route daily as needed., Disp: , Rfl:     losartan (COZAAR) 50 MG tablet, Take 1 tablet (50 mg total) by mouth once daily., Disp: 90 tablet, Rfl: 3    metFORMIN (GLUCOPHAGE) 500 MG tablet, Take 500 mg by mouth 2 (two) times daily with meals., Disp: , Rfl:     montelukast (SINGULAIR) 10 mg tablet, Take 10 mg by mouth once daily., Disp: , Rfl:   No current facility-administered medications for this visit.     Physical Exam  Constitutional:       Appearance: Normal appearance.   Neurological:      General: No focal  deficit present.      Mental Status: She is alert and oriented to person, place, and time.   Psychiatric:         Mood and Affect: Mood normal.         Behavior: Behavior normal.            Assessment and Plan     1. Primary hypertension  -     losartan (COZAAR) 50 MG tablet; Take 1 tablet (50 mg total) by mouth once daily.  Dispense: 90 tablet; Refill: 3    Will restart Losartan at 50 mg. Patient will be having PET scan. Will follow up on further treatment plan    The patient location is: Home  The chief complaint leading to consultation is: Medication follow up    Visit type: audiovisual    Face to Face time with patient: 15  30 minutes of total time spent on the encounter, which includes face to face time and non-face to face time preparing to see the patient (eg, review of tests), Obtaining and/or reviewing separately obtained history, Documenting clinical information in the electronic or other health record, Independently interpreting results (not separately reported) and communicating results to the patient/family/caregiver, or Care coordination (not separately reported).         Each patient to whom he or she provides medical services by telemedicine is:  (1) informed of the relationship between the physician and patient and the respective role of any other health care provider with respect to management of the patient; and (2) notified that he or she may decline to receive medical services by telemedicine and may withdraw from such care at any time.    Notes:        No follow-ups on file.

## 2024-07-03 ENCOUNTER — TELEPHONE (OUTPATIENT)
Dept: HEMATOLOGY/ONCOLOGY | Facility: CLINIC | Age: 54
End: 2024-07-03
Payer: COMMERCIAL

## 2024-07-03 NOTE — TELEPHONE ENCOUNTER
Called pt to introduce myself as ONN for Dr Alford and schedule her an appt with him after her PET scan.

## 2024-07-03 NOTE — TELEPHONE ENCOUNTER
Called pt to scheduled post hospital appt after her PET scan on mon 7/8. Pt was agreeable to 7/10 at 120 at the . Pt verbalized understanding of time, date and place. No questions or concerns at this time.

## 2024-07-08 ENCOUNTER — TELEPHONE (OUTPATIENT)
Dept: RADIOLOGY | Facility: HOSPITAL | Age: 54
End: 2024-07-08
Payer: COMMERCIAL

## 2024-07-08 ENCOUNTER — HOSPITAL ENCOUNTER (OUTPATIENT)
Dept: RADIOLOGY | Facility: HOSPITAL | Age: 54
Discharge: HOME | End: 2024-07-08
Attending: INTERNAL MEDICINE
Payer: COMMERCIAL

## 2024-07-08 DIAGNOSIS — C90.00 MULTIPLE MYELOMA NOT HAVING ACHIEVED REMISSION: ICD-10-CM

## 2024-07-08 PROCEDURE — A9552 F18 FDG: HCPCS | Performed by: INTERNAL MEDICINE

## 2024-07-08 PROCEDURE — 78816 PET IMAGE W/CT FULL BODY: CPT | Mod: TC

## 2024-07-08 PROCEDURE — 78816 PET IMAGE W/CT FULL BODY: CPT | Mod: 26,PI,, | Performed by: RADIOLOGY

## 2024-07-08 RX ORDER — FLUDEOXYGLUCOSE F18 500 MCI/ML
11.95 INJECTION INTRAVENOUS
Status: COMPLETED | OUTPATIENT
Start: 2024-07-08 | End: 2024-07-08

## 2024-07-08 RX ADMIN — FLUDEOXYGLUCOSE F-18 11.95 MILLICURIE: 500 INJECTION INTRAVENOUS at 01:07

## 2024-07-08 NOTE — TELEPHONE ENCOUNTER
Interventional Radiology  Spoke with pt. on 7/8/24 @ 11:00AM.  She does not want to schedule her BMBX until after she meets with Dr. Alford.  She will call us back to get it scheduled.

## 2024-07-10 ENCOUNTER — OFFICE VISIT (OUTPATIENT)
Dept: HEMATOLOGY/ONCOLOGY | Facility: CLINIC | Age: 54
End: 2024-07-10
Payer: COMMERCIAL

## 2024-07-10 ENCOUNTER — TELEPHONE (OUTPATIENT)
Dept: HEMATOLOGY/ONCOLOGY | Facility: CLINIC | Age: 54
End: 2024-07-10
Payer: COMMERCIAL

## 2024-07-10 VITALS
WEIGHT: 168.75 LBS | DIASTOLIC BLOOD PRESSURE: 94 MMHG | HEIGHT: 63 IN | TEMPERATURE: 97 F | SYSTOLIC BLOOD PRESSURE: 138 MMHG | HEART RATE: 99 BPM | OXYGEN SATURATION: 99 % | BODY MASS INDEX: 29.9 KG/M2

## 2024-07-10 DIAGNOSIS — E83.52 HYPERCALCEMIA: ICD-10-CM

## 2024-07-10 DIAGNOSIS — E66.09 CLASS 1 OBESITY DUE TO EXCESS CALORIES WITHOUT SERIOUS COMORBIDITY WITH BODY MASS INDEX (BMI) OF 30.0 TO 30.9 IN ADULT: ICD-10-CM

## 2024-07-10 DIAGNOSIS — C90.00 MULTIPLE MYELOMA NOT HAVING ACHIEVED REMISSION: Primary | ICD-10-CM

## 2024-07-10 PROCEDURE — 1111F DSCHRG MED/CURRENT MED MERGE: CPT | Mod: CPTII,S$GLB,, | Performed by: INTERNAL MEDICINE

## 2024-07-10 PROCEDURE — 1160F RVW MEDS BY RX/DR IN RCRD: CPT | Mod: CPTII,S$GLB,, | Performed by: INTERNAL MEDICINE

## 2024-07-10 PROCEDURE — 99215 OFFICE O/P EST HI 40 MIN: CPT | Mod: S$GLB,,, | Performed by: INTERNAL MEDICINE

## 2024-07-10 PROCEDURE — 1159F MED LIST DOCD IN RCRD: CPT | Mod: CPTII,S$GLB,, | Performed by: INTERNAL MEDICINE

## 2024-07-10 PROCEDURE — 4010F ACE/ARB THERAPY RXD/TAKEN: CPT | Mod: CPTII,S$GLB,, | Performed by: INTERNAL MEDICINE

## 2024-07-10 PROCEDURE — 3075F SYST BP GE 130 - 139MM HG: CPT | Mod: CPTII,S$GLB,, | Performed by: INTERNAL MEDICINE

## 2024-07-10 PROCEDURE — 99999 PR PBB SHADOW E&M-EST. PATIENT-LVL V: CPT | Mod: PBBFAC,,, | Performed by: INTERNAL MEDICINE

## 2024-07-10 PROCEDURE — 3044F HG A1C LEVEL LT 7.0%: CPT | Mod: CPTII,S$GLB,, | Performed by: INTERNAL MEDICINE

## 2024-07-10 PROCEDURE — 3008F BODY MASS INDEX DOCD: CPT | Mod: CPTII,S$GLB,, | Performed by: INTERNAL MEDICINE

## 2024-07-10 PROCEDURE — 3080F DIAST BP >= 90 MM HG: CPT | Mod: CPTII,S$GLB,, | Performed by: INTERNAL MEDICINE

## 2024-07-10 NOTE — TELEPHONE ENCOUNTER
7/10/2024     1:26 PM 7/10/2024     1:09 PM   DISTRESS SCREENING   Distress Score 0 - No Distress 7   Practical Concerns None of these None of these   Social Concerns None of these None of these   Emotional Concerns None of these Worry or anxiety   Spiritual or Protestant Concerns None of these Sense of meaning or purpose   Physical Concerns None of these None of these   Other Problems  N/A     SW attempted to contact pt to discuss distress score of 7. SW left pt voicemail. SW will remain available.

## 2024-07-10 NOTE — PROGRESS NOTES
Subjective:       Patient ID: Rosibel Herrera is a 54 y.o. female.    Chief Complaint: Results and Cancer    HPI:  54-year-old female hospitalized with hypercalcemia.  Patient was treated with bisphosphonate with lowering of calcium level patient presents back to the office after PET scan for review with 2 of her daughters.  ECOG status 1    Past Medical History:   Diagnosis Date    Calculus of kidney 4/9/2014 2:20:53 PM    [facility] - Urology: KidneyStones-No Additional Notes    Complications affecting other specified body systems, hypertension 3/26/2014 1:38:44 PM    [facility] - Cardiovascular: Hypertension-No Additional Notes    Excessive or frequent menstruation 3/20/2014 9:22:15 AM    [facility]: Bleeding, Menorrhagia-No Additional Notes    Hypertension     Leiomyoma of uterus 3/20/2014 9:23:21 AM    [facility] - [facility]: Fibroids/Leiomyoma of Uterus-2.1x1.8cm (POST/LT); 3.9x3.5cm (ANT TO ENDO)     Family History   Problem Relation Name Age of Onset    Diabetes Father       Social History     Socioeconomic History    Marital status:    Tobacco Use    Smoking status: Never    Smokeless tobacco: Never   Substance and Sexual Activity    Alcohol use: Yes     Comment: rarely    Drug use: No    Sexual activity: Yes     Partners: Male     Birth control/protection: Post-menopausal     Social Determinants of Health     Financial Resource Strain: Low Risk  (5/30/2024)    Overall Financial Resource Strain (CARDIA)     Difficulty of Paying Living Expenses: Not hard at all   Food Insecurity: No Food Insecurity (5/30/2024)    Hunger Vital Sign     Worried About Running Out of Food in the Last Year: Never true     Ran Out of Food in the Last Year: Never true   Physical Activity: Insufficiently Active (5/30/2024)    Exercise Vital Sign     Days of Exercise per Week: 2 days     Minutes of Exercise per Session: 30 min   Stress: No Stress Concern Present  "(5/30/2024)    [employer] - Occupational Stress Questionnaire     Feeling of Stress : Only a little   Housing Stability: Unknown (5/30/2024)    Housing Stability Vital Sign     Unable to Pay for Housing in the Last Year: No     Past Surgical History:   Procedure Laterality Date    ENDOMETRIAL ABLATION      HYSTERECTOMY      fibroids    LITHOTRIPSY         Labs:  Lab Results   Component Value Date    WBC 4.79 06/25/2024    HGB 12.0 06/25/2024    HCT 37.1 06/25/2024    MCV 88 06/25/2024     06/25/2024     BMP  Lab Results   Component Value Date     06/26/2024    K 3.7 06/26/2024     06/26/2024    CO2 17 (L) 06/26/2024    BUN 13 06/26/2024    CREATININE 1.1 06/26/2024    CALCIUM 9.2 06/26/2024    ANIONGAP 11 06/26/2024    ESTGFRAFRICA 60 07/16/2021    EGFRNONAA 52 (A) 07/16/2021     Lab Results   Component Value Date    ALT 19 06/25/2024    AST 31 06/25/2024    ALKPHOS 92 06/25/2024    BILITOT 0.5 06/25/2024       No results found for: "IRON", "TIBC", "FERRITIN", "SATURATEDIRO"  No results found for: "DSOHENCM82"  No results found for: "FOLATE"  Lab Results   Component Value Date    TSH 2.015 06/03/2024         Review of Systems   Constitutional:  Negative for activity change, appetite change, chills, diaphoresis, fatigue, fever and unexpected weight change.   HENT:  Negative for congestion, dental problem, drooling, ear discharge, ear pain, facial swelling, hearing loss, mouth sores, nosebleeds, postnasal drip, rhinorrhea, sinus pressure, sneezing, sore throat, tinnitus, trouble swallowing and voice change.    Eyes:  Negative for photophobia, pain, discharge, redness, itching and visual disturbance.   Respiratory:  Negative for cough, choking, chest tightness, shortness of breath, wheezing and stridor.    Cardiovascular:  Negative for chest pain, palpitations and leg swelling.   Gastrointestinal:  Negative for abdominal distention, abdominal pain, anal bleeding, blood " in stool, constipation, diarrhea, nausea, rectal pain and vomiting.   Endocrine: Negative for cold intolerance, heat intolerance, polydipsia, polyphagia and polyuria.   Genitourinary:  Negative for decreased urine volume, difficulty urinating, dyspareunia, dysuria, enuresis, flank pain, frequency, genital sores, hematuria, menstrual problem, pelvic pain, urgency, vaginal bleeding, vaginal discharge and vaginal pain.   Musculoskeletal:  Negative for arthralgias, back pain, gait problem, joint swelling, myalgias, neck pain and neck stiffness.   Skin:  Negative for color change, pallor and rash.        Subcutaneous nodules on face   Allergic/Immunologic: Negative for environmental allergies, food allergies and immunocompromised state.   Neurological:  Negative for dizziness, tremors, seizures, syncope, facial asymmetry, speech difficulty, weakness, light-headedness, numbness and headaches.   Hematological:  Negative for adenopathy. Does not bruise/bleed easily.   Psychiatric/Behavioral:  Positive for dysphoric mood. Negative for agitation, behavioral problems, confusion, decreased concentration, hallucinations, self-injury, sleep disturbance and suicidal ideas. The patient is nervous/anxious. The patient is not hyperactive.        Objective:      Physical Exam  Vitals reviewed.   Constitutional:       General: She is not in acute distress.     Appearance: She is well-developed. She is not diaphoretic.   HENT:      Head: Normocephalic and atraumatic.      Right Ear: External ear normal.      Left Ear: External ear normal.      Nose: Nose normal.      Right Sinus: No maxillary sinus tenderness or frontal sinus tenderness.      Left Sinus: No maxillary sinus tenderness or frontal sinus tenderness.      Mouth/Throat:      Pharynx: No oropharyngeal exudate.   Eyes:      General: Lids are normal. No scleral icterus.        Right eye: No discharge.         Left eye: No discharge.      Conjunctiva/sclera: Conjunctivae normal.       Right eye: Right conjunctiva is not injected. No hemorrhage.     Left eye: Left conjunctiva is not injected. No hemorrhage.     Pupils: Pupils are equal, round, and reactive to light.   Neck:      Thyroid: No thyromegaly.      Vascular: No JVD.      Trachea: No tracheal deviation.   Cardiovascular:      Rate and Rhythm: Normal rate.   Pulmonary:      Effort: Pulmonary effort is normal. No respiratory distress.      Breath sounds: No stridor.   Chest:      Chest wall: No tenderness.   Abdominal:      General: Bowel sounds are normal. There is no distension.      Palpations: Abdomen is soft. There is no hepatomegaly, splenomegaly or mass.      Tenderness: There is no abdominal tenderness. There is no rebound.   Musculoskeletal:         General: No tenderness. Normal range of motion.      Cervical back: Normal range of motion and neck supple.   Lymphadenopathy:      Cervical: No cervical adenopathy.      Upper Body:      Right upper body: No supraclavicular adenopathy.      Left upper body: No supraclavicular adenopathy.   Skin:     General: Skin is dry.      Findings: Lesion present. No erythema or rash.      Comments: Subcutaneous nodules on face   Neurological:      Mental Status: She is alert and oriented to person, place, and time.      Cranial Nerves: No cranial nerve deficit.      Coordination: Coordination normal.   Psychiatric:         Behavior: Behavior normal.         Thought Content: Thought content normal.         Judgment: Judgment normal.             Assessment:      1. Multiple myeloma not having achieved remission    2. Class 1 obesity due to excess calories without serious comorbidity with body mass index (BMI) of 30.0 to 30.9 in adult    3. Hypercalcemia           Med Onc Chart Routing      Follow up with physician . Return next week with CMP to see me   Follow up with [facility]    Infusion scheduling note    Injection scheduling note    Labs    Imaging    Pharmacy appointment    Other referrals          Patient needs bone marrow biopsy with plasma cell proliferative index patient also needs to see Pulmonary Medicine and Surgical Oncology              Plan:     Reviewed results of PET scan with patient and family patient has subcutaneous nodules on face possible hypercalcemia with marked abnormalities with M spike.  At this time would recommend patient proceed with bone marrow biopsy and aspirate and biopsies of hypermetabolic area in lung as well as subcutaneous nodules on skin.  I will present at multidisciplinary tumor conference this week for review in proceed follow-up next week with CMP to make sure calcium level remains stable.  Discussed implications with her daughters in reviewed images personally told high likelihood either lymphoma in multiple myeloma wishes highly treatable although not curable        Marco Alford Jr, MD FACP

## 2024-07-11 ENCOUNTER — TELEPHONE (OUTPATIENT)
Dept: SURGICAL ONCOLOGY | Facility: CLINIC | Age: 54
End: 2024-07-11
Payer: COMMERCIAL

## 2024-07-11 ENCOUNTER — TELEPHONE (OUTPATIENT)
Dept: PULMONOLOGY | Facility: CLINIC | Age: 54
End: 2024-07-11
Payer: COMMERCIAL

## 2024-07-11 ENCOUNTER — OFFICE VISIT (OUTPATIENT)
Dept: SLEEP MEDICINE | Facility: CLINIC | Age: 54
End: 2024-07-11
Payer: COMMERCIAL

## 2024-07-11 VITALS — WEIGHT: 168 LBS | HEIGHT: 63 IN | BODY MASS INDEX: 29.77 KG/M2

## 2024-07-11 DIAGNOSIS — C90.00 MULTIPLE MYELOMA NOT HAVING ACHIEVED REMISSION: ICD-10-CM

## 2024-07-11 DIAGNOSIS — R94.8 ABNORMAL PET SCAN OF MEDIASTINUM: Primary | ICD-10-CM

## 2024-07-11 PROCEDURE — 3044F HG A1C LEVEL LT 7.0%: CPT | Mod: CPTII,95,, | Performed by: INTERNAL MEDICINE

## 2024-07-11 PROCEDURE — 1159F MED LIST DOCD IN RCRD: CPT | Mod: CPTII,95,, | Performed by: INTERNAL MEDICINE

## 2024-07-11 PROCEDURE — 1111F DSCHRG MED/CURRENT MED MERGE: CPT | Mod: CPTII,95,, | Performed by: INTERNAL MEDICINE

## 2024-07-11 PROCEDURE — 1160F RVW MEDS BY RX/DR IN RCRD: CPT | Mod: CPTII,95,, | Performed by: INTERNAL MEDICINE

## 2024-07-11 PROCEDURE — 99203 OFFICE O/P NEW LOW 30 MIN: CPT | Mod: 95,,, | Performed by: INTERNAL MEDICINE

## 2024-07-11 PROCEDURE — 3008F BODY MASS INDEX DOCD: CPT | Mod: CPTII,95,, | Performed by: INTERNAL MEDICINE

## 2024-07-11 PROCEDURE — 4010F ACE/ARB THERAPY RXD/TAKEN: CPT | Mod: CPTII,95,, | Performed by: INTERNAL MEDICINE

## 2024-07-11 RX ORDER — SODIUM CHLORIDE 9 MG/ML
INJECTION, SOLUTION INTRAVENOUS CONTINUOUS
OUTPATIENT
Start: 2024-07-11

## 2024-07-11 RX ORDER — LIDOCAINE HYDROCHLORIDE 40 MG/ML
4 SOLUTION TOPICAL ONCE
OUTPATIENT
Start: 2024-07-11 | End: 2024-07-11

## 2024-07-11 RX ORDER — LIDOCAINE HYDROCHLORIDE 10 MG/ML
20 INJECTION INFILTRATION; PERINEURAL ONCE
OUTPATIENT
Start: 2024-07-11 | End: 2024-07-11

## 2024-07-11 NOTE — H&P (VIEW-ONLY)
The patient location is: [facility]  The chief complaint leading to consultation is:   Chief Complaint   Patient presents with    discuss EBUS         Visit type: audiovisual    Face to Face time with patient: 15 mins  45 minutes of total time spent on the encounter, which includes face to face time and non-face to face time preparing to see the patient (eg, review of tests), Obtaining and/or reviewing separately obtained history, Documenting clinical information in the electronic or other health record, Independently interpreting results (not separately reported) and communicating results to the patient/family/caregiver, or Care coordination (not separately reported).         Each patient to whom he or she provides medical services by telemedicine is:  (1) informed of the relationship between the physician and patient and the respective role of any other health care provider with respect to management of the patient; and (2) notified that he or she may decline to receive medical services by telemedicine and may withdraw from such care at any time.    Notes:                                               Pulmonary Outpatient  Visit     Subjective:       Patient ID: Rosibel Herrera is a 54 y.o. female.    Social History     Tobacco Use   Smoking Status Never   Smokeless Tobacco Never            Chief Complaint: discuss EBUS          Rosibel Herrera is 54 y.o.  Asked to see by Marco Alford MD  [address] [place] [address]   Recently in hospital [facility]  Hypercalcemia  Now resolved  Discharge summary reveiwed  Abn PET CT: FDG avid areas 4R,7, 10 R, 10 L  Requested for EBUS sampling    Work at [employer]    Chest: No FDG avid pulmonary nodules/masses. Numerous FDG avid superior mediastinal lymph nodes including the right paratracheal region demonstrating SUV max of up to 4.8.  FDG avid prevascular lymph nodes demonstrate an SUV max of up to 54.8.  Subcarinal lymphadenopathy demonstrate an  SUV max of 4.1.  FDG avid lymph nodes in the right hilar region/suprahilar region demonstrating an SUV max of up to 8.2 and FDG avid left hilar lymph nodes demonstrate a SUV max of up to 4.8.                 [facility] - Tele[facility] ([facility])  [facility] Medicine  Discharge Summary        Patient Name: Rosibel Herrera  MRN: [id number]  EMRE: [id number]  Patient Class: IP- Inpatient  Admission Date: 6/22/2024  Hospital Length of Stay: 3 days  Discharge Date and Time:  06/26/2024 10:13 AM  Attending Physician: Tammy Granger MD   Discharging Provider: Tammy Granger MD  Primary Care Provider: Briana Vu DO     Primary Care Team: Networked reference to record PCT      HPI:   Rosibel Herrera is a 54 y.o. female with a PMH  has a past medical history of Calculus of kidney (4/9/2014 2:20:53 PM), Complications affecting other specified body systems, hypertension (3/26/2014 1:38:44 PM), Excessive or frequent menstruation (3/20/2014 9:22:15 AM), Hypertension, and Leiomyoma of uterus (3/20/2014 9:23:21 AM). who presented to the ED directed by primary nephrologist for abnormal lab findings consist of hypercalcemia.  Patient presented with persistent complaints of fatigue, nausea, and constipation x2 weeks duration and was found to have hypercalcemia measuring 12.1 with corrected calcium measuring 13.1.  Patient denied endorsing any other symptoms and reported being in her usual state of health with no other concerns or complaints.  Nephrology was consulted by ED staff to obtain further recommendations and recommended patient be initiated on normal saline at 100 cc/hour with repeat labs in the morning.  Patient admitted to Hospital Medicine under observation for continued medical management.     PCP: Briana Vu     * No surgery found *       Hospital Course:   6/23 admitted for hypercalcemia. Instructed to come to emergency department due to labs. Unclear etiology. Referral to nephrology by primary care provider  due to abnormal kidney function. States urinary frequency, frothy in character. Associated with confusion, nausea, vomiting, fatigue, reduced appetite. Nephrology consulted. Increase fluids  6/24 hypercalcemia persists. Continue intravenous fluids. Nephrology recommending hem/onc consult due to abnormal kappa/lambda light chains. Patient reports mild confusion, kidney stones but denies bone pain or abdominal pain.  6/25 hem/onc consulted for abnormal free light chain test. Recommendations reviewed with outpatient further workup. Continue fluids. Hypercalcemia improving.     6/26  Hypercalcemia resolved. After discussing with hem/onc, ok to discharge home with close follow up with hem/onc for follow up imaging and bone marrow biopsy for concerns of multiple myeloma. Nephrology recommending holding arb until follow up with primary care provider for monitoring kidney function.     No acute distress. No respiratory distress. On room air. Soft abdomen, nontender. No lower extremity edema. AO3     Patient seen and evaluated by me. Patient was determined to be suitable for discharge. Patient deemed stable for discharge to home with nurse practitioner to visit home program.         Goals of Care Treatment Preferences:  Code Status: Full Code        Consults:   Consults (From admission, onward)            Status Ordering Provider       Inpatient consult to Hematology/Oncology  Once        Provider:  Marco Alford MD    Acknowledged OPAL LITTLE       Inpatient consult to Nephrology  Once        Provider:  Oneil Otero MD    Acknowledged OPAL LITTLE       Inpatient consult to Registered Dietitian/Nutritionist  Once        Provider:  (Not yet assigned)    RICARDO Napier                No new Assessment & Plan notes have been filed under this hospital service since the last note was generated.  Service: Hospital Medicine            Final Active Diagnoses:     Diagnosis Date Noted POA    Hypertension  [I10] 06/23/2024 Yes    Class 1 obesity due to excess calories with body mass index (BMI) of 30.0 to 30.9 in adult [E66.09, Z68.30] 06/23/2024 Not Applicable    Vitamin D deficiency [E55.9] 02/04/2024 Yes    Dyslipidemia [E78.5] 01/05/2023 Yes    Type 2 diabetes mellitus [E11.9] 10/18/2021 Yes       Problems Resolved During this Admission:     Diagnosis Date Noted Date Resolved POA    PRINCIPAL PROBLEM:  Hypercalcemia [E83.52] 06/22/2024 06/26/2024 Yes    SHENA (acute kidney injury) [N17.9] 06/23/2024 06/26/2024 Yes         Discharged Condition: stable     Disposition:      Follow Up:    Follow-up Information         Briana Vu DO. Schedule an appointment as soon as possible for a visit in 3 day(s).    Specialty: Internal Medicine  Why: hospital follow up  Contact information:  [address]  [place] [id number] [phone]                    Marco Alford MD. Schedule an appointment as soon as possible for a visit in 1 week(s).    Specialty: Hematology and Oncology  Why: hospital follow up  Contact information:  [address] [facility]  [place] [address] [phone]                                   Patient Instructions:       Ambulatory referral/consult to [facility]      Diet Cardiac      Diet diabetic      Activity as tolerated         Significant Diagnostic Studies: Labs: CMP        Recent Labs   Lab 06/25/24  0536 06/26/24  0432     137 137   K 3.7  3.7 3.7     109 109   CO2 19*  19* 17*   GLU 79  79 76   BUN 13  13 13   CREATININE 1.2  1.2 1.1   CALCIUM 9.3  9.3 9.2   PROT 9.5*  --    ALBUMIN 2.5*  2.5*  2.5* 2.4*   BILITOT 0.5  --    ALKPHOS 92  --    AST 31  --    ALT 19  --    ANIONGAP 9  9 11   , CBC       Recent Labs   Lab 06/25/24  0537   WBC 4.79   HGB 12.0   HCT 37.1      , A1C:       Recent Labs   Lab 06/03/24  1252   HGBA1C 6.2*   , All labs within the past 24 hours have been reviewed, and pth within normal limits; beta 2  microglobulin 6.8; ldh 153  Radiology: Ultrasound: bilateral nonobstructing stones     Pending Diagnostic Studies:         Procedure Component Value Units Date/Time     PTH-related peptide [[id number]] Collected: 06/23/24 1214     Order Status: Sent Lab Status: In process Updated: 06/23/24 1905     Specimen: Blood       Protein electrophoresis, timed urine [[id number]] Collected: 06/25/24 1832     Order Status: Sent Lab Status: In process Updated: 06/26/24 0117     Specimen: Urine, Clean Catch               Medications:  Reconciled Home Medications:       Medication List          CHANGE how you take these medications       amLODIPine 10 MG tablet  Commonly known as: NORVASC  Take 1 tablet (10 mg total) by mouth once daily.  Start taking on: June 27, 2024  What changed:   medication strength  when to take this                CONTINUE taking these medications       azelastine 137 mcg (0.1 %) nasal spray  Commonly known as: ASTELIN  2 sprays by Nasal route 2 (two) times daily.      fluticasone propionate 50 mcg/actuation nasal spray  Commonly known as: FLONASE  by Each Nostril route daily as needed.      metFORMIN 500 MG tablet  Commonly known as: GLUCOPHAGE  Take 500 mg by mouth 2 (two) times daily with meals.      montelukast 10 mg tablet  Commonly known as: SINGULAIR  Take 10 mg by mouth once daily.                STOP taking these medications       cholecalciferol (vitamin D3) 1,250 mcg (50,000 unit) capsule      doxycycline 100 MG capsule  Commonly known as: MONODOX      losartan-hydrochlorothiazide 50-12.5 mg 50-12.5 mg per tablet  Commonly known as: HYZAAR                   Indwelling Lines/Drains at time of discharge:   Lines/Drains/Airways         None                         Time spent on the discharge of patient: 39 minutes           Tammy Granger MD  Department of Hospital Medicine  '[facility]      Electronically signed by Tammy Granger MD at 6/26/2024 10:14 AM         ED to  Hosp-Admission (Discharged) on 6/22/2024            Detailed Report          Note shared with patient    Review of Systems    Outpatient Encounter Medications as of 7/11/2024   Medication Sig Dispense Refill    amLODIPine (NORVASC) 10 MG tablet Take 1 tablet (10 mg total) by mouth once daily. 30 tablet 0    fluticasone propionate (FLONASE) 50 mcg/actuation nasal spray by Each Nostril route daily as needed.      losartan (COZAAR) 50 MG tablet Take 1 tablet (50 mg total) by mouth once daily. 90 tablet 3    metFORMIN (GLUCOPHAGE) 500 MG tablet Take 500 mg by mouth 2 (two) times daily with meals.      azelastine (ASTELIN) 137 mcg (0.1 %) nasal spray 2 sprays by Nasal route 2 (two) times daily. (Patient not taking: Reported on 7/11/2024)      montelukast (SINGULAIR) 10 mg tablet Take 10 mg by mouth once daily. (Patient not taking: Reported on 7/11/2024)       No facility-administered encounter medications on file as of 7/11/2024.       The following portions of the patient's history were reviewed and updated as appropriate: She  has a past medical history of Calculus of kidney (4/9/2014 2:20:53 PM), Complications affecting other specified body systems, hypertension (3/26/2014 1:38:44 PM), Excessive or frequent menstruation (3/20/2014 9:22:15 AM), Hypertension, and Leiomyoma of uterus (3/20/2014 9:23:21 AM).  She does not have any pertinent problems on file.  She  has a past surgical history that includes Lithotripsy; Endometrial ablation; and Hysterectomy.  Her family history includes Diabetes in her father.  She  reports that she has never smoked. She has never used smokeless tobacco. She reports current alcohol use. She reports that she does not use drugs.  She has a current medication list which includes the following prescription(s): amlodipine, fluticasone propionate, losartan, metformin, azelastine, and montelukast.  Current Outpatient Medications on File Prior to Visit   Medication Sig Dispense Refill     "amLODIPine (NORVASC) 10 MG tablet Take 1 tablet (10 mg total) by mouth once daily. 30 tablet 0    fluticasone propionate (FLONASE) 50 mcg/actuation nasal spray by Each Nostril route daily as needed.      losartan (COZAAR) 50 MG tablet Take 1 tablet (50 mg total) by mouth once daily. 90 tablet 3    metFORMIN (GLUCOPHAGE) 500 MG tablet Take 500 mg by mouth 2 (two) times daily with meals.      azelastine (ASTELIN) 137 mcg (0.1 %) nasal spray 2 sprays by Nasal route 2 (two) times daily. (Patient not taking: Reported on 7/11/2024)      montelukast (SINGULAIR) 10 mg tablet Take 10 mg by mouth once daily. (Patient not taking: Reported on 7/11/2024)       No current facility-administered medications on file prior to visit.     She is allergic to tylox [oxycodone-acetaminophen]..      BP Readings from Last 3 Encounters:   07/10/24 (!) 138/94   06/26/24 135/76   05/31/24 (!) 140/90          MMRC Dyspnea Scale (4 is worst)     [] MMRC 0: Dyspneic on strenuous excercise (0 points)    [] MMRC 1: Dyspneic on walking a slight hill (0 points)    [] MMRC 2: Dyspneic on walking level ground; must stop occasionally due to breathlessness (1 point)    [] MMRC 3: Must stop for breathlessness after walking 100 yards or after a few minutes (2 points)    [] MMRC 4: Cannot leave house; breathless on dressing/undressing (3 points)                          No data to display                          Objective:     Vital Signs (Most Recent)  Height and Weight  Height: 5' 3" (160 cm)  Weight: 76.2 kg (168 lb)  BSA (Calculated - sq m): 1.84 sq meters  BMI (Calculated): 29.8  Weight in (lb) to have BMI = 25: 140.8]  Wt Readings from Last 2 Encounters:   07/11/24 76.2 kg (168 lb)   07/10/24 76.5 kg (168 lb 12.2 oz)       Physical Exam  Vitals and nursing note reviewed.   HENT:      Head: Normocephalic.   Eyes:      Pupils: Pupils are equal, round, and reactive to light.   Neurological:      Mental Status: She is alert and oriented to person, place, " "and time.          Laboratory  Lab Results   Component Value Date    WBC 4.79 06/25/2024    RBC 4.23 06/25/2024    HGB 12.0 06/25/2024    HCT 37.1 06/25/2024    MCV 88 06/25/2024    MCH 28.4 06/25/2024    MCHC 32.3 06/25/2024    RDW 13.7 06/25/2024     06/25/2024    MPV 10.4 06/25/2024    GRAN 2.6 06/25/2024    GRAN 54.0 06/25/2024    LYMPH 1.1 06/25/2024    LYMPH 22.3 06/25/2024    MONO 0.7 06/25/2024    MONO 14.2 06/25/2024    EOS 0.4 06/25/2024    BASO 0.05 06/25/2024    EOSINOPHIL 8.1 (H) 06/25/2024    BASOPHIL 1.0 06/25/2024       BMP  Lab Results   Component Value Date     06/26/2024    K 3.7 06/26/2024     06/26/2024    CO2 17 (L) 06/26/2024    BUN 13 06/26/2024    CREATININE 1.1 06/26/2024    CALCIUM 9.2 06/26/2024    ANIONGAP 11 06/26/2024    ESTGFRAFRICA 60 07/16/2021    EGFRNONAA 52 (A) 07/16/2021    AST 31 06/25/2024    ALT 19 06/25/2024    PROT 9.5 (H) 06/25/2024          No results found for: "IGE"     No results found for: "ASPERGILLUS"  No results found for: "AFUMIGATUSCL"     No results found for: "ACE"     Diagnostic Results:  I have personally reviewed today the following studies:    NM PET CT FDG Whole Body  Narrative: EXAMINATION:  NM PET CT FDG WHOLE BODY    CLINICAL HISTORY:  Hematologic malignancy, staging;Occult malignancy;  Multiple myeloma not having achieved remission    TECHNIQUE:  Segmented attenuation corrected 3-D PET imaging was obtained from the vertex of skull through the bilateral feet utilizing 11.95 mCi F-18-FDG.  Glucose level 104 mg/dL noncontrast CT imaging was performed for attenuation correction, diagnosis, and anatomical fusion with PET    COMPARISON:  None.    FINDINGS:  Head/neck: There is normal physiologic uptake noted within the visualized brain parenchyma.  There are foci of uptake seen within the skin/subcutaneous soft tissues of either cheek demonstrating an SUV max of up to 6.6 on the right and 3.5 on the left which may be representative " metastatic disease.  There is also an FDG avid submental lymph node that demonstrates an SUV max of 5.3.  FDG avid lesion within the left lobe of the thyroid gland demonstrating SUV max of 5.1.  FDG avid lymph node deep to the left lobe of the thyroid gland demonstrating an SUV max of 3.8.  Single FDG avid lymph nodes seen in the supraclavicular region bilaterally demonstrating an SUV max of 4.2 on the right and 2.7 on the left.    Chest: No FDG avid pulmonary nodules/masses. Numerous FDG avid superior mediastinal lymph nodes including the right paratracheal region demonstrating SUV max of up to 4.8.  FDG avid prevascular lymph nodes demonstrate an SUV max of up to 54.8.  Subcarinal lymphadenopathy demonstrate an SUV max of 4.1.  FDG avid lymph nodes in the right hilar region/suprahilar region demonstrating an SUV max of up to 8.2 and FDG avid left hilar lymph nodes demonstrate a SUV max of up to 4.8.    Abdomen/Pelvis: Normal physiologic uptake noted within the liver, spleen, urinary tract, and bowel.The adrenals are unremarkable in appearance.  There is diverticulosis of the sigmoid colon.  FDG avid lymph nodes seen in either inguinal region demonstrating SUV max of up to 5.1 on the right and 5.6 on the left.  FDG avid external iliac chain lymph nodes on the left demonstrating an SUV max of up to 3.6.  FDG avid right external iliac/obturator lymph node demonstrating SUV max of up to 3.1.  Mildly FDG avid presumed lymph nodes within the upper abdomen adjacent to the left side of the aorta demonstrating an SUV max of up to 2.9.  The spleen is enlarged.    Skeletal/soft tissues: There is also an FDG avid lymph node seen posterior and medial to the distal shaft of the left femur along the course of the distal superficial femoral artery/vein demonstrating an SUV max of up to 3.1.  Small FDG avid soft tissue nodule seen adjacent to the lateral aspect of the patella inferiorly on the left demonstrate an SUV max of 2.8.   Multiple foci of prominent uptake associated with the calf musculature bilaterally suspicious for disease demonstrating SUV max of up to 7.1 on the right and 7.4 on the left.  Impression: 1. Multiple sites of abnormal FDG uptake seen within the soft tissues of the neck, face, left lobe of the thyroid gland, mediastinal and bilateral hilar adenopathy along with some FDG avid lymph nodes seen within the abdomen and pelvis and bilateral inguinal regions and single FDG avid lymph node seen adjacent to the distal femur along the course of the superficial femoral artery on the left.  There is also a single FDG avid lesion within the soft tissues inferior and posterior to the patella on the left.  Extensive uptake seen within the musculature of the calf regions bilaterally consistent with disease.  Enlarged spleen.    Electronically signed by: Weston Ferreira DO  Date:    07/08/2024  Time:    15:21     EKG  Sinus tachy  Prolonged QT  Assessment/Plan:     Problem List Items Addressed This Visit       Abnormal PET scan of mediastinum - Primary    Relevant Orders    ANGIOTENSIN CONVERTING ENZYME    Interleukin-2 Receptor    Case Request Endoscopy: Bronchoscopy - insert lighted tube into airway to take a biopsy of lung, ENDOBRONCHIAL ULTRASOUND (EBUS) (Completed)     Other Visit Diagnoses       Multiple myeloma not having achieved remission        Relevant Orders    Case Request Endoscopy: Bronchoscopy - insert lighted tube into airway to take a biopsy of lung, ENDOBRONCHIAL ULTRASOUND (EBUS) (Completed)             My diagnostic impression and work-up plan were discussed at length with patient. Risks were discussed. EBUS procedure. Complications of the procedure discussed in detail with patient. Complications including but not limited to infection that may require hospital admission, bleeding that may require blood transfusion and or hospital admission, perforation of the lung which may require surgery,chance of injury to the  throat, windpipe or bronchial tubes, laryngospam, coughing, aspiration, hypoxemia or cardiac arrythmias. Patient expressed and verbalized understanding. The material risks of anesthesia in connection with the procedure including brain damage, paralysis from the neck downwards, paralysis from the waist downwards, loss of function of an arm or a leg, disfigurement (incluing scars)and death were discussed with patient who expressedand verbalized understanding. Alternate treatments and material risks associated with such alternatives were discussed with pateint. These include radiologic surveillance with minimal risk and sugery with an indeterminate risk. The material risks of refusing the procedure was discussed in detail. This includes no diagnosis or confirmation of diagnisis and rendering of appropriate treatment the risk of which depends on the nature of the diagnosed illness. Patient expressed and verbalized understanding. Procedure scheduled for date July 17th 2024. Consent signed. Orders entered. Coagulation studies per orders.   All questions were answered and the patient expressed understanding    Stations: 4R, 7, 10/11 R, 10/11 L     Follow up in about 4 weeks (around 8/8/2024), or EBUS consent, labs,.    This note was prepared using voice recognition system and is likely to have sound alike errors that may have been overlooked even after proof reading.  Please call me with any questions    Discussed diagnosis, its evaluation, treatment and usual course. All questions answered.    Thank you for the courtesy of participating in the care of this patient    Liang Lu MD      Personal Diagnostic Review  []  CXR    []  ECHO    []  ONSAT    []  6MWD    []  LABS    []  CHEST CT    []  PET CT    []  Biopsy results

## 2024-07-11 NOTE — TELEPHONE ENCOUNTER
Spoke with patient to schedule appointment from referral from Dr. Alford. Appointment scheduled at cancer center with Dr. Rojas on Tuesday 7/16 at 330 pm. Labs rescheduled to the cancer center to be done the same day. Direct number provided to patient. Patient verbalized understanding.

## 2024-07-11 NOTE — PATIENT INSTRUCTIONS
PRE-Procedure INSTRUCTIONS   *Arrive at     am / pm (2 hours before your scheduled procedure time). This arrival time will allow us time to prepare you for your  procedure on (day)  At(time)   On(date)   Check in at Endoscopy Lab desk on the 5th Floor [facility] [address] [facility] [place] [address]). You DO NOT check in on the first floor for this procedure.   Please read the following instructions carefully before your appointment.   ALL PATIENTS:   Plan to be at the hospital for approximately 4-6 hours.   You must have a responsible person who can drive you to the hospital, stay while the procedure is being done, assume responsibility for your care at discharge, and drive you home. If not, your exam will be canceled.   Please leave all valuables at home, including jewelry. You will need to bring your s license and medical insurance card.   Also, you will be responsible for any co-payment at time of registration.   You will be sedated for the procedure. Due to the sedation you will not be able to operate a vehicle or sign any legal documentation for 24 hours after exam.   Wear loose and comfortable clothing and wear socks if you are cold natured.   Bring all medications (in original containers) that you are currently taking, or a complete list.   To prepare for this test, you MAY NOT have anything to eat or drink after midnight, not even water, unless you take any necessary medications as listed in # 1 below and then a sip of water with those medications is allowed.   1 - If you take BLOOD PRESSURE, HEART, SEIZURE or PSYCHIATRIC MEDICATIONS in the morning, please take them the morning of your procedure.   Please take these medications as soon as you awaken with a small sip of water ... please make sure they are taken before you leave to come to the hospital for your procedure.   On the day before your procedure, take all of your regular  scheduled medications except for the blood thinning medications discussed below.   2 - If you are DIABETIC:   Check blood sugar levels on the morning of the exam and/or as needed if you feel hypoglycemic (low blood sugar).   DO NOT TAKE any diabetic medications (including insulin) the morning of the exam.   If your blood sugar goes below 70, you may drink 4 ounces of juice, soda or eat a piece of hard candy. Wait 15 minutes then recheck your blood sugar. If it isn't going up, you may drink another 4 ounces and contact your Primary Care doctor or our office. Please do not have any liquids within 2 hours of your arrival time.   3 - STOP BLOOD THINNING MEDICATION, Aspirin, Ibuprofen, Naproxen, etc as listed below:   Coumadin, Plavix, Aspirin, NSAIDs (nonsteroidal anti-inflammatory drugs)  and fish oil.   If on Coumadin or Plavix, notify the prescribing physician that it is being temporarily discontinued for procedure.   Coumadin must be stopped 3 days prior to exam.   Plavix, Aspirin and NSAIDs (see above) must be stopped 7 DAYS PRIOR TO EXAM.   If you are on a blood thinner not listed, please contact your physician about stop times. Such as Aggrenox, Brilinta, Effient, Pradaxa, Xaralto, etc.   Avoid Smoking for 24 hours prior to the test!   Endoscopy Pre-Procedure Nursing Call Line [phone]   For Insurance or Financial obligations, call [phone]   [facility] Nursing Line [phone]

## 2024-07-11 NOTE — PROGRESS NOTES
The patient location is: [facility]  The chief complaint leading to consultation is:   Chief Complaint   Patient presents with    discuss EBUS         Visit type: audiovisual    Face to Face time with patient: 15 mins  45 minutes of total time spent on the encounter, which includes face to face time and non-face to face time preparing to see the patient (eg, review of tests), Obtaining and/or reviewing separately obtained history, Documenting clinical information in the electronic or other health record, Independently interpreting results (not separately reported) and communicating results to the patient/family/caregiver, or Care coordination (not separately reported).         Each patient to whom he or she provides medical services by telemedicine is:  (1) informed of the relationship between the physician and patient and the respective role of any other health care provider with respect to management of the patient; and (2) notified that he or she may decline to receive medical services by telemedicine and may withdraw from such care at any time.    Notes:                                               Pulmonary Outpatient  Visit     Subjective:       Patient ID: Rosibel Herrera is a 54 y.o. female.    Social History     Tobacco Use   Smoking Status Never   Smokeless Tobacco Never            Chief Complaint: discuss EBUS          Rosibel Herrera is 54 y.o.  Asked to see by Marco Alford MD  [id number] [place] [address]   [facility]  Hypercalcemia  Now resolved  Discharge summary reveiwed  Abn PET CT: FDG avid areas 4R,7, 10 R, 10 L  Requested for EBUS sampling    Work at [employer]    Chest: No FDG avid pulmonary nodules/masses. Numerous FDG avid superior mediastinal lymph nodes including the right paratracheal region demonstrating SUV max of up to 4.8.  FDG avid prevascular lymph nodes demonstrate an SUV max of up to 54.8.  Subcarinal lymphadenopathy demonstrate an  SUV max of 4.1.  FDG avid lymph nodes in the right hilar region/suprahilar region demonstrating an SUV max of up to 8.2 and FDG avid left hilar lymph nodes demonstrate a SUV max of up to 4.8.                 [facility] Tele[facility] ([facility])  [facility] Medicine  Discharge Summary        Patient Name: Rosibel Herrera  MRN: [id number]  EMRE: [id number]  Patient Class: IP- Inpatient  Admission Date: 6/22/2024  Hospital Length of Stay: 3 days  Discharge Date and Time:  06/26/2024 10:13 AM  Attending Physician: Tammy Granger MD   Discharging Provider: Tammy Granger MD  Primary Care Provider: Briana Vu DO     Primary Care Team: Networked reference to record PCT      HPI:   Rosibel Herrera is a 54 y.o. female with a PMH  has a past medical history of Calculus of kidney (4/9/2014 2:20:53 PM), Complications affecting other specified body systems, hypertension (3/26/2014 1:38:44 PM), Excessive or frequent menstruation (3/20/2014 9:22:15 AM), Hypertension, and Leiomyoma of uterus (3/20/2014 9:23:21 AM). who presented to the ED directed by primary nephrologist for abnormal lab findings consist of hypercalcemia.  Patient presented with persistent complaints of fatigue, nausea, and constipation x2 weeks duration and was found to have hypercalcemia measuring 12.1 with corrected calcium measuring 13.1.  Patient denied endorsing any other symptoms and reported being in her usual state of health with no other concerns or complaints.  Nephrology was consulted by ED staff to obtain further recommendations and recommended patient be initiated on normal saline at 100 cc/hour with repeat labs in the morning.  Patient admitted to Hospital Medicine under observation for continued medical management.     PCP: Briana Vu     * No surgery found *       Hospital Course:   6/23 admitted for hypercalcemia. Instructed to come to emergency department due to labs. Unclear etiology. Referral to nephrology by primary care provider  due to abnormal kidney function. States urinary frequency, frothy in character. Associated with confusion, nausea, vomiting, fatigue, reduced appetite. Nephrology consulted. Increase fluids  6/24 hypercalcemia persists. Continue intravenous fluids. Nephrology recommending hem/onc consult due to abnormal kappa/lambda light chains. Patient reports mild confusion, kidney stones but denies bone pain or abdominal pain.  6/25 hem/onc consulted for abnormal free light chain test. Recommendations reviewed with outpatient further workup. Continue fluids. Hypercalcemia improving.     6/26  Hypercalcemia resolved. After discussing with hem/onc, ok to discharge home with close follow up with hem/onc for follow up imaging and bone marrow biopsy for concerns of multiple myeloma. Nephrology recommending holding arb until follow up with primary care provider for monitoring kidney function.     No acute distress. No respiratory distress. On room air. Soft abdomen, nontender. No lower extremity edema. AO3     Patient seen and evaluated by me. Patient was determined to be suitable for discharge. Patient deemed stable for discharge to home with nurse practitioner to visit home program.         Goals of Care Treatment Preferences:  Code Status: Full Code        Consults:   Consults (From admission, onward)            Status Ordering Provider       Inpatient consult to Hematology/Oncology  Once        Provider:  Marco Alford MD    Acknowledged OPAL LITTLE       Inpatient consult to Nephrology  Once        Provider:  Oneil Otero MD    Acknowledged OPAL LITTLE       Inpatient consult to Registered Dietitian/Nutritionist  Once        Provider:  (Not yet assigned)    RICARDO Napier                No new Assessment & Plan notes have been filed under this hospital service since the last note was generated.  Service: Hospital Medicine            Final Active Diagnoses:     Diagnosis Date Noted POA    Hypertension  [I10] 06/23/2024 Yes    Class 1 obesity due to excess calories with body mass index (BMI) of 30.0 to 30.9 in adult [E66.09, Z68.30] 06/23/2024 Not Applicable    Vitamin D deficiency [E55.9] 02/04/2024 Yes    Dyslipidemia [E78.5] 01/05/2023 Yes    Type 2 diabetes mellitus [E11.9] 10/18/2021 Yes       Problems Resolved During this Admission:     Diagnosis Date Noted Date Resolved POA    PRINCIPAL PROBLEM:  Hypercalcemia [E83.52] 06/22/2024 06/26/2024 Yes    SHENA (acute kidney injury) [N17.9] 06/23/2024 06/26/2024 Yes         Discharged Condition: stable     Disposition:      Follow Up:    Follow-up Information         Briana Vu DO. Schedule an appointment as soon as possible for a visit in 3 day(s).    Specialty: Internal Medicine  Why: hospital follow up  Contact information:  [address]  [place] [id number] [phone]                    Marco Alford MD. Schedule an appointment as soon as possible for a visit in 1 week(s).    Specialty: Hematology and Oncology  Why: hospital follow up  Contact information:  [address] [facility]  [place] [address] [phone]                                   Patient Instructions:       Ambulatory referral/consult to [facility]      Diet Cardiac      Diet diabetic      Activity as tolerated         Significant Diagnostic Studies: Labs: CMP        Recent Labs   Lab 06/25/24  0536 06/26/24  0432     137 137   K 3.7  3.7 3.7     109 109   CO2 19*  19* 17*   GLU 79  79 76   BUN 13  13 13   CREATININE 1.2  1.2 1.1   CALCIUM 9.3  9.3 9.2   PROT 9.5*  --    ALBUMIN 2.5*  2.5*  2.5* 2.4*   BILITOT 0.5  --    ALKPHOS 92  --    AST 31  --    ALT 19  --    ANIONGAP 9  9 11   , CBC       Recent Labs   Lab 06/25/24  0537   WBC 4.79   HGB 12.0   HCT 37.1      , A1C:       Recent Labs   Lab 06/03/24  1252   HGBA1C 6.2*   , All labs within the past 24 hours have been reviewed, and pth within normal limits; beta 2  microglobulin 6.8; ldh 153  Radiology: Ultrasound: bilateral nonobstructing stones     Pending Diagnostic Studies:         Procedure Component Value Units Date/Time     PTH-related peptide [[id number]] Collected: 06/23/24 1214     Order Status: Sent Lab Status: In process Updated: 06/23/24 1905     Specimen: Blood       Protein electrophoresis, timed urine [[id number]] Collected: 06/25/24 1832     Order Status: Sent Lab Status: In process Updated: 06/26/24 0117     Specimen: Urine, Clean Catch               Medications:  Reconciled Home Medications:       Medication List          CHANGE how you take these medications       amLODIPine 10 MG tablet  Commonly known as: NORVASC  Take 1 tablet (10 mg total) by mouth once daily.  Start taking on: June 27, 2024  What changed:   medication strength  when to take this                CONTINUE taking these medications       azelastine 137 mcg (0.1 %) nasal spray  Commonly known as: ASTELIN  2 sprays by Nasal route 2 (two) times daily.      fluticasone propionate 50 mcg/actuation nasal spray  Commonly known as: FLONASE  by Each Nostril route daily as needed.      metFORMIN 500 MG tablet  Commonly known as: GLUCOPHAGE  Take 500 mg by mouth 2 (two) times daily with meals.      montelukast 10 mg tablet  Commonly known as: SINGULAIR  Take 10 mg by mouth once daily.                STOP taking these medications       cholecalciferol (vitamin D3) 1,250 mcg (50,000 unit) capsule      doxycycline 100 MG capsule  Commonly known as: MONODOX      losartan-hydrochlorothiazide 50-12.5 mg 50-12.5 mg per tablet  Commonly known as: HYZAAR                   Indwelling Lines/Drains at time of discharge:   Lines/Drains/Airways         None                         Time spent on the discharge of patient: 39 minutes           Tammy Granger MD  Department of Hospital Medicine  [facility]      Electronically signed by Tammy Granger MD at 6/26/2024 10:14 AM         ED to  Hosp-Admission (Discharged) on 6/22/2024            Detailed Report          Note shared with patient    Review of Systems    Outpatient Encounter Medications as of 7/11/2024   Medication Sig Dispense Refill    amLODIPine (NORVASC) 10 MG tablet Take 1 tablet (10 mg total) by mouth once daily. 30 tablet 0    fluticasone propionate (FLONASE) 50 mcg/actuation nasal spray by Each Nostril route daily as needed.      losartan (COZAAR) 50 MG tablet Take 1 tablet (50 mg total) by mouth once daily. 90 tablet 3    metFORMIN (GLUCOPHAGE) 500 MG tablet Take 500 mg by mouth 2 (two) times daily with meals.      azelastine (ASTELIN) 137 mcg (0.1 %) nasal spray 2 sprays by Nasal route 2 (two) times daily. (Patient not taking: Reported on 7/11/2024)      montelukast (SINGULAIR) 10 mg tablet Take 10 mg by mouth once daily. (Patient not taking: Reported on 7/11/2024)       No facility-administered encounter medications on file as of 7/11/2024.       The following portions of the patient's history were reviewed and updated as appropriate: She  has a past medical history of Calculus of kidney (4/9/2014 2:20:53 PM), Complications affecting other specified body systems, hypertension (3/26/2014 1:38:44 PM), Excessive or frequent menstruation (3/20/2014 9:22:15 AM), Hypertension, and Leiomyoma of uterus (3/20/2014 9:23:21 AM).  She does not have any pertinent problems on file.  She  has a past surgical history that includes Lithotripsy; Endometrial ablation; and Hysterectomy.  Her family history includes Diabetes in her father.  She  reports that she has never smoked. She has never used smokeless tobacco. She reports current alcohol use. She reports that she does not use drugs.  She has a current medication list which includes the following prescription(s): amlodipine, fluticasone propionate, losartan, metformin, azelastine, and montelukast.  Current Outpatient Medications on File Prior to Visit   Medication Sig Dispense Refill     "amLODIPine (NORVASC) 10 MG tablet Take 1 tablet (10 mg total) by mouth once daily. 30 tablet 0    fluticasone propionate (FLONASE) 50 mcg/actuation nasal spray by Each Nostril route daily as needed.      losartan (COZAAR) 50 MG tablet Take 1 tablet (50 mg total) by mouth once daily. 90 tablet 3    metFORMIN (GLUCOPHAGE) 500 MG tablet Take 500 mg by mouth 2 (two) times daily with meals.      azelastine (ASTELIN) 137 mcg (0.1 %) nasal spray 2 sprays by Nasal route 2 (two) times daily. (Patient not taking: Reported on 7/11/2024)      montelukast (SINGULAIR) 10 mg tablet Take 10 mg by mouth once daily. (Patient not taking: Reported on 7/11/2024)       No current facility-administered medications on file prior to visit.     She is allergic to tylox [oxycodone-acetaminophen]..      BP Readings from Last 3 Encounters:   07/10/24 (!) 138/94   06/26/24 135/76   05/31/24 (!) 140/90          MMRC Dyspnea Scale (4 is worst)     [] MMRC 0: Dyspneic on strenuous excercise (0 points)    [] MMRC 1: Dyspneic on walking a slight hill (0 points)    [] MMRC 2: Dyspneic on walking level ground; must stop occasionally due to breathlessness (1 point)    [] MMRC 3: Must stop for breathlessness after walking 100 yards or after a few minutes (2 points)    [] MMRC 4: Cannot leave house; breathless on dressing/undressing (3 points)                          No data to display                          Objective:     Vital Signs (Most Recent)  Height and Weight  Height: 5' 3" (160 cm)  Weight: 76.2 kg (168 lb)  BSA (Calculated - sq m): 1.84 sq meters  BMI (Calculated): 29.8  Weight in (lb) to have BMI = 25: 140.8]  Wt Readings from Last 2 Encounters:   07/11/24 76.2 kg (168 lb)   07/10/24 76.5 kg (168 lb 12.2 oz)       Physical Exam  Vitals and nursing note reviewed.   HENT:      Head: Normocephalic.   Eyes:      Pupils: Pupils are equal, round, and reactive to light.   Neurological:      Mental Status: She is alert and oriented to person, place, " "and time.          Laboratory  Lab Results   Component Value Date    WBC 4.79 06/25/2024    RBC 4.23 06/25/2024    HGB 12.0 06/25/2024    HCT 37.1 06/25/2024    MCV 88 06/25/2024    MCH 28.4 06/25/2024    MCHC 32.3 06/25/2024    RDW 13.7 06/25/2024     06/25/2024    MPV 10.4 06/25/2024    GRAN 2.6 06/25/2024    GRAN 54.0 06/25/2024    LYMPH 1.1 06/25/2024    LYMPH 22.3 06/25/2024    MONO 0.7 06/25/2024    MONO 14.2 06/25/2024    EOS 0.4 06/25/2024    BASO 0.05 06/25/2024    EOSINOPHIL 8.1 (H) 06/25/2024    BASOPHIL 1.0 06/25/2024       BMP  Lab Results   Component Value Date     06/26/2024    K 3.7 06/26/2024     06/26/2024    CO2 17 (L) 06/26/2024    BUN 13 06/26/2024    CREATININE 1.1 06/26/2024    CALCIUM 9.2 06/26/2024    ANIONGAP 11 06/26/2024    ESTGFRAFRICA 60 07/16/2021    EGFRNONAA 52 (A) 07/16/2021    AST 31 06/25/2024    ALT 19 06/25/2024    PROT 9.5 (H) 06/25/2024          No results found for: "IGE"     No results found for: "ASPERGILLUS"  No results found for: "AFUMIGATUSCL"     No results found for: "ACE"     Diagnostic Results:  I have personally reviewed today the following studies:    NM PET CT FDG Whole Body  Narrative: EXAMINATION:  NM PET CT FDG WHOLE BODY    CLINICAL HISTORY:  Hematologic malignancy, staging;Occult malignancy;  Multiple myeloma not having achieved remission    TECHNIQUE:  Segmented attenuation corrected 3-D PET imaging was obtained from the vertex of skull through the bilateral feet utilizing 11.95 mCi F-18-FDG.  Glucose level 104 mg/dL noncontrast CT imaging was performed for attenuation correction, diagnosis, and anatomical fusion with PET    COMPARISON:  None.    FINDINGS:  Head/neck: There is normal physiologic uptake noted within the visualized brain parenchyma.  There are foci of uptake seen within the skin/subcutaneous soft tissues of either cheek demonstrating an SUV max of up to 6.6 on the right and 3.5 on the left which may be representative " metastatic disease.  There is also an FDG avid submental lymph node that demonstrates an SUV max of 5.3.  FDG avid lesion within the left lobe of the thyroid gland demonstrating SUV max of 5.1.  FDG avid lymph node deep to the left lobe of the thyroid gland demonstrating an SUV max of 3.8.  Single FDG avid lymph nodes seen in the supraclavicular region bilaterally demonstrating an SUV max of 4.2 on the right and 2.7 on the left.    Chest: No FDG avid pulmonary nodules/masses. Numerous FDG avid superior mediastinal lymph nodes including the right paratracheal region demonstrating SUV max of up to 4.8.  FDG avid prevascular lymph nodes demonstrate an SUV max of up to 54.8.  Subcarinal lymphadenopathy demonstrate an SUV max of 4.1.  FDG avid lymph nodes in the right hilar region/suprahilar region demonstrating an SUV max of up to 8.2 and FDG avid left hilar lymph nodes demonstrate a SUV max of up to 4.8.    Abdomen/Pelvis: Normal physiologic uptake noted within the liver, spleen, urinary tract, and bowel.The adrenals are unremarkable in appearance.  There is diverticulosis of the sigmoid colon.  FDG avid lymph nodes seen in either inguinal region demonstrating SUV max of up to 5.1 on the right and 5.6 on the left.  FDG avid external iliac chain lymph nodes on the left demonstrating an SUV max of up to 3.6.  FDG avid right external iliac/obturator lymph node demonstrating SUV max of up to 3.1.  Mildly FDG avid presumed lymph nodes within the upper abdomen adjacent to the left side of the aorta demonstrating an SUV max of up to 2.9.  The spleen is enlarged.    Skeletal/soft tissues: There is also an FDG avid lymph node seen posterior and medial to the distal shaft of the left femur along the course of the distal superficial femoral artery/vein demonstrating an SUV max of up to 3.1.  Small FDG avid soft tissue nodule seen adjacent to the lateral aspect of the patella inferiorly on the left demonstrate an SUV max of 2.8.   Multiple foci of prominent uptake associated with the calf musculature bilaterally suspicious for disease demonstrating SUV max of up to 7.1 on the right and 7.4 on the left.  Impression: 1. Multiple sites of abnormal FDG uptake seen within the soft tissues of the neck, face, left lobe of the thyroid gland, mediastinal and bilateral hilar adenopathy along with some FDG avid lymph nodes seen within the abdomen and pelvis and bilateral inguinal regions and single FDG avid lymph node seen adjacent to the distal femur along the course of the superficial femoral artery on the left.  There is also a single FDG avid lesion within the soft tissues inferior and posterior to the patella on the left.  Extensive uptake seen within the musculature of the calf regions bilaterally consistent with disease.  Enlarged spleen.    Electronically signed by: Weston Ferreira DO  Date:    07/08/2024  Time:    15:21     EKG  Sinus tachy  Prolonged QT  Assessment/Plan:     Problem List Items Addressed This Visit       Abnormal PET scan of mediastinum - Primary    Relevant Orders    ANGIOTENSIN CONVERTING ENZYME    Interleukin-2 Receptor    Case Request Endoscopy: Bronchoscopy - insert lighted tube into airway to take a biopsy of lung, ENDOBRONCHIAL ULTRASOUND (EBUS) (Completed)     Other Visit Diagnoses       Multiple myeloma not having achieved remission        Relevant Orders    Case Request Endoscopy: Bronchoscopy - insert lighted tube into airway to take a biopsy of lung, ENDOBRONCHIAL ULTRASOUND (EBUS) (Completed)             My diagnostic impression and work-up plan were discussed at length with patient. Risks were discussed. EBUS procedure. Complications of the procedure discussed in detail with patient. Complications including but not limited to infection that may require hospital admission, bleeding that may require blood transfusion and or hospital admission, perforation of the lung which may require surgery,chance of injury to the  throat, windpipe or bronchial tubes, laryngospam, coughing, aspiration, hypoxemia or cardiac arrythmias. Patient expressed and verbalized understanding. The material risks of anesthesia in connection with the procedure including brain damage, paralysis from the neck downwards, paralysis from the waist downwards, loss of function of an arm or a leg, disfigurement (incluing scars)and death were discussed with patient who expressedand verbalized understanding. Alternate treatments and material risks associated with such alternatives were discussed with pateint. These include radiologic surveillance with minimal risk and sugery with an indeterminate risk. The material risks of refusing the procedure was discussed in detail. This includes no diagnosis or confirmation of diagnisis and rendering of appropriate treatment the risk of which depends on the nature of the diagnosed illness. Patient expressed and verbalized understanding. Procedure scheduled for date July 17th 2024. Consent signed. Orders entered. Coagulation studies per orders.   All questions were answered and the patient expressed understanding    Stations: 4R, 7, 10/11 R, 10/11 L     Follow up in about 4 weeks (around 8/8/2024), or EBUS consent, labs,.    This note was prepared using voice recognition system and is likely to have sound alike errors that may have been overlooked even after proof reading.  Please call me with any questions    Discussed diagnosis, its evaluation, treatment and usual course. All questions answered.    Thank you for the courtesy of participating in the care of this patient    Liang Lu MD      Personal Diagnostic Review  []  CXR    []  ECHO    []  ONSAT    []  6MWD    []  LABS    []  CHEST CT    []  PET CT    []  Biopsy results

## 2024-07-11 NOTE — TELEPHONE ENCOUNTER
----- Message from Kay Tavera MA sent at 7/10/2024  4:00 PM CDT -----    ----- Message -----  From: Liang Lu MD  Sent: 7/10/2024   3:57 PM CDT  To: Aly Tavera Staff    Need to see patient to discuss EBUS  ----- Message -----  From: Marco Alford MD  Sent: 7/10/2024   3:27 PM CDT  To: Liang Lu MD; Briana Vu DO; #    Great thank you very much please proceed  ----- Message -----  From: Liang Lu MD  Sent: 7/10/2024   2:27 PM CDT  To: Marco Alford MD; Briana Vu DO; #    She is candidate for EBUS, can send also for flow cytometry  ----- Message -----  From: Marco Alford MD  Sent: 7/10/2024   2:00 PM CDT  To: Liang Lu MD; Briana Vu DO; #    Reviewed results of PET scan with patient and family patient has subcutaneous nodules on face possible hypercalcemia with marked abnormalities with M spike.  At this time would recommend patient proceed with bone marrow biopsy and aspirate and biopsies of hypermetabolic area in lung as well as subcutaneous nodules on skin.  I will present at multidisciplinary tumor conference this week for review in proceed follow-up next week with CMP to make sure calcium level remains stable.  Discussed implications with her daughters in reviewed images personally told high likelihood either lymphoma in multiple myeloma wishes highly treatable although not curable           Marco Alford Jr, MD FACP     Umer; I AM GOING TO PRESENT THE CASE ON FRIDAY I CONFERENCE BUT I AM GOING TO SEE IF YOU WOULD REVIEW AND SEE ABOUT BRONCHOSCOPY  DEBBIE, THE PATIENT HAS SEVERAL SUBCUTANEOUS NODULES 1 UNDERNEATH HER CHIN I WOULD LIKE TO SEE IF YOU WOULD EVALUATE POSSIBLE REMOVAL BIOPSY  
Spoke with pt. [place]alt scheduled.   
162

## 2024-07-12 ENCOUNTER — TUMOR BOARD CONFERENCE (OUTPATIENT)
Dept: HEMATOLOGY/ONCOLOGY | Facility: CLINIC | Age: 54
End: 2024-07-12
Payer: COMMERCIAL

## 2024-07-12 ENCOUNTER — PATIENT MESSAGE (OUTPATIENT)
Dept: HEMATOLOGY/ONCOLOGY | Facility: CLINIC | Age: 54
End: 2024-07-12
Payer: COMMERCIAL

## 2024-07-12 NOTE — PROGRESS NOTES
Tumor Board Documentation      Rosibel Herrera was presented by Marco Alford MD at our Tumor Board on 7/12/2024, which included representatives from Medical Oncology, Hematology, Radiation Oncology, Surgical Oncology, Pathology, Navigation, Radiology, Gastrointestinal, Pulmonology, Urology, Nutrition.    Rosibel currently presents as a new patient with Multiple myeloma, with history of the following treatments:  .    Additionally, we reviewed previous medical and familial history, history of present illness, and recent lab results along with all available histopathologic and imaging studies. The tumor board considered available treatment options and made the following recommendations:    proceed with planned bone marrow biopsy and biopsies of hypermetabolic area in lung as well as subcutaneous nodules on skin, with referrals made to both Surgical Oncology and Pulmonary        The following procedures/referrals were also placed: No orders of the defined types were placed in this encounter.      Clinical Trial Status:       National site-specific guidelines were discussed with respect to the case.    Tumor board is a meeting of clinicians from various specialty areas who evaluate and discuss patients for whom a multidisciplinary approach is being considered. Final determinations in the plan of care are those of the provider(s). The responsibility for follow up of recommendations given during tumor board is that of the provider.     Marco Alford MD    
BENJAMÍN Sampson made aware

## 2024-07-15 ENCOUNTER — OFFICE VISIT (OUTPATIENT)
Dept: HEMATOLOGY/ONCOLOGY | Facility: CLINIC | Age: 54
End: 2024-07-15
Payer: COMMERCIAL

## 2024-07-15 ENCOUNTER — TELEPHONE (OUTPATIENT)
Dept: RADIOLOGY | Facility: HOSPITAL | Age: 54
End: 2024-07-15
Payer: COMMERCIAL

## 2024-07-15 VITALS
DIASTOLIC BLOOD PRESSURE: 93 MMHG | TEMPERATURE: 97 F | HEIGHT: 63 IN | HEART RATE: 89 BPM | OXYGEN SATURATION: 98 % | WEIGHT: 167.75 LBS | BODY MASS INDEX: 29.72 KG/M2 | SYSTOLIC BLOOD PRESSURE: 145 MMHG

## 2024-07-15 DIAGNOSIS — N20.0 KIDNEY STONE: ICD-10-CM

## 2024-07-15 DIAGNOSIS — E83.52 HYPERCALCEMIA: ICD-10-CM

## 2024-07-15 DIAGNOSIS — R94.8 ABNORMAL PET SCAN OF MEDIASTINUM: Primary | ICD-10-CM

## 2024-07-15 DIAGNOSIS — I10 PRIMARY HYPERTENSION: ICD-10-CM

## 2024-07-15 DIAGNOSIS — E78.5 DYSLIPIDEMIA: ICD-10-CM

## 2024-07-15 PROCEDURE — 3077F SYST BP >= 140 MM HG: CPT | Mod: CPTII,S$GLB,, | Performed by: INTERNAL MEDICINE

## 2024-07-15 PROCEDURE — 3044F HG A1C LEVEL LT 7.0%: CPT | Mod: CPTII,S$GLB,, | Performed by: INTERNAL MEDICINE

## 2024-07-15 PROCEDURE — 1159F MED LIST DOCD IN RCRD: CPT | Mod: CPTII,S$GLB,, | Performed by: INTERNAL MEDICINE

## 2024-07-15 PROCEDURE — 3008F BODY MASS INDEX DOCD: CPT | Mod: CPTII,S$GLB,, | Performed by: INTERNAL MEDICINE

## 2024-07-15 PROCEDURE — 99999 PR PBB SHADOW E&M-EST. PATIENT-LVL III: CPT | Mod: PBBFAC,,, | Performed by: INTERNAL MEDICINE

## 2024-07-15 PROCEDURE — 4010F ACE/ARB THERAPY RXD/TAKEN: CPT | Mod: CPTII,S$GLB,, | Performed by: INTERNAL MEDICINE

## 2024-07-15 PROCEDURE — 99214 OFFICE O/P EST MOD 30 MIN: CPT | Mod: S$GLB,,, | Performed by: INTERNAL MEDICINE

## 2024-07-15 PROCEDURE — 1160F RVW MEDS BY RX/DR IN RCRD: CPT | Mod: CPTII,S$GLB,, | Performed by: INTERNAL MEDICINE

## 2024-07-15 PROCEDURE — 1111F DSCHRG MED/CURRENT MED MERGE: CPT | Mod: CPTII,S$GLB,, | Performed by: INTERNAL MEDICINE

## 2024-07-15 PROCEDURE — 3080F DIAST BP >= 90 MM HG: CPT | Mod: CPTII,S$GLB,, | Performed by: INTERNAL MEDICINE

## 2024-07-15 NOTE — TELEPHONE ENCOUNTER
Interventional Radiology  Scheduled 9:30AM bone marrow biopsy for 7/22/24 w/patient.  Instructed pt. to arrive by 8:00AM to the hospital ([address]/ Entrance 2) off [address] in [place] and check in at Outpatient Registration located on the first floor.  She must have a ride and NPO after midnight the night before.  I explained that she can take her regular morning medication with a small sip of water.  Pt. denies taking ASA and other blood thinners, NSAIDS, fish oil, or GLP-1/GIP agonists.  She verbalized understanding of all instructions.

## 2024-07-15 NOTE — PROGRESS NOTES
Subjective:       Patient ID: Rosibel Herrera is a 54 y.o. female.    Chief Complaint: Results    HPI:  54-year-old female returns for review of results of multidisciplinary tumor conference.  Patient has abnormal PET scan which reveals a mediastinal mass subcutaneous nodules hypercalcemia.  Patient is here for review    Past Medical History:   Diagnosis Date    Calculus of kidney 4/9/2014 2:20:53 PM    [facility] - Urology: KidneyStones-No Additional Notes    Complications affecting other specified body systems, hypertension 3/26/2014 1:38:44 PM    [facility] - Cardiovascular: Hypertension-No Additional Notes    Excessive or frequent menstruation 3/20/2014 9:22:15 AM    [facility]: Bleeding, Menorrhagia-No Additional Notes    Hypertension     Leiomyoma of uterus 3/20/2014 9:23:21 AM    [facility] - [facility]: Fibroids/Leiomyoma of Uterus-2.1x1.8cm (POST/LT); 3.9x3.5cm (ANT TO ENDO)     Family History   Problem Relation Name Age of Onset    Diabetes Father       Social History     Socioeconomic History    Marital status:    Tobacco Use    Smoking status: Never    Smokeless tobacco: Never   Substance and Sexual Activity    Alcohol use: Yes     Comment: rarely    Drug use: No    Sexual activity: Yes     Partners: Male     Birth control/protection: Post-menopausal     Social Determinants of Health     Financial Resource Strain: Low Risk  (5/30/2024)    Overall Financial Resource Strain (CARDIA)     Difficulty of Paying Living Expenses: Not hard at all   Food Insecurity: No Food Insecurity (5/30/2024)    Hunger Vital Sign     Worried About Running Out of Food in the Last Year: Never true     Ran Out of Food in the Last Year: Never true   Physical Activity: Insufficiently Active (5/30/2024)    Exercise Vital Sign     Days of Exercise per Week: 2 days     Minutes of Exercise per Session: 30 min   Stress: No Stress Concern Present (5/30/2024)    [place]  "[place] [employer] - Occupational Stress Questionnaire     Feeling of Stress : Only a little   Housing Stability: Unknown (5/30/2024)    Housing Stability Vital Sign     Unable to Pay for Housing in the Last Year: No     Past Surgical History:   Procedure Laterality Date    ENDOMETRIAL ABLATION      HYSTERECTOMY      fibroids    LITHOTRIPSY         Labs:  Lab Results   Component Value Date    WBC 4.79 06/25/2024    HGB 12.0 06/25/2024    HCT 37.1 06/25/2024    MCV 88 06/25/2024     06/25/2024     BMP  Lab Results   Component Value Date     06/26/2024    K 3.7 06/26/2024     06/26/2024    CO2 17 (L) 06/26/2024    BUN 13 06/26/2024    CREATININE 1.1 06/26/2024    CALCIUM 9.2 06/26/2024    ANIONGAP 11 06/26/2024    ESTGFRAFRICA 60 07/16/2021    EGFRNONAA 52 (A) 07/16/2021     Lab Results   Component Value Date    ALT 19 06/25/2024    AST 31 06/25/2024    ALKPHOS 92 06/25/2024    BILITOT 0.5 06/25/2024       No results found for: "IRON", "TIBC", "FERRITIN", "SATURATEDIRO"  No results found for: "QSDJEJMH80"  No results found for: "FOLATE"  Lab Results   Component Value Date    TSH 2.015 06/03/2024         Review of Systems   Constitutional:  Negative for activity change, appetite change, chills, diaphoresis, fatigue, fever and unexpected weight change.   HENT:  Negative for congestion, dental problem, drooling, ear discharge, ear pain, facial swelling, hearing loss, mouth sores, nosebleeds, postnasal drip, rhinorrhea, sinus pressure, sneezing, sore throat, tinnitus, trouble swallowing and voice change.    Eyes:  Negative for photophobia, pain, discharge, redness, itching and visual disturbance.   Respiratory:  Negative for cough, choking, chest tightness, shortness of breath, wheezing and stridor.    Cardiovascular:  Negative for chest pain, palpitations and leg swelling.   Gastrointestinal:  Negative for abdominal distention, abdominal pain, anal bleeding, blood in stool, constipation, " diarrhea, nausea, rectal pain and vomiting.   Endocrine: Negative for cold intolerance, heat intolerance, polydipsia, polyphagia and polyuria.   Genitourinary:  Negative for decreased urine volume, difficulty urinating, dyspareunia, dysuria, enuresis, flank pain, frequency, genital sores, hematuria, menstrual problem, pelvic pain, urgency, vaginal bleeding, vaginal discharge and vaginal pain.   Musculoskeletal:  Negative for arthralgias, back pain, gait problem, joint swelling, myalgias, neck pain and neck stiffness.   Skin:  Negative for color change, pallor and rash.   Allergic/Immunologic: Negative for environmental allergies, food allergies and immunocompromised state.   Neurological:  Negative for dizziness, tremors, seizures, syncope, facial asymmetry, speech difficulty, weakness, light-headedness, numbness and headaches.   Hematological:  Negative for adenopathy. Does not bruise/bleed easily.   Psychiatric/Behavioral:  Negative for agitation, behavioral problems, confusion, decreased concentration, dysphoric mood, hallucinations, self-injury, sleep disturbance and suicidal ideas. The patient is not nervous/anxious and is not hyperactive.        Objective:      Physical Exam  Vitals reviewed.   Constitutional:       General: She is not in acute distress.     Appearance: She is well-developed. She is not diaphoretic.   HENT:      Head: Normocephalic and atraumatic.      Right Ear: External ear normal.      Left Ear: External ear normal.      Nose: Nose normal.      Right Sinus: No maxillary sinus tenderness or frontal sinus tenderness.      Left Sinus: No maxillary sinus tenderness or frontal sinus tenderness.      Mouth/Throat:      Pharynx: No oropharyngeal exudate.   Eyes:      General: Lids are normal. No scleral icterus.        Right eye: No discharge.         Left eye: No discharge.      Conjunctiva/sclera: Conjunctivae normal.      Right eye: Right conjunctiva is not injected. No hemorrhage.     Left  eye: Left conjunctiva is not injected. No hemorrhage.     Pupils: Pupils are equal, round, and reactive to light.   Neck:      Thyroid: No thyromegaly.      Vascular: No JVD.      Trachea: No tracheal deviation.   Cardiovascular:      Rate and Rhythm: Normal rate.   Pulmonary:      Effort: Pulmonary effort is normal. No respiratory distress.      Breath sounds: No stridor.   Chest:      Chest wall: No tenderness.   Abdominal:      General: Bowel sounds are normal. There is no distension.      Palpations: Abdomen is soft. There is no hepatomegaly, splenomegaly or mass.      Tenderness: There is no abdominal tenderness. There is no rebound.   Musculoskeletal:         General: No tenderness. Normal range of motion.      Cervical back: Normal range of motion and neck supple.   Lymphadenopathy:      Cervical: No cervical adenopathy.      Upper Body:      Right upper body: No supraclavicular adenopathy.      Left upper body: No supraclavicular adenopathy.   Skin:     General: Skin is dry.      Findings: No erythema or rash.   Neurological:      Mental Status: She is alert and oriented to person, place, and time.      Cranial Nerves: No cranial nerve deficit.      Coordination: Coordination normal.   Psychiatric:         Behavior: Behavior normal.         Thought Content: Thought content normal.         Judgment: Judgment normal.             Assessment:      1. Abnormal PET scan of mediastinum    2. Hypercalcemia    3. Kidney stone    4. Primary hypertension    5. Dyslipidemia           Med Onc Chart Routing      Follow up with physician . Return to clinic to see me after results of biopsy   Follow up with ALONA    Infusion scheduling note    Injection scheduling note    Labs CBC and CMP   Scheduling:  Preferred lab:  Lab interval: once a week     Imaging    Pharmacy appointment    Other referrals                   Plan:     Patient here with daughter reviewed results of multidisciplinary tumor conference.  The possibility  of sarcoidosis was discussed.  At this time would recommend that patient have CBC CMP done on a weekly basis waiting results of biopsy skin mediastinum in ultimately bone marrow for review discussed implications of answered questions        Marco Alford Jr, MD FACP

## 2024-07-16 ENCOUNTER — LAB VISIT (OUTPATIENT)
Dept: LAB | Facility: HOSPITAL | Age: 54
End: 2024-07-16
Attending: INTERNAL MEDICINE
Payer: COMMERCIAL

## 2024-07-16 ENCOUNTER — OFFICE VISIT (OUTPATIENT)
Dept: SURGICAL ONCOLOGY | Facility: CLINIC | Age: 54
End: 2024-07-16
Payer: COMMERCIAL

## 2024-07-16 VITALS
DIASTOLIC BLOOD PRESSURE: 86 MMHG | HEIGHT: 63 IN | HEART RATE: 92 BPM | BODY MASS INDEX: 29.38 KG/M2 | TEMPERATURE: 98 F | SYSTOLIC BLOOD PRESSURE: 131 MMHG | WEIGHT: 165.81 LBS

## 2024-07-16 DIAGNOSIS — E83.52 HYPERCALCEMIA: Primary | ICD-10-CM

## 2024-07-16 DIAGNOSIS — C90.00 MULTIPLE MYELOMA NOT HAVING ACHIEVED REMISSION: ICD-10-CM

## 2024-07-16 LAB
ALBUMIN SERPL BCP-MCNC: 3 G/DL (ref 3.5–5.2)
ALP SERPL-CCNC: 117 U/L (ref 55–135)
ALT SERPL W/O P-5'-P-CCNC: 17 U/L (ref 10–44)
ANION GAP SERPL CALC-SCNC: 10 MMOL/L (ref 8–16)
AST SERPL-CCNC: 32 U/L (ref 10–40)
BASOPHILS # BLD AUTO: 0.04 K/UL (ref 0–0.2)
BASOPHILS NFR BLD: 0.7 % (ref 0–1.9)
BILIRUB SERPL-MCNC: 0.5 MG/DL (ref 0.1–1)
BUN SERPL-MCNC: 19 MG/DL (ref 6–20)
CALCIUM SERPL-MCNC: 10.1 MG/DL (ref 8.7–10.5)
CHLORIDE SERPL-SCNC: 104 MMOL/L (ref 95–110)
CO2 SERPL-SCNC: 21 MMOL/L (ref 23–29)
CREAT SERPL-MCNC: 1.4 MG/DL (ref 0.5–1.4)
DIFFERENTIAL METHOD BLD: ABNORMAL
EOSINOPHIL # BLD AUTO: 0.4 K/UL (ref 0–0.5)
EOSINOPHIL NFR BLD: 7.9 % (ref 0–8)
ERYTHROCYTE [DISTWIDTH] IN BLOOD BY AUTOMATED COUNT: 14.3 % (ref 11.5–14.5)
EST. GFR  (NO RACE VARIABLE): 44 ML/MIN/1.73 M^2
GLUCOSE SERPL-MCNC: 108 MG/DL (ref 70–110)
HCT VFR BLD AUTO: 38.6 % (ref 37–48.5)
HGB BLD-MCNC: 12.6 G/DL (ref 12–16)
IMM GRANULOCYTES # BLD AUTO: 0.02 K/UL (ref 0–0.04)
IMM GRANULOCYTES NFR BLD AUTO: 0.4 % (ref 0–0.5)
LYMPHOCYTES # BLD AUTO: 1.3 K/UL (ref 1–4.8)
LYMPHOCYTES NFR BLD: 24.3 % (ref 18–48)
MCH RBC QN AUTO: 28.8 PG (ref 27–31)
MCHC RBC AUTO-ENTMCNC: 32.6 G/DL (ref 32–36)
MCV RBC AUTO: 88 FL (ref 82–98)
MONOCYTES # BLD AUTO: 0.8 K/UL (ref 0.3–1)
MONOCYTES NFR BLD: 15.3 % (ref 4–15)
NEUTROPHILS # BLD AUTO: 2.8 K/UL (ref 1.8–7.7)
NEUTROPHILS NFR BLD: 51.4 % (ref 38–73)
NRBC BLD-RTO: 0 /100 WBC
PLATELET # BLD AUTO: 376 K/UL (ref 150–450)
PMV BLD AUTO: 10.1 FL (ref 9.2–12.9)
POTASSIUM SERPL-SCNC: 4.7 MMOL/L (ref 3.5–5.1)
PROT SERPL-MCNC: 11 G/DL (ref 6–8.4)
RBC # BLD AUTO: 4.37 M/UL (ref 4–5.4)
SODIUM SERPL-SCNC: 135 MMOL/L (ref 136–145)
URATE SERPL-MCNC: 6.9 MG/DL (ref 2.4–5.7)
WBC # BLD AUTO: 5.43 K/UL (ref 3.9–12.7)

## 2024-07-16 PROCEDURE — 4010F ACE/ARB THERAPY RXD/TAKEN: CPT | Mod: CPTII,S$GLB,, | Performed by: SURGERY

## 2024-07-16 PROCEDURE — 99999 PR PBB SHADOW E&M-EST. PATIENT-LVL III: CPT | Mod: PBBFAC,,, | Performed by: SURGERY

## 2024-07-16 PROCEDURE — 3075F SYST BP GE 130 - 139MM HG: CPT | Mod: CPTII,S$GLB,, | Performed by: SURGERY

## 2024-07-16 PROCEDURE — 99205 OFFICE O/P NEW HI 60 MIN: CPT | Mod: S$GLB,,, | Performed by: SURGERY

## 2024-07-16 PROCEDURE — 84550 ASSAY OF BLOOD/URIC ACID: CPT | Performed by: INTERNAL MEDICINE

## 2024-07-16 PROCEDURE — 3079F DIAST BP 80-89 MM HG: CPT | Mod: CPTII,S$GLB,, | Performed by: SURGERY

## 2024-07-16 PROCEDURE — 36415 COLL VENOUS BLD VENIPUNCTURE: CPT | Performed by: INTERNAL MEDICINE

## 2024-07-16 PROCEDURE — 85025 COMPLETE CBC W/AUTO DIFF WBC: CPT | Performed by: INTERNAL MEDICINE

## 2024-07-16 PROCEDURE — 3044F HG A1C LEVEL LT 7.0%: CPT | Mod: CPTII,S$GLB,, | Performed by: SURGERY

## 2024-07-16 PROCEDURE — 80053 COMPREHEN METABOLIC PANEL: CPT | Performed by: INTERNAL MEDICINE

## 2024-07-16 PROCEDURE — 1111F DSCHRG MED/CURRENT MED MERGE: CPT | Mod: CPTII,S$GLB,, | Performed by: SURGERY

## 2024-07-16 PROCEDURE — 3008F BODY MASS INDEX DOCD: CPT | Mod: CPTII,S$GLB,, | Performed by: SURGERY

## 2024-07-16 NOTE — H&P (VIEW-ONLY)
Surgical Oncology Clinic Note      Referring Provider: Dr. Marco Alford   PCP: Briana Vu DO    Reason For Visit: lymphadenopathy    HISTORY OF PRESENT ILLNESS       Rosibel Herrera is a 54 y.o. female with history of discoid lupus who presents today for evaluation and management of cutaneous lesion/hypercalcemia of unknown etiology.    She was in her usual state of good health up until June of this year.  She had been kind of having general fatigue in not feeling quite right and presented to Dr. Vu to establish care with a PCP.  As part of the workup routine lab work was done which showed an elevation in her calcium up to 12.  There was concern for dehydration and obviously electrolyte abnormalities and as such she was referred to nephrology.  Additional labs were done in preparation for a nephrology appointment and her calcium was again 12.1, with a corrected calcium of 13.1 and she was fatigued therefore she was instructed to present to the emergency department by the nephrologist on-call.  She presented to the ED here at [facility] and was subsequently admitted to the hospital medicine service.  During that admission she was found to have abnormal kappa/lambda light chains and persistent hypercalcemia despite adequate fluid resuscitation.  She was seen by Dr. Alford with Medical Oncology and told she likely there has lymphoma or multiple myeloma.  She was given bisphosphonates with a good response and her calcium levels returned to normal.  She was subsequently discharged home and then had a PET-CT scan done as an outpatient.  That PET-CT scan on 07/08/2024 showed multiple sites of abnormal FDG uptake within the soft tissues of the neck, face, left lobe of the thyroid, mediastinal and bilateral hilar lymphadenopathy, as well as some FDG avid lymph nodes within the abdomen pelvis, and bilateral inguinal regions.  She also has a questionable area of FDG avidity in the distal femur as  well as within the soft tissues inferior and posterior to the patella on the left.  She also had extensive uptake within the musculature of the calf bilaterally and an enlarged spleen.  She was discussed at multidisciplinary tumor board and was referred to myself as well as Dr. Lu for biopsies to obtain a pathologic diagnosis.  She will be undergoing an EBUS tomorrow for biopsy.  She was referred to me for biopsy of the subcutaneous nodules on her skin.    She states she 1st noticed these nodules on her right cheek, left cheek and, under her chin about 6 months ago maybe less.  The 1 on her chin has gotten harder and she also states that the 1 on her cheek is swollen up and then at 1 point in time 1st and then kind of came back to a little nodule.  She was seen by an outside dermatologist who lanced it however it returned.  She was subsequently seen by Dr. Dina madison here with Dermatology who treated for her acne but did state that if they did not improve she would need additional workup due to the nature of the lesions.  She also notes little nodule in her left forearm that has not really changed in size.  As mentioned she does have a history of discoid lupus the skin on her face since 1986.  Was the area just to the right of her right nasal ala.  She said she has never had any issues since that point in time and was told by somebody previously that since she had no problems it probably would not become systemic.    She does express some concern over the financial implications that all of this has had on her.  She works in does asked to recovery and has had a lot of out of pocket cost associated with her recent hospitalization as well as the extensive workup.  She does have daughters who live locally and do normally come to her appointment with her however are not with her today.  She does note good family support    Past Medical History:   Diagnosis Date    Calculus of kidney 4/9/2014 2:20:53 PM    [facility] - Urology: KidneyStones-No Additional Notes    Class 1 obesity due to excess calories with body mass index (BMI) of 30.0 to 30.9 in adult 06/23/2024    Complications affecting other specified body systems, hypertension 3/26/2014 1:38:44 PM    [facility] - Cardiovascular: Hypertension-No Additional Notes    Dyslipidemia 01/05/2023    Excessive or frequent menstruation 3/20/2014 9:22:15 AM    [facility]: Bleeding, Menorrhagia-No Additional Notes    Hypertension     Leiomyoma of uterus 3/20/2014 9:23:21 AM    [facility] - [facility]: Fibroids/Leiomyoma of Uterus-2.1x1.8cm (POST/LT); 3.9x3.5cm (ANT TO ENDO)    Type 2 diabetes mellitus 10/18/2021       Past Surgical History:   Procedure Laterality Date    ENDOMETRIAL ABLATION      HYSTERECTOMY      fibroids    LITHOTRIPSY         Family History   Problem Relation Name Age of Onset    Diabetes Father         Social History     Socioeconomic History    Marital status:    Tobacco Use    Smoking status: Never    Smokeless tobacco: Never   Substance and Sexual Activity    Alcohol use: Yes     Comment: rarely    Drug use: No    Sexual activity: Yes     Partners: Male     Birth control/protection: Post-menopausal     Social Determinants of Health     Financial Resource Strain: Low Risk  (5/30/2024)    Overall Financial Resource Strain (CARDIA)     Difficulty of Paying Living Expenses: Not hard at all   Food Insecurity: No Food Insecurity (5/30/2024)    Hunger Vital Sign     Worried About Running Out of Food in the Last Year: Never true     Ran Out of Food in the Last Year: Never true   Physical Activity: Insufficiently Active (5/30/2024)    Exercise Vital Sign     Days of Exercise per Week: 2 days     Minutes of Exercise per Session: 30 min   Stress: No Stress Concern Present (5/30/2024)    [place] [employer] - Occupational Stress Questionnaire     Feeling of Stress : Only a little    Housing Stability: Unknown (5/30/2024)    Housing Stability Vital Sign     Unable to Pay for Housing in the Last Year: No          Medication List            Accurate as of July 16, 2024 11:59 PM. If you have any questions, ask your nurse or doctor.                CONTINUE taking these medications      amLODIPine 10 MG tablet  Commonly known as: NORVASC  Take 1 tablet (10 mg total) by mouth once daily.     azelastine 137 mcg (0.1 %) nasal spray  Commonly known as: ASTELIN     fluticasone propionate 50 mcg/actuation nasal spray  Commonly known as: FLONASE     losartan 50 MG tablet  Commonly known as: COZAAR  Take 1 tablet (50 mg total) by mouth once daily.     metFORMIN 500 MG tablet  Commonly known as: GLUCOPHAGE     montelukast 10 mg tablet  Commonly known as: SINGULAIR              Review of patient's allergies indicates:   Allergen Reactions    Tylox [oxycodone-acetaminophen]        Review of Systems   Constitutional:  Negative for chills, fever, malaise/fatigue and weight loss.   HENT: Negative.     Respiratory:  Negative for cough and shortness of breath.    Cardiovascular:  Negative for chest pain, palpitations and orthopnea.   Gastrointestinal:  Negative for abdominal pain, blood in stool, constipation, diarrhea, nausea and vomiting.   Skin: Negative.  Negative for rash.         Vitals:    07/16/24 1528   BP: 131/86   Pulse: 92   Temp: 98 °F (36.7 °C)     Body mass index is 29.37 kg/m².  ECOG SCORE    0 - Fully active-able to carry on all pre-disease performance without restriction         PHYSICAL EXAM       Physical Exam  Vitals reviewed.   Constitutional:       General: She is not in acute distress.     Appearance: Normal appearance.   HENT:      Head: Normocephalic and atraumatic.        Comments: Firm lesions on the right cheek, underneath the chin, and smaller lesion on the left cheek     Mouth/Throat:      Mouth: Mucous membranes are moist.   Eyes:      General: No scleral icterus.     Extraocular  "Movements: Extraocular movements intact.      Conjunctiva/sclera: Conjunctivae normal.   Pulmonary:      Effort: Pulmonary effort is normal.   Abdominal:      General: There is no distension.      Palpations: Abdomen is soft. There is no mass.      Tenderness: There is no abdominal tenderness.   Musculoskeletal:         General: No swelling. Normal range of motion.   Skin:     General: Skin is warm and dry.   Neurological:      General: No focal deficit present.      Mental Status: She is alert.   Psychiatric:         Mood and Affect: Mood normal.         Behavior: Behavior normal.         Thought Content: Thought content normal.         Judgment: Judgment normal.          DATA REVIEW:  I personally reviewed the following records for this visit: lab work from prior visit, notes from other physicians, radiographic study evaluation, and laboratory results done by primary care physician    LABS       Lab Results   Component Value Date    WBC 5.43 07/16/2024    HGB 12.6 07/16/2024    HCT 38.6 07/16/2024     07/16/2024     Lab Results   Component Value Date     07/16/2024    CALCIUM 10.1 07/16/2024    ALBUMIN 3.0 (L) 07/16/2024    PROT 11.0 (H) 07/16/2024     (L) 07/16/2024    K 4.7 07/16/2024    CO2 21 (L) 07/16/2024     07/16/2024    BUN 19 07/16/2024    CREATININE 1.4 07/16/2024    ALKPHOS 117 07/16/2024    ALT 17 07/16/2024    AST 32 07/16/2024    BILITOT 0.5 07/16/2024       Nutritional:  No results found for: "PREALBUMIN"    Tumor Markers:  No results found for: "CEA", "AMYLASE", "ASPIRATE", "VWU355", "", "LM3230", "AFP", "AFPTM"  No results found for: ""    PATHOLOGY     None    IMAGING     07/08/2024:  NM PET Scan  Impression:   1. Multiple sites of abnormal FDG uptake seen within the soft tissues of the neck, face, left lobe of the thyroid gland, mediastinal and bilateral hilar adenopathy along with some FDG avid lymph nodes seen within the abdomen and pelvis and bilateral " inguinal regions and single FDG avid lymph node seen adjacent to the distal femur along the course of the superficial femoral artery on the left.  There is also a single FDG avid lesion within the soft tissues inferior and posterior to the patella on the left.  Extensive uptake seen within the musculature of the calf regions bilaterally consistent with disease.  Enlarged spleen.      ASSESSMENT & PLAN     1. Hypercalcemia    2. Multiple myeloma not having achieved remission       Rosibel Herrera is a 54 y.o. female with hypercalcemia of malignancy of unknown origin.  Based on imaging and current work up differential includes multiple myeloma vs lymphoma vs sarcoidosis vs other.  She is having an endobronchial biopsy by Dr. Lu tomorrow and a bone marrow biopsy tomorrow.  I discussed with her that I could easily do a biopsy of her skin.  She is concerned about the additional costs of procedures, which is certainly reasonable.  We discussed that it is possible that she will get a pathologic diagnosis from these other biopsies, which would render a cutaneous biopsy irrelevant.  But if those are negative, waiting to biopsy will delay her diagnosis and treatment.  At this time she would like to wait for the cutaneous biopsy.      I am happy to see her back for a procedure visit only and biopsy under her chin if her other biopsies are non diagnostic.  Pt instructed to call if there are any issues.      Ms. Herrera expressed understanding in regards to our discussion today. Many good questions were asked on today's visit, all of which were answered to their satisfaction.    Follow-up: Follow up if other biopsies are non-diagnostic.                  Pamela Rojas MD MS              Surgical Oncology              [facility] [place], [place]              Office: [phone]     Communications: 60 minutes were spent on today's visit in face-to-face and non face-to-face time with the  patient. This patient was recently diagnosed with hypercalcemia of malignancy and the time was required to provide counseling and guidance regarding their new diagnosis. Time was spent reviewing all outside records and information pertaining to their work-up and formulating a treatment plan in line with standardized guidelines. Additional time was spent communicating with referring physicians and facilities to facilitate the efficient exchange of previous healthcare records and radiographic imaging pertinent to the diagnosis and disease management.       No orders of the defined types were placed in this encounter.

## 2024-07-16 NOTE — PROGRESS NOTES
Surgical Oncology Clinic Note      Referring Provider: Dr. Marco Alford   PCP: Briana Vu DO    Reason For Visit: lymphadenopathy    HISTORY OF PRESENT ILLNESS       Rosibel Herrera is a 54 y.o. female with history of discoid lupus who presents today for evaluation and management of cutaneous lesion/hypercalcemia of unknown etiology.    She was in her usual state of good health up until June of this year.  She had been kind of having general fatigue in not feeling quite right and presented to Dr. Vu to establish care with a PCP.  As part of the workup routine lab work was done which showed an elevation in her calcium up to 12.  There was concern for dehydration and obviously electrolyte abnormalities and as such she was referred to nephrology.  Additional labs were done in preparation for a nephrology appointment and her calcium was again 12.1, with a corrected calcium of 13.1 and she was fatigued therefore she was instructed to present to the emergency department by the nephrologist on-call.  She presented to the ED here at [facility] and was subsequently admitted to the hospital medicine service.  During that admission she was found to have abnormal kappa/lambda light chains and persistent hypercalcemia despite adequate fluid resuscitation.  She was seen by Dr. Alford with Medical Oncology and told she likely there has lymphoma or multiple myeloma.  She was given bisphosphonates with a good response and her calcium levels returned to normal.  She was subsequently discharged home and then had a PET-CT scan done as an outpatient.  That PET-CT scan on 07/08/2024 showed multiple sites of abnormal FDG uptake within the soft tissues of the neck, face, left lobe of the thyroid, mediastinal and bilateral hilar lymphadenopathy, as well as some FDG avid lymph nodes within the abdomen pelvis, and bilateral inguinal regions.  She also has a questionable area of FDG avidity in the distal femur as  well as within the soft tissues inferior and posterior to the patella on the left.  She also had extensive uptake within the musculature of the calf bilaterally and an enlarged spleen.  She was discussed at multidisciplinary tumor board and was referred to myself as well as Dr. Lu for biopsies to obtain a pathologic diagnosis.  She will be undergoing an EBUS tomorrow for biopsy.  She was referred to me for biopsy of the subcutaneous nodules on her skin.    She states she 1st noticed these nodules on her right cheek, left cheek and, under her chin about 6 months ago maybe less.  The 1 on her chin has gotten harder and she also states that the 1 on her cheek is swollen up and then at 1 point in time 1st and then kind of came back to a little nodule.  She was seen by an outside dermatologist who lanced it however it returned.  She was subsequently seen by Dr. Dina madison here with Dermatology who treated for her acne but did state that if they did not improve she would need additional workup due to the nature of the lesions.  She also notes little nodule in her left forearm that has not really changed in size.  As mentioned she does have a history of discoid lupus the skin on her face since 1986.  Was the area just to the right of her right nasal ala.  She said she has never had any issues since that point in time and was told by somebody previously that since she had no problems it probably would not become systemic.    She does express some concern over the financial implications that all of this has had on her.  She works in does asked to recovery and has had a lot of out of pocket cost associated with her recent hospitalization as well as the extensive workup.  She does have daughters who live locally and do normally come to her appointment with her however are not with her today.  She does note good family support    Past Medical History:   Diagnosis Date    Calculus of kidney 4/9/2014 2:20:53 PM    [facility]  [facility] - Urology: KidneyStones-No Additional Notes    Class 1 obesity due to excess calories with body mass index (BMI) of 30.0 to 30.9 in adult 06/23/2024    Complications affecting other specified body systems, hypertension 3/26/2014 1:38:44 PM    [facility] - Cardiovascular: Hypertension-No Additional Notes    Dyslipidemia 01/05/2023    Excessive or frequent menstruation 3/20/2014 9:22:15 AM    [facility]: Bleeding, Menorrhagia-No Additional Notes    Hypertension     Leiomyoma of uterus 3/20/2014 9:23:21 AM    [facility]: Fibroids/Leiomyoma of Uterus-2.1x1.8cm (POST/LT); 3.9x3.5cm (ANT TO ENDO)    Type 2 diabetes mellitus 10/18/2021       Past Surgical History:   Procedure Laterality Date    ENDOMETRIAL ABLATION      HYSTERECTOMY      fibroids    LITHOTRIPSY         Family History   Problem Relation Name Age of Onset    Diabetes Father         Social History     Socioeconomic History    Marital status:    Tobacco Use    Smoking status: Never    Smokeless tobacco: Never   Substance and Sexual Activity    Alcohol use: Yes     Comment: rarely    Drug use: No    Sexual activity: Yes     Partners: Male     Birth control/protection: Post-menopausal     Social Determinants of Health     Financial Resource Strain: Low Risk  (5/30/2024)    Overall Financial Resource Strain (CARDIA)     Difficulty of Paying Living Expenses: Not hard at all   Food Insecurity: No Food Insecurity (5/30/2024)    Hunger Vital Sign     Worried About Running Out of Food in the Last Year: Never true     Ran Out of Food in the Last Year: Never true   Physical Activity: Insufficiently Active (5/30/2024)    Exercise Vital Sign     Days of Exercise per Week: 2 days     Minutes of Exercise per Session: 30 min   Stress: No Stress Concern Present (5/30/2024)    [employer] - Occupational Stress Questionnaire     Feeling of Stress : Only a little    Housing Stability: Unknown (5/30/2024)    Housing Stability Vital Sign     Unable to Pay for Housing in the Last Year: No          Medication List            Accurate as of July 16, 2024 11:59 PM. If you have any questions, ask your nurse or doctor.                CONTINUE taking these medications      amLODIPine 10 MG tablet  Commonly known as: NORVASC  Take 1 tablet (10 mg total) by mouth once daily.     azelastine 137 mcg (0.1 %) nasal spray  Commonly known as: ASTELIN     fluticasone propionate 50 mcg/actuation nasal spray  Commonly known as: FLONASE     losartan 50 MG tablet  Commonly known as: COZAAR  Take 1 tablet (50 mg total) by mouth once daily.     metFORMIN 500 MG tablet  Commonly known as: GLUCOPHAGE     montelukast 10 mg tablet  Commonly known as: SINGULAIR              Review of patient's allergies indicates:   Allergen Reactions    Tylox [oxycodone-acetaminophen]        Review of Systems   Constitutional:  Negative for chills, fever, malaise/fatigue and weight loss.   HENT: Negative.     Respiratory:  Negative for cough and shortness of breath.    Cardiovascular:  Negative for chest pain, palpitations and orthopnea.   Gastrointestinal:  Negative for abdominal pain, blood in stool, constipation, diarrhea, nausea and vomiting.   Skin: Negative.  Negative for rash.         Vitals:    07/16/24 1528   BP: 131/86   Pulse: 92   Temp: 98 °F (36.7 °C)     Body mass index is 29.37 kg/m².  ECOG SCORE    0 - Fully active-able to carry on all pre-disease performance without restriction         PHYSICAL EXAM       Physical Exam  Vitals reviewed.   Constitutional:       General: She is not in acute distress.     Appearance: Normal appearance.   HENT:      Head: Normocephalic and atraumatic.        Comments: Firm lesions on the right cheek, underneath the chin, and smaller lesion on the left cheek     Mouth/Throat:      Mouth: Mucous membranes are moist.   Eyes:      General: No scleral icterus.     Extraocular  "Movements: Extraocular movements intact.      Conjunctiva/sclera: Conjunctivae normal.   Pulmonary:      Effort: Pulmonary effort is normal.   Abdominal:      General: There is no distension.      Palpations: Abdomen is soft. There is no mass.      Tenderness: There is no abdominal tenderness.   Musculoskeletal:         General: No swelling. Normal range of motion.   Skin:     General: Skin is warm and dry.   Neurological:      General: No focal deficit present.      Mental Status: She is alert.   Psychiatric:         Mood and Affect: Mood normal.         Behavior: Behavior normal.         Thought Content: Thought content normal.         Judgment: Judgment normal.          DATA REVIEW:  I personally reviewed the following records for this visit: lab work from prior visit, notes from other physicians, radiographic study evaluation, and laboratory results done by primary care physician    LABS       Lab Results   Component Value Date    WBC 5.43 07/16/2024    HGB 12.6 07/16/2024    HCT 38.6 07/16/2024     07/16/2024     Lab Results   Component Value Date     07/16/2024    CALCIUM 10.1 07/16/2024    ALBUMIN 3.0 (L) 07/16/2024    PROT 11.0 (H) 07/16/2024     (L) 07/16/2024    K 4.7 07/16/2024    CO2 21 (L) 07/16/2024     07/16/2024    BUN 19 07/16/2024    CREATININE 1.4 07/16/2024    ALKPHOS 117 07/16/2024    ALT 17 07/16/2024    AST 32 07/16/2024    BILITOT 0.5 07/16/2024       Nutritional:  No results found for: "PREALBUMIN"    Tumor Markers:  No results found for: "CEA", "AMYLASE", "ASPIRATE", "ZWT137", "", "DA4189", "AFP", "AFPTM"  No results found for: ""    PATHOLOGY     None    IMAGING     07/08/2024:  NM PET Scan  Impression:   1. Multiple sites of abnormal FDG uptake seen within the soft tissues of the neck, face, left lobe of the thyroid gland, mediastinal and bilateral hilar adenopathy along with some FDG avid lymph nodes seen within the abdomen and pelvis and bilateral " inguinal regions and single FDG avid lymph node seen adjacent to the distal femur along the course of the superficial femoral artery on the left.  There is also a single FDG avid lesion within the soft tissues inferior and posterior to the patella on the left.  Extensive uptake seen within the musculature of the calf regions bilaterally consistent with disease.  Enlarged spleen.      ASSESSMENT & PLAN     1. Hypercalcemia    2. Multiple myeloma not having achieved remission       Rosibel Herrera is a 54 y.o. female with hypercalcemia of malignancy of unknown origin.  Based on imaging and current work up differential includes multiple myeloma vs lymphoma vs sarcoidosis vs other.  She is having an endobronchial biopsy by Dr. Lu tomorrow and a bone marrow biopsy tomorrow.  I discussed with her that I could easily do a biopsy of her skin.  She is concerned about the additional costs of procedures, which is certainly reasonable.  We discussed that it is possible that she will get a pathologic diagnosis from these other biopsies, which would render a cutaneous biopsy irrelevant.  But if those are negative, waiting to biopsy will delay her diagnosis and treatment.  At this time she would like to wait for the cutaneous biopsy.      I am happy to see her back for a procedure visit only and biopsy under her chin if her other biopsies are non diagnostic.  Pt instructed to call if there are any issues.      Ms. Herrera expressed understanding in regards to our discussion today. Many good questions were asked on today's visit, all of which were answered to their satisfaction.    Follow-up: Follow up if other biopsies are non-diagnostic.                  Pamela Rojas MD MS              Surgical Oncology              [facility] [place], [place]              Office: [phone]     Communications: 60 minutes were spent on today's visit in face-to-face and non face-to-face time with the  patient. This patient was recently diagnosed with hypercalcemia of malignancy and the time was required to provide counseling and guidance regarding their new diagnosis. Time was spent reviewing all outside records and information pertaining to their work-up and formulating a treatment plan in line with standardized guidelines. Additional time was spent communicating with referring physicians and facilities to facilitate the efficient exchange of previous healthcare records and radiographic imaging pertinent to the diagnosis and disease management.       No orders of the defined types were placed in this encounter.

## 2024-07-17 ENCOUNTER — ANESTHESIA (OUTPATIENT)
Dept: ENDOSCOPY | Facility: HOSPITAL | Age: 54
End: 2024-07-17
Payer: COMMERCIAL

## 2024-07-17 ENCOUNTER — ANESTHESIA EVENT (OUTPATIENT)
Dept: ENDOSCOPY | Facility: HOSPITAL | Age: 54
End: 2024-07-17
Payer: COMMERCIAL

## 2024-07-17 ENCOUNTER — HOSPITAL ENCOUNTER (OUTPATIENT)
Facility: HOSPITAL | Age: 54
Discharge: HOME | End: 2024-07-17
Attending: INTERNAL MEDICINE | Admitting: INTERNAL MEDICINE
Payer: COMMERCIAL

## 2024-07-17 VITALS
WEIGHT: 165 LBS | BODY MASS INDEX: 29.23 KG/M2 | DIASTOLIC BLOOD PRESSURE: 65 MMHG | OXYGEN SATURATION: 96 % | HEIGHT: 63 IN | HEART RATE: 80 BPM | SYSTOLIC BLOOD PRESSURE: 130 MMHG | RESPIRATION RATE: 16 BRPM | TEMPERATURE: 98 F

## 2024-07-17 DIAGNOSIS — R94.8 ABNORMAL PET SCAN OF MEDIASTINUM: ICD-10-CM

## 2024-07-17 DIAGNOSIS — E83.52 HYPERCALCEMIA: Primary | ICD-10-CM

## 2024-07-17 PROBLEM — D86.3: Status: ACTIVE | Noted: 2024-07-17

## 2024-07-17 PROBLEM — L93.0 DISCOID LUPUS: Status: ACTIVE | Noted: 2024-07-17

## 2024-07-17 PROBLEM — R91.8 ENDOBRONCHIAL MASS: Status: ACTIVE | Noted: 2024-07-17

## 2024-07-17 PROCEDURE — 37000008 HC ANESTHESIA 1ST 15 MINUTES: Performed by: INTERNAL MEDICINE

## 2024-07-17 PROCEDURE — 83520 IMMUNOASSAY QUANT NOS NONAB: CPT | Performed by: INTERNAL MEDICINE

## 2024-07-17 PROCEDURE — 88189 FLOWCYTOMETRY/READ 16 & >: CPT | Mod: ,,, | Performed by: PATHOLOGY

## 2024-07-17 PROCEDURE — 88184 FLOWCYTOMETRY/ TC 1 MARKER: CPT | Performed by: PATHOLOGY

## 2024-07-17 PROCEDURE — 27200944 HC BRONCH FORCEPS DISPOSABLE: Performed by: INTERNAL MEDICINE

## 2024-07-17 PROCEDURE — 88312 SPECIAL STAINS GROUP 1: CPT | Mod: 59 | Performed by: PATHOLOGY

## 2024-07-17 PROCEDURE — 25000003 PHARM REV CODE 250: Performed by: INTERNAL MEDICINE

## 2024-07-17 PROCEDURE — 25000003 PHARM REV CODE 250: Performed by: NURSE ANESTHETIST, CERTIFIED REGISTERED

## 2024-07-17 PROCEDURE — 31653 BRONCH EBUS SAMPLNG 3/> NODE: CPT | Mod: ,,, | Performed by: INTERNAL MEDICINE

## 2024-07-17 PROCEDURE — 31625 BRONCHOSCOPY W/BIOPSY(S): CPT | Mod: 59,,, | Performed by: INTERNAL MEDICINE

## 2024-07-17 PROCEDURE — 31625 BRONCHOSCOPY W/BIOPSY(S): CPT | Mod: 59 | Performed by: INTERNAL MEDICINE

## 2024-07-17 PROCEDURE — 88173 CYTOPATH EVAL FNA REPORT: CPT | Performed by: PATHOLOGY

## 2024-07-17 PROCEDURE — 88305 TISSUE EXAM BY PATHOLOGIST: CPT | Performed by: PATHOLOGY

## 2024-07-17 PROCEDURE — 63600175 PHARM REV CODE 636 W HCPCS: Performed by: NURSE ANESTHETIST, CERTIFIED REGISTERED

## 2024-07-17 PROCEDURE — 27202059 HC NEEDLE, FNA (ANY): Performed by: INTERNAL MEDICINE

## 2024-07-17 PROCEDURE — 82164 ANGIOTENSIN I ENZYME TEST: CPT | Performed by: INTERNAL MEDICINE

## 2024-07-17 PROCEDURE — 88185 FLOWCYTOMETRY/TC ADD-ON: CPT | Performed by: PATHOLOGY

## 2024-07-17 PROCEDURE — 31653 BRONCH EBUS SAMPLNG 3/> NODE: CPT | Performed by: INTERNAL MEDICINE

## 2024-07-17 PROCEDURE — 37000009 HC ANESTHESIA EA ADD 15 MINS: Performed by: INTERNAL MEDICINE

## 2024-07-17 RX ORDER — LIDOCAINE HYDROCHLORIDE 20 MG/ML
INJECTION, SOLUTION EPIDURAL; INFILTRATION; INTRACAUDAL; PERINEURAL
Status: DISCONTINUED | OUTPATIENT
Start: 2024-07-17 | End: 2024-07-17

## 2024-07-17 RX ORDER — SODIUM CHLORIDE 9 MG/ML
INJECTION, SOLUTION INTRAVENOUS CONTINUOUS
Status: DISCONTINUED | OUTPATIENT
Start: 2024-07-17 | End: 2024-07-17 | Stop reason: HOSPADM

## 2024-07-17 RX ORDER — LIDOCAINE HYDROCHLORIDE 10 MG/ML
INJECTION INFILTRATION; PERINEURAL
Status: DISCONTINUED | OUTPATIENT
Start: 2024-07-17 | End: 2024-07-17 | Stop reason: HOSPADM

## 2024-07-17 RX ORDER — PROPOFOL 10 MG/ML
VIAL (ML) INTRAVENOUS
Status: DISCONTINUED | OUTPATIENT
Start: 2024-07-17 | End: 2024-07-17

## 2024-07-17 RX ORDER — LIDOCAINE HYDROCHLORIDE 10 MG/ML
INJECTION, SOLUTION EPIDURAL; INFILTRATION; INTRACAUDAL; PERINEURAL
Status: DISCONTINUED | OUTPATIENT
Start: 2024-07-17 | End: 2024-07-17

## 2024-07-17 RX ORDER — DEXAMETHASONE SODIUM PHOSPHATE 4 MG/ML
INJECTION, SOLUTION INTRA-ARTICULAR; INTRALESIONAL; INTRAMUSCULAR; INTRAVENOUS; SOFT TISSUE
Status: DISCONTINUED | OUTPATIENT
Start: 2024-07-17 | End: 2024-07-17

## 2024-07-17 RX ORDER — ROCURONIUM BROMIDE 10 MG/ML
INJECTION, SOLUTION INTRAVENOUS
Status: DISCONTINUED | OUTPATIENT
Start: 2024-07-17 | End: 2024-07-17

## 2024-07-17 RX ORDER — SUCCINYLCHOLINE CHLORIDE 20 MG/ML
INJECTION INTRAMUSCULAR; INTRAVENOUS
Status: DISCONTINUED | OUTPATIENT
Start: 2024-07-17 | End: 2024-07-17

## 2024-07-17 RX ORDER — LIDOCAINE HYDROCHLORIDE 40 MG/ML
4 SOLUTION TOPICAL ONCE
Status: DISCONTINUED | OUTPATIENT
Start: 2024-07-17 | End: 2024-07-17 | Stop reason: HOSPADM

## 2024-07-17 RX ORDER — ONDANSETRON HYDROCHLORIDE 2 MG/ML
INJECTION, SOLUTION INTRAVENOUS
Status: DISCONTINUED | OUTPATIENT
Start: 2024-07-17 | End: 2024-07-17

## 2024-07-17 RX ORDER — LIDOCAINE HYDROCHLORIDE 10 MG/ML
20 INJECTION, SOLUTION EPIDURAL; INFILTRATION; INTRACAUDAL; PERINEURAL ONCE
Status: DISCONTINUED | OUTPATIENT
Start: 2024-07-17 | End: 2024-07-17 | Stop reason: HOSPADM

## 2024-07-17 RX ORDER — FENTANYL CITRATE 50 UG/ML
INJECTION, SOLUTION INTRAMUSCULAR; INTRAVENOUS
Status: DISCONTINUED | OUTPATIENT
Start: 2024-07-17 | End: 2024-07-17

## 2024-07-17 RX ADMIN — PROPOFOL 20 MG: 10 INJECTION, EMULSION INTRAVENOUS at 09:07

## 2024-07-17 RX ADMIN — LIDOCAINE HYDROCHLORIDE 50 MG: 10 SOLUTION INTRAVENOUS at 08:07

## 2024-07-17 RX ADMIN — ONDANSETRON 4 MG: 2 INJECTION INTRAMUSCULAR; INTRAVENOUS at 08:07

## 2024-07-17 RX ADMIN — SODIUM CHLORIDE, POTASSIUM CHLORIDE, SODIUM LACTATE AND CALCIUM CHLORIDE: 600; 310; 30; 20 INJECTION, SOLUTION INTRAVENOUS at 08:07

## 2024-07-17 RX ADMIN — LIDOCAINE HYDROCHLORIDE 50 MG: 10 SOLUTION INTRAVENOUS at 09:07

## 2024-07-17 RX ADMIN — SUCCINYLCHOLINE CHLORIDE 100 MG: 20 INJECTION, SOLUTION INTRAMUSCULAR; INTRAVENOUS; PARENTERAL at 08:07

## 2024-07-17 RX ADMIN — PROPOFOL 200 MG: 10 INJECTION, EMULSION INTRAVENOUS at 08:07

## 2024-07-17 RX ADMIN — DEXAMETHASONE SODIUM PHOSPHATE 8 MG: 4 INJECTION, SOLUTION INTRA-ARTICULAR; INTRALESIONAL; INTRAMUSCULAR; INTRAVENOUS; SOFT TISSUE at 08:07

## 2024-07-17 RX ADMIN — FENTANYL CITRATE 100 MCG: 50 INJECTION, SOLUTION INTRAMUSCULAR; INTRAVENOUS at 08:07

## 2024-07-17 RX ADMIN — ROCURONIUM BROMIDE 45 MG: 10 INJECTION, SOLUTION INTRAVENOUS at 08:07

## 2024-07-17 RX ADMIN — SUGAMMADEX 200 MG: 100 INJECTION, SOLUTION INTRAVENOUS at 09:07

## 2024-07-17 RX ADMIN — ROCURONIUM BROMIDE 5 MG: 10 INJECTION, SOLUTION INTRAVENOUS at 08:07

## 2024-07-17 NOTE — DISCHARGE SUMMARY
[facility] (Hospital)  Discharge Note  Short Stay    Procedure(s) (LRB):  Bronchoscopy - insert lighted tube into airway to take a biopsy of lung (Bilateral)  ENDOBRONCHIAL ULTRASOUND (EBUS) (Bilateral)      OUTCOME: Patient tolerated treatment/procedure well without complication and is now ready for discharge.    DISPOSITION: Home or Self Care    FINAL DIAGNOSIS:  Abnormal PET scan of mediastinum    FOLLOWUP: In clinic    DISCHARGE INSTRUCTIONS:  No discharge procedures on file.     TIME SPENT ON DISCHARGE: 15 minutes

## 2024-07-17 NOTE — PLAN OF CARE
MD Aly at bedside speaking with pt/family about procedure at this time. Questions answered. No complaints noted per pt.

## 2024-07-17 NOTE — ANESTHESIA PREPROCEDURE EVALUATION
07/17/2024  Rosibel Herrera is a 54 y.o., female.  Patient Active Problem List   Diagnosis    Benign paroxysmal positional vertigo    Diabetic nephropathy with proteinuria    Dyslipidemia    Hypertensive disorder    Kidney stone    Type 2 diabetes mellitus    Vitamin D deficiency    Hypercalcemia    Hypertension    Class 1 obesity due to excess calories with body mass index (BMI) of 30.0 to 30.9 in adult    Abnormal PET scan of mediastinum      Past Surgical History:   Procedure Laterality Date    ENDOMETRIAL ABLATION      HYSTERECTOMY      fibroids    LITHOTRIPSY        Pre-op Assessment    I have reviewed the Patient Summary Reports.     I have reviewed the Nursing Notes. I have reviewed the NPO Status.   I have reviewed the Medications.     Review of Systems  Cardiovascular:     Hypertension                                  Hypertension         Renal/:  Chronic Renal Disease        Kidney Function/Disease             Endocrine:  Diabetes    Diabetes                          Physical Exam  General: Well nourished, Cooperative, Alert and Oriented    Airway:  Mallampati: II   Mouth Opening: Normal  TM Distance: Normal  Tongue: Normal  Neck ROM: Normal ROM    Dental:  Intact        Anesthesia Plan  Type of Anesthesia, risks & benefits discussed:    Anesthesia Type: MAC, Gen ETT  Intra-op Monitoring Plan: Standard ASA Monitors  Post Op Pain Control Plan: multimodal analgesia  Induction:  IV  Informed Consent: Informed consent signed with the Patient and all parties understand the risks and agree with anesthesia plan.  All questions answered.   ASA Score: 2  Day of Surgery Review of History & Physical: H&P Update referred to the surgeon/provider.    Ready For Surgery From Anesthesia Perspective.     .

## 2024-07-17 NOTE — ANESTHESIA POSTPROCEDURE EVALUATION
Anesthesia Post Evaluation    Patient: Rosibel Herrera    Procedure(s) Performed: Procedure(s) (LRB):  Bronchoscopy - insert lighted tube into airway to take a biopsy of lung (Bilateral)  ENDOBRONCHIAL ULTRASOUND (EBUS) (Bilateral)    Final Anesthesia Type: general      Patient location during evaluation: GI PACU  Patient participation: No - Unable to Participate, Sedation  Level of consciousness: sedated  Post-procedure vital signs: reviewed and stable  Pain management: adequate  Airway patency: patent    PONV status at discharge: No PONV  Anesthetic complications: no      Cardiovascular status: blood pressure returned to baseline  Respiratory status: spontaneous ventilation and room air  Hydration status: euvolemic  Follow-up not needed.              Vitals Value Taken Time   /65 07/17/24 1000   Temp 36.8 °C (98.2 °F) 07/17/24 0950   Pulse 80 07/17/24 1000   Resp 16 07/17/24 1000   SpO2 96 % 07/17/24 1000         Event Time   Out of Recovery 10:14:01         Pain/Faraz Score: Faraz Score: 9 (7/17/2024 10:00 AM)

## 2024-07-17 NOTE — ANESTHESIA PROCEDURE NOTES
Intubation    Date/Time: 7/17/2024 8:17 AM    Performed by: Jon Schultz CRNA  Authorized by: Librado Fontanez II, MD    Intubation:     Induction:  Intravenous    Mask Ventilation:  Easy mask    Attempts:  1    Attempted By:  CRNA    Method of Intubation:  Direct    Blade:  Danae 3    Laryngeal View Grade: Grade IIA - cords partially seen      Difficult Airway Encountered?: No      Complications:  None    Airway Device:  Oral endotracheal tube    Airway Device Size:  8.5    Style/Cuff Inflation:  Cuffed (inflated to minimal occlusive pressure)    Tube secured:  21    Secured at:  The lips    Placement Verified By:  Capnometry    Complicating Factors:  None    Findings Post-Intubation:  BS equal bilateral

## 2024-07-17 NOTE — OP NOTE
Consent completed   Patient brought into surgical area   Intubation and time-out and score   Numerous mucosa endobronchial nodules   Biopsies of the anai taking   Converted to EBUS   Elder gauge needle use   Station 7:  25.3 mm   5 passes   One pass sent for flow cytometry   Station 4 R 11 0.9 mm: 3 passes   Station 10 R: 5.7 mm  Three passes   Station 4 L:  5.8 mm   3 passes   Station 10l: 5.8 mm no specimens  Patient extubated addendum procedure

## 2024-07-17 NOTE — TRANSFER OF CARE
"Anesthesia Transfer of Care Note    Patient: Rosibel Herrera    Procedure(s) Performed: Procedure(s) (LRB):  Bronchoscopy - insert lighted tube into airway to take a biopsy of lung (Bilateral)  ENDOBRONCHIAL ULTRASOUND (EBUS) (Bilateral)    Patient location: PACU    Anesthesia Type: general    Transport from OR: Transported from OR on room air with adequate spontaneous ventilation    Post pain: adequate analgesia    Post assessment: no apparent anesthetic complications    Post vital signs: stable    Level of consciousness: sedated    Nausea/Vomiting: no nausea/vomiting    Complications: none    Transfer of care protocol was followed      Last vitals: Visit Vitals  /67 (BP Location: Left arm, Patient Position: Lying)   Pulse 85   Temp 36.7 °C (98.1 °F) (Temporal)   Resp (!) 24   Ht 5' 3" (1.6 m)   Wt 74.8 kg (165 lb)   SpO2 (!) 92%   Breastfeeding No   BMI 29.23 kg/m²     "

## 2024-07-18 DIAGNOSIS — E83.52 HYPERCALCEMIA: Primary | ICD-10-CM

## 2024-07-18 LAB
FLOW CYTOMETRY ANTIBODIES ANALYZED - TISSUE: NORMAL
FLOW CYTOMETRY COMMENT - TISSUE: NORMAL
FLOW CYTOMETRY INTERPRETATION - TISSUE: NORMAL
SPECIMEN TYPE - TISSUE: NORMAL

## 2024-07-19 LAB — ACE SERPL-CCNC: 228 U/L (ref 16–85)

## 2024-07-19 NOTE — PROGRESS NOTES
Pre-procedure phone call made at this time. Informed pt to be NPO after midnight, show up to [facility] [address] at 8:30, either have a ride with them or have a phone number for the person driving them home so that we can get in contact with them to keep them updated. Answered all questions that the pt had and pt verbalized understanding of all discussed.

## 2024-07-20 LAB — SOL IL2 RECEP SERPL-MCNC: 1819.2 PG/ML (ref 175.3–858.2)

## 2024-07-22 ENCOUNTER — HOSPITAL ENCOUNTER (OUTPATIENT)
Dept: RADIOLOGY | Facility: HOSPITAL | Age: 54
Discharge: HOME | End: 2024-07-22
Attending: INTERNAL MEDICINE
Payer: COMMERCIAL

## 2024-07-22 VITALS
BODY MASS INDEX: 29.23 KG/M2 | OXYGEN SATURATION: 94 % | HEIGHT: 63 IN | SYSTOLIC BLOOD PRESSURE: 135 MMHG | RESPIRATION RATE: 18 BRPM | DIASTOLIC BLOOD PRESSURE: 84 MMHG | WEIGHT: 165 LBS | HEART RATE: 78 BPM

## 2024-07-22 DIAGNOSIS — C90.00 MULTIPLE MYELOMA NOT HAVING ACHIEVED REMISSION: ICD-10-CM

## 2024-07-22 LAB
INR PPP: 1.1 (ref 0.8–1.2)
PROTHROMBIN TIME: 11.7 SEC (ref 9–12.5)

## 2024-07-22 PROCEDURE — 88184 FLOWCYTOMETRY/ TC 1 MARKER: CPT | Performed by: INTERNAL MEDICINE

## 2024-07-22 PROCEDURE — 38221 DX BONE MARROW BIOPSIES: CPT | Mod: RT,,, | Performed by: RADIOLOGY

## 2024-07-22 PROCEDURE — 63600175 PHARM REV CODE 636 W HCPCS: Performed by: RADIOLOGY

## 2024-07-22 PROCEDURE — 88185 FLOWCYTOMETRY/TC ADD-ON: CPT | Mod: 91 | Performed by: INTERNAL MEDICINE

## 2024-07-22 PROCEDURE — 25000003 PHARM REV CODE 250: Performed by: RADIOLOGY

## 2024-07-22 PROCEDURE — 77012 CT SCAN FOR NEEDLE BIOPSY: CPT | Mod: 26,,, | Performed by: RADIOLOGY

## 2024-07-22 PROCEDURE — 77012 CT SCAN FOR NEEDLE BIOPSY: CPT | Mod: TC

## 2024-07-22 PROCEDURE — 88237 TISSUE CULTURE BONE MARROW: CPT | Performed by: INTERNAL MEDICINE

## 2024-07-22 PROCEDURE — 85610 PROTHROMBIN TIME: CPT | Performed by: RADIOLOGY

## 2024-07-22 RX ORDER — LIDOCAINE HYDROCHLORIDE 10 MG/ML
INJECTION INFILTRATION; PERINEURAL CODE/TRAUMA/SEDATION MEDICATION
Status: COMPLETED | OUTPATIENT
Start: 2024-07-22 | End: 2024-07-22

## 2024-07-22 RX ORDER — FENTANYL CITRATE 50 UG/ML
INJECTION, SOLUTION INTRAMUSCULAR; INTRAVENOUS CODE/TRAUMA/SEDATION MEDICATION
Status: COMPLETED | OUTPATIENT
Start: 2024-07-22 | End: 2024-07-22

## 2024-07-22 RX ORDER — MIDAZOLAM HYDROCHLORIDE 1 MG/ML
INJECTION, SOLUTION INTRAMUSCULAR; INTRAVENOUS CODE/TRAUMA/SEDATION MEDICATION
Status: COMPLETED | OUTPATIENT
Start: 2024-07-22 | End: 2024-07-22

## 2024-07-22 RX ADMIN — FENTANYL CITRATE 50 MCG: 50 INJECTION, SOLUTION INTRAMUSCULAR; INTRAVENOUS at 09:07

## 2024-07-22 RX ADMIN — LIDOCAINE HYDROCHLORIDE 5 ML: 10 INJECTION, SOLUTION INFILTRATION; PERINEURAL at 09:07

## 2024-07-22 RX ADMIN — MIDAZOLAM 1 MG: 1 INJECTION INTRAMUSCULAR; INTRAVENOUS at 09:07

## 2024-07-22 NOTE — DISCHARGE INSTRUCTIONS
Please return to ER if any of these symptoms occur:  Fever over 101 degrees,  Any purulent drainage from site (pus, yellow or has foul odor), or any redness or swelling to site  Bleeding from the puncture site not controlled, If bleeding occurs at site hold pressure for 5 mins.  If bleeding continues go to ER  Pain not controlled with Aleve or Tylenol,     No driving for 24 hours after procedure due to sedation given during procedure.      Do not submerge in standing water for 2 days after biopsy but you may shower.     May change bandage if it becomes soiled and bandage may be removed in 2 days.     Rest for the next couple of days. Do not lift any thing heavier than a gallon of milk.  Increase activity as tolerated.     Resume home medications and diet     Biopsy results will be with Dr. Alford in 5-7 days, please follow up with him for results and any other questions or concerns that you may have.

## 2024-07-22 NOTE — DISCHARGE SUMMARY
[facility] (Hospital)  Discharge Note  Short Stay    CT Biopsy Bone Marrow (xpd)      OUTCOME: Patient tolerated treatment/procedure well without complication and is now ready for discharge.    DISPOSITION: Home or Self Care    FINAL DIAGNOSIS:  <principal problem not specified>    FOLLOWUP: In clinic    DISCHARGE INSTRUCTIONS:  No discharge procedures on file.      Clinical Reference Documents Added to Patient Instructions         Document    BONE MARROW ASPIRATION OR BIOPSY (ENGLISH)    PROCEDURAL SEDATION, ADULT ED (ENGLISH)            TIME SPENT ON DISCHARGE: 15 minutes    Pre Op Diagnosis: MM without remission     Post Op Diagnosis: same     Procedure:  Bone marrow biopsy     Procedure performed by: Philly BARLOW, Kannan SOL     Written Informed Consent Obtained: Yes     Specimen Removed:  yes     Estimated Blood Loss:  minimal     Findings: Local anesthesia and moderate sedation were used.     The patient tolerated the procedure well and there were no complications.      Disposition:  F/U in clinic    Discharge instructions:  Light activity for 24 hours.  Remove band aid in 24 hours.  No baths (showers are appropriate).    F/U with ordering physician    Sterile technique was performed in the right iliac, lidocaine was used as a local anesthetic.  Multiple samples taken percutaneously from the right iliac bone.  Pt tolerated the procedure well without immediate complications.  Please see radiologist report for details. F/u with PCP and/or ordering physician.     multip

## 2024-07-22 NOTE — PLAN OF CARE
Received patient from procedure via stretcher. VVS. Breathing even and unlabored. No complaints of pain, nausea, or headache. Procedure to bone marrow, bandage CDI. Family updated with patients status. Patient resting on stretcher.

## 2024-07-23 ENCOUNTER — TELEPHONE (OUTPATIENT)
Dept: SLEEP MEDICINE | Facility: CLINIC | Age: 54
End: 2024-07-23
Payer: COMMERCIAL

## 2024-07-23 ENCOUNTER — TELEPHONE (OUTPATIENT)
Dept: PULMONOLOGY | Facility: CLINIC | Age: 54
End: 2024-07-23
Payer: COMMERCIAL

## 2024-07-23 ENCOUNTER — PATIENT MESSAGE (OUTPATIENT)
Dept: HEMATOLOGY/ONCOLOGY | Facility: CLINIC | Age: 54
End: 2024-07-23
Payer: COMMERCIAL

## 2024-07-23 DIAGNOSIS — C85.90: Primary | ICD-10-CM

## 2024-07-23 LAB
BODY SITE - BONE MARROW: NORMAL
CLINICAL DIAGNOSIS - BONE MARROW: NORMAL
COMMENT: ABNORMAL
FINAL PATHOLOGIC DIAGNOSIS: ABNORMAL
Lab: ABNORMAL
REASON FOR REFERRAL (NARRATIVE): NORMAL

## 2024-07-23 RX ORDER — SULFAMETHOXAZOLE AND TRIMETHOPRIM 800; 160 MG/1; MG/1
1 TABLET ORAL
Qty: 12 TABLET | Refills: 2 | Status: SHIPPED | OUTPATIENT
Start: 2024-07-24 | End: 2024-10-22

## 2024-07-23 RX ORDER — PREDNISONE 20 MG/1
20 TABLET ORAL DAILY
Qty: 30 TABLET | Refills: 2 | Status: SHIPPED | OUTPATIENT
Start: 2024-07-23 | End: 2024-10-21

## 2024-07-23 NOTE — TELEPHONE ENCOUNTER
Spoke with patient   Biopsy results are consistent with sarcoidosis   We will start on prednisone 20 mg.  PCP prophylaxis Bactrim   We will need baseline PFT 6 minute walk   Close follow-up  Risks benefits prednisone discussed      Orders Placed This Encounter   Procedures    Complete PFT with bronchodilator     Standing Status:   Future     Standing Expiration Date:   9/2/2025     Order Specific Question:   Release to patient     Answer:   Immediate    Stress test, pulmonary     Standing Status:   Future     Standing Expiration Date:   7/23/2025     Order Specific Question:   Reason for study     Answer:   Functional status     Order Specific Question:   Release to patient     Answer:   Immediate     1. Lung, endobronchial lesion, biopsy:  - Benign bronchial tissue with non-necrotizing granulomatous inflammation  - Special stains for fungal (GMS) and acid fast (AFB) organisms are NEGATIVE with appropriate controls  - See comment     2. Lymph node, station 7, fine needle aspiration (smear slides and cell block):  - Benign lymphoid tissue with poorly formed non-necrotizing granulomatous inflammation  - See comment    3. Lymph node, 4R, fine needle aspiration (smear slides and cell block):  - Benign bronchial tissue  - Scant lymphoid component identified    4. Lymph node, 10R, fine needle aspiration (smear slides and cell block):  - Fragments of cartilage, rare benign bronchial cells, and scant lymphoid component identified  - No evidence of malignancy    5. Lymph node, 4L, fine needle aspiration (smear slides and cell block):  - Benign bronchial cells and scant lymphoid component identified  - No evidence of malignancy     Comment: Interp By RUSH Can MD, Signed on 07/23/2024 at 12:39   Comment  Abnormal   There is non-necrotizing granulomatous inflammation seen in the endobronchial lesion biopsy (specimen 1).  Special stains on this biopsy for fungal and acid-fast organisms are negative with appropriate  controls.  There are rare poorly formed  non-necrotizing granulomas seen in the fine-needle aspiration of the station 7 lymph node (specimen 2).  While these findings are nonspecific, they can be seen with infection, reactive conditions, or connective tissue disorders, such as sarcoidosis,  among other causes.  There is no evidence of malignancy.  Clinical and radiographic correlation recommended.

## 2024-07-23 NOTE — TELEPHONE ENCOUNTER
Called pt to schedule PFT and walk before 08/01/24 appt per provider, appt scheduled 7/26/24 at [facility], pt verbalized understanding.

## 2024-07-23 NOTE — TELEPHONE ENCOUNTER
----- Message from Marco Alford MD sent at 7/23/2024  1:55 PM CDT -----  Great news no evidence of cancer; let us present the imaging that was last week in if we can show path that would be very good  ----- Message -----  From: Liang Lu MD  Sent: 7/23/2024   1:12 PM CDT  To: MD FRANCO Henry

## 2024-07-25 ENCOUNTER — CLINICAL SUPPORT (OUTPATIENT)
Dept: PULMONOLOGY | Facility: CLINIC | Age: 54
End: 2024-07-25
Payer: COMMERCIAL

## 2024-07-25 ENCOUNTER — LAB VISIT (OUTPATIENT)
Dept: LAB | Facility: HOSPITAL | Age: 54
End: 2024-07-25
Attending: INTERNAL MEDICINE
Payer: COMMERCIAL

## 2024-07-25 VITALS — WEIGHT: 164.88 LBS | BODY MASS INDEX: 29.21 KG/M2 | HEIGHT: 63 IN

## 2024-07-25 DIAGNOSIS — C85.90: ICD-10-CM

## 2024-07-25 DIAGNOSIS — R94.8 ABNORMAL PET SCAN OF MEDIASTINUM: ICD-10-CM

## 2024-07-25 LAB
BRPFT: ABNORMAL
DLCO SINGLE BREATH LLN: 16.98
DLCO SINGLE BREATH PRE REF: 65.6 %
DLCO SINGLE BREATH REF: 22.71
DLCOC SBVA LLN: 3.2
DLCOC SBVA REF: 4.76
DLCOC SINGLE BREATH LLN: 16.98
DLCOC SINGLE BREATH REF: 22.71
DLCOVA LLN: 3.2
DLCOVA PRE REF: 95.4 %
DLCOVA PRE: 4.54 ML/(MIN*MMHG*L) (ref 3.2–6.32)
DLCOVA REF: 4.76
ERV LLN: -16449.15
ERV PRE REF: 83.1 %
ERV REF: 0.85
FEF 25 75 CHG: -5.4 %
FEF 25 75 LLN: 1.62
FEF 25 75 POST REF: 109.9 %
FEF 25 75 PRE REF: 116.3 %
FEF 25 75 REF: 3.02
FET100 CHG: 45.8 %
FEV1 CHG: -0.7 %
FEV1 FVC CHG: -0.4 %
FEV1 FVC LLN: 69
FEV1 FVC POST REF: 107.8 %
FEV1 FVC PRE REF: 108.2 %
FEV1 FVC REF: 80
FEV1 LLN: 1.6
FEV1 POST REF: 104.1 %
FEV1 PRE REF: 104.8 %
FEV1 REF: 2.16
FRCPLETH LLN: 1.82
FRCPLETH PREREF: 69.8 %
FRCPLETH REF: 2.64
FVC CHG: -0.3 %
FVC LLN: 2.02
FVC POST REF: 96.1 %
FVC PRE REF: 96.5 %
FVC REF: 2.7
IVC PRE: 2.29 L (ref 2.02–3.42)
IVC SINGLE BREATH LLN: 2.02
IVC SINGLE BREATH PRE REF: 84.9 %
IVC SINGLE BREATH REF: 2.7
MVV LLN: 79
MVV PRE REF: 117.1 %
MVV REF: 93
PCPDS FINAL DIAGNOSIS: NORMAL
PCPDS PRE-ANALYSIS PRE-SORT: NORMAL
PEF CHG: 9.8 %
PEF LLN: 3.75
PEF POST REF: 125.3 %
PEF PRE REF: 114.2 %
PEF REF: 5.7
POST FEF 25 75: 3.32 L/S (ref 1.62–4.41)
POST FET 100: 6.94 SEC
POST FEV1 FVC: 86.6 % (ref 68.98–90)
POST FEV1: 2.25 L (ref 1.6–2.71)
POST FVC: 2.6 L (ref 2.02–3.42)
POST PEF: 7.14 L/S (ref 3.75–7.64)
PRE DLCO: 14.9 ML/(MIN*MMHG) (ref 16.98–28.44)
PRE ERV: 0.7 L (ref -16449.15–16450.85)
PRE FEF 25 75: 3.51 L/S (ref 1.62–4.41)
PRE FET 100: 4.76 SEC
PRE FEV1 FVC: 86.9 % (ref 68.98–90)
PRE FEV1: 2.27 L (ref 1.6–2.71)
PRE FRC PL: 1.84 L (ref 1.82–3.46)
PRE FVC: 2.61 L (ref 2.02–3.42)
PRE MVV: 108.71 L/MIN (ref 78.91–106.76)
PRE PEF: 6.5 L/S (ref 3.75–7.64)
PRE RV: 1.14 L (ref 1.22–2.37)
PRE TLC: 3.75 L (ref 3.78–5.76)
RAW LLN: 3.06
RAW PRE REF: 56.4 %
RAW PRE: 1.72 CMH2O*S/L (ref 3.06–3.06)
RAW REF: 3.06
RV LLN: 1.22
RV PRE REF: 63.4 %
RV REF: 1.79
RVTLC LLN: 28
RVTLC PRE REF: 79.9 %
RVTLC PRE: 30.35 % (ref 28.41–47.59)
RVTLC REF: 38
TLC LLN: 3.78
TLC PRE REF: 78.5 %
TLC REF: 4.77
VA PRE: 3.28 L (ref 4.62–4.62)
VA SINGLE BREATH LLN: 4.62
VA SINGLE BREATH PRE REF: 71 %
VA SINGLE BREATH REF: 4.62
VC LLN: 2.02
VC PRE REF: 96.5 %
VC PRE: 2.61 L (ref 2.02–3.42)
VC REF: 2.7

## 2024-07-25 PROCEDURE — 83520 IMMUNOASSAY QUANT NOS NONAB: CPT | Performed by: INTERNAL MEDICINE

## 2024-07-25 PROCEDURE — 82164 ANGIOTENSIN I ENZYME TEST: CPT | Performed by: INTERNAL MEDICINE

## 2024-07-25 PROCEDURE — 99999 PR PBB SHADOW E&M-EST. PATIENT-LVL II: CPT | Mod: PBBFAC,,,

## 2024-07-25 PROCEDURE — 99999 PR PBB SHADOW E&M-EST. PATIENT-LVL I: CPT | Mod: PBBFAC,,,

## 2024-07-25 NOTE — PROCEDURES
"[facility] - Pulmonary Function  Six Minute Walk     SUMMARY     Ordering Provider: Aly BARLOW   Interpreting Provider: Aly BARLOW  Performing nurse/tech/RT: LS RRT  Diagnosis:  (Sarcoma)  Height: 5' 3" (160 cm)  Weight: 74.8 kg (164 lb 14.5 oz)  BMI (Calculated): 29.2   Patient Race:             Phase Oxygen Assessment Supplemental O2 Heart   Rate Blood Pressure Zoie Dyspnea Scale Rating   Resting 98 % Room Air 88 bpm 132/73 1   Exercise        Minute        1 97 % Room Air 108 bpm     2 97 % Room Air 117 bpm     3 98 % Room Air 118 bpm     4 99 % Room Air 128 bpm     5 98 % Room Air 118 bpm     6  97 % Room Air 94 bpm 122/68 1   Recovery        Minute        1 97 % Room Air 94 bpm     2 98 % Room Air 94 bpm     3 98 % Room Air 97 bpm     4 99 % Room Air 99 bpm 118/69 1     Six Minute Walk Summary  6MWT Status: completed without stopping  Patient Reported: No complaints     Interpretation:  Did the patient stop or pause?: No           Total Time Walked (Calculated): 360 seconds  Final Partial Lap Distance (feet): 150 feet  Total Distance Meters (Calculated): 350.52 meters  Predicted Distance Meters (Calculated): 509.63 meters  Percentage of Predicted (Calculated): 68.78  Peak VO2 (Calculated): 14.5  Mets: 4.14  Has The Patient Had a Previous Six Minute Walk Test?: No       Previous 6MWT Results  Has The Patient Had a Previous Six Minute Walk Test?: No         CLINICAL INTERPRETATION:  Six minute walk distance is 350.52m (68.78 % predicted) with very, very light dyspnea.  During exercise, there was no significant desaturation while breathing room air.  Blood pressure remained stable and Heart rate increased significantly with walking.     The patient did not report non-pulmonary symptoms during exercise.  The patient did complete the study, walking 360 seconds of the 360 second test.  Based upon age and body mass index, exercise capacity is less than predicted.      [] Mild exercise-induced hypoxemia " described as an arterial oxygen saturation of 93-95% (or 3-4% less than at rest)  []  Moderate exercise-induced hypoxemia as 89-93%  []  Severe exercise induced hypoxemia as < 89% O2 saturation.  Medicare Criteria for oxygen prescription comments:   []  When arterial oxygen saturation is at or below 88% during exercise (severe exercise induced hypoxemia) then the patient falls under Medicare Group 1 criteria for supplemental oxygen

## 2024-07-26 ENCOUNTER — TUMOR BOARD CONFERENCE (OUTPATIENT)
Dept: HEMATOLOGY/ONCOLOGY | Facility: CLINIC | Age: 54
End: 2024-07-26
Payer: COMMERCIAL

## 2024-07-26 ENCOUNTER — PATIENT MESSAGE (OUTPATIENT)
Dept: HEMATOLOGY/ONCOLOGY | Facility: CLINIC | Age: 54
End: 2024-07-26
Payer: COMMERCIAL

## 2024-07-26 DIAGNOSIS — E04.1 THYROID NODULE: Primary | ICD-10-CM

## 2024-07-26 NOTE — PROGRESS NOTES
Tumor Board Documentation      Rosibel Herrera was presented by Marco Alford MD at our Tumor Board on 7/26/2024, which included representatives from Hematology, Medical Oncology, Gastrointestinal, Radiation Oncology, Surgical Oncology, Pulmonology, Pathology, Navigation, Palliative Care.    Rosibel currently presents as   with  , with history of the following treatments: None.    Additionally, we reviewed previous medical and familial history, history of present illness, and recent lab results along with all available histopathologic and imaging studies. The tumor board considered available treatment options and made the following recommendations:      Biopsy results are consistent with sarcoidosis will refer to pulmonary for further treatment     The following procedures/referrals were also placed: No orders of the defined types were placed in this encounter.      Clinical Trial Status: Not discussed     National site-specific guidelines were discussed with respect to the case.    Tumor board is a meeting of clinicians from various specialty areas who evaluate and discuss patients for whom a multidisciplinary approach is being considered. Final determinations in the plan of care are those of the provider(s). The responsibility for follow up of recommendations given during tumor board is that of the provider.     Marco Alford MD

## 2024-07-27 LAB — ACE SERPL-CCNC: 242 U/L (ref 16–85)

## 2024-07-28 LAB — SOL IL2 RECEP SERPL-MCNC: 1722.1 PG/ML (ref 175.3–858.2)

## 2024-07-31 ENCOUNTER — PATIENT MESSAGE (OUTPATIENT)
Dept: FAMILY MEDICINE | Facility: CLINIC | Age: 54
End: 2024-07-31
Payer: COMMERCIAL

## 2024-07-31 RX ORDER — AMLODIPINE BESYLATE 10 MG/1
10 TABLET ORAL DAILY
Qty: 90 TABLET | Refills: 3 | Status: SHIPPED | OUTPATIENT
Start: 2024-07-31 | End: 2025-07-26

## 2024-07-31 NOTE — TELEPHONE ENCOUNTER
No care due was identified.  [facility] Embedded Care Due Messages. Reference number: [id number].   7/31/2024 10:27:53 AM CDT

## 2024-08-01 ENCOUNTER — HOSPITAL ENCOUNTER (OUTPATIENT)
Dept: RADIOLOGY | Facility: HOSPITAL | Age: 54
Discharge: HOME | End: 2024-08-01
Attending: INTERNAL MEDICINE
Payer: COMMERCIAL

## 2024-08-01 ENCOUNTER — OFFICE VISIT (OUTPATIENT)
Dept: SLEEP MEDICINE | Facility: CLINIC | Age: 54
End: 2024-08-01
Payer: COMMERCIAL

## 2024-08-01 VITALS
HEIGHT: 63 IN | HEART RATE: 71 BPM | RESPIRATION RATE: 20 BRPM | WEIGHT: 168.63 LBS | BODY MASS INDEX: 29.88 KG/M2 | OXYGEN SATURATION: 99 % | DIASTOLIC BLOOD PRESSURE: 90 MMHG | SYSTOLIC BLOOD PRESSURE: 144 MMHG

## 2024-08-01 DIAGNOSIS — E04.1 THYROID NODULE: ICD-10-CM

## 2024-08-01 DIAGNOSIS — E66.09 CLASS 1 OBESITY DUE TO EXCESS CALORIES WITHOUT SERIOUS COMORBIDITY WITH BODY MASS INDEX (BMI) OF 30.0 TO 30.9 IN ADULT: ICD-10-CM

## 2024-08-01 DIAGNOSIS — D86.2 SARCOIDOSIS OF LUNG WITH SARCOIDOSIS OF LYMPH NODES: Primary | ICD-10-CM

## 2024-08-01 DIAGNOSIS — E78.5 DYSLIPIDEMIA: ICD-10-CM

## 2024-08-01 DIAGNOSIS — R94.8 ABNORMAL PET SCAN OF MEDIASTINUM: ICD-10-CM

## 2024-08-01 DIAGNOSIS — E83.52 HYPERCALCEMIA: ICD-10-CM

## 2024-08-01 PROCEDURE — 76536 US EXAM OF HEAD AND NECK: CPT | Mod: 26,,, | Performed by: RADIOLOGY

## 2024-08-01 PROCEDURE — 99999 PR PBB SHADOW E&M-EST. PATIENT-LVL V: CPT | Mod: PBBFAC,,, | Performed by: INTERNAL MEDICINE

## 2024-08-01 PROCEDURE — 76536 US EXAM OF HEAD AND NECK: CPT | Mod: TC

## 2024-08-01 NOTE — ASSESSMENT & PLAN NOTE
Patient to enroll in sarcoidosis support Foundation   Literature given to patient   Started on prednisone 20 mg for the next 60 days   Follow up with Ophthalmology and Cardiology   Eye exam   Check vitamin-D   Calcium is normalized

## 2024-08-01 NOTE — PROGRESS NOTES
:                                               Pulmonary Outpatient  Visit     Subjective:       Patient ID: Rosibel Herrera is a 54 y.o. female.    Social History     Tobacco Use   Smoking Status Never   Smokeless Tobacco Never            Chief Complaint: Results          Rosibel Herrera is 54 y.o.  Asked to see by Marco Alford MD  [id number] [place] [address]   Recently in hospital [facility]  Hypercalcemia  Now resolved  Discharge summary reveiwed  Abn PET CT: FDG avid areas 4R,7, 10 R, 10 L  Requested for EBUS sampling    Work at [employer]    Chest: No FDG avid pulmonary nodules/masses. Numerous FDG avid superior mediastinal lymph nodes including the right paratracheal region demonstrating SUV max of up to 4.8.  FDG avid prevascular lymph nodes demonstrate an SUV max of up to 54.8.  Subcarinal lymphadenopathy demonstrate an SUV max of 4.1.  FDG avid lymph nodes in the right hilar region/suprahilar region demonstrating an SUV max of up to 8.2 and FDG avid left hilar lymph nodes demonstrate a SUV max of up to 4.8.     08/01/2024   Follow-up visit   Biopsy results reviewed  Consistent with noncaseating granuloma support sarcoidosis   IL 2R results trending down   Patient also attests that the skin lesions have improved   No cough no wheezing no shortness a breath   PFTs reviewed consistent with mild reduced diffusion capacity                    Review of Systems   All other systems reviewed and are negative.      Outpatient Encounter Medications as of 8/1/2024   Medication Sig Dispense Refill    amLODIPine (NORVASC) 10 MG tablet Take 1 tablet (10 mg total) by mouth once daily. 90 tablet 3    azelastine (ASTELIN) 137 mcg (0.1 %) nasal spray 2 sprays by Nasal route 2 (two) times daily.      fluticasone propionate (FLONASE) 50 mcg/actuation nasal spray by Each Nostril route daily as needed.      losartan (COZAAR) 50 MG tablet Take 1 tablet (50 mg total) by mouth once daily. 90 tablet 3     metFORMIN (GLUCOPHAGE) 500 MG tablet Take 500 mg by mouth 2 (two) times daily with meals.      montelukast (SINGULAIR) 10 mg tablet Take 10 mg by mouth once daily.      predniSONE (DELTASONE) 20 MG tablet Take 1 tablet (20 mg total) by mouth once daily. 30 tablet 2    sulfamethoxazole-trimethoprim 800-160mg (BACTRIM DS) 800-160 mg Tab Take 1 tablet by mouth every Mon, Wed, Fri. 12 tablet 2    [DISCONTINUED] amLODIPine (NORVASC) 10 MG tablet Take 1 tablet (10 mg total) by mouth once daily. 30 tablet 0     No facility-administered encounter medications on file as of 8/1/2024.       The following portions of the patient's history were reviewed and updated as appropriate: She  has a past medical history of Calculus of kidney (4/9/2014 2:20:53 PM), Class 1 obesity due to excess calories with body mass index (BMI) of 30.0 to 30.9 in adult (06/23/2024), Complications affecting other specified body systems, hypertension (3/26/2014 1:38:44 PM), Dyslipidemia (01/05/2023), Excessive or frequent menstruation (3/20/2014 9:22:15 AM), Hypertension, Leiomyoma of uterus (3/20/2014 9:23:21 AM), and Type 2 diabetes mellitus (10/18/2021).  She does not have any pertinent problems on file.  She  has a past surgical history that includes Lithotripsy; Endometrial ablation; Hysterectomy; Bronchoscopy (Bilateral, 7/17/2024); and Endobronchial ultrasound (Bilateral, 7/17/2024).  Her family history includes Diabetes in her father.  She  reports that she has never smoked. She has never used smokeless tobacco. She reports current alcohol use. She reports that she does not use drugs.  She has a current medication list which includes the following prescription(s): amlodipine, azelastine, fluticasone propionate, losartan, metformin, montelukast, prednisone, and sulfamethoxazole-trimethoprim 800-160mg.  Current Outpatient Medications on File Prior to Visit   Medication Sig Dispense Refill    amLODIPine (NORVASC) 10 MG tablet Take 1 tablet (10 mg  "total) by mouth once daily. 90 tablet 3    azelastine (ASTELIN) 137 mcg (0.1 %) nasal spray 2 sprays by Nasal route 2 (two) times daily.      fluticasone propionate (FLONASE) 50 mcg/actuation nasal spray by Each Nostril route daily as needed.      losartan (COZAAR) 50 MG tablet Take 1 tablet (50 mg total) by mouth once daily. 90 tablet 3    metFORMIN (GLUCOPHAGE) 500 MG tablet Take 500 mg by mouth 2 (two) times daily with meals.      montelukast (SINGULAIR) 10 mg tablet Take 10 mg by mouth once daily.      predniSONE (DELTASONE) 20 MG tablet Take 1 tablet (20 mg total) by mouth once daily. 30 tablet 2    sulfamethoxazole-trimethoprim 800-160mg (BACTRIM DS) 800-160 mg Tab Take 1 tablet by mouth every Mon, Wed, Fri. 12 tablet 2     No current facility-administered medications on file prior to visit.     She is allergic to tylox [oxycodone-acetaminophen]..      BP Readings from Last 3 Encounters:   08/01/24 (!) 144/90   07/22/24 135/84   07/17/24 130/65          MMRC Dyspnea Scale (4 is worst)     [] MMRC 0: Dyspneic on strenuous excercise (0 points)    [] MMRC 1: Dyspneic on walking a slight hill (0 points)    [] MMRC 2: Dyspneic on walking level ground; must stop occasionally due to breathlessness (1 point)    [] MMRC 3: Must stop for breathlessness after walking 100 yards or after a few minutes (2 points)    [] MMRC 4: Cannot leave house; breathless on dressing/undressing (3 points)                          No data to display                          Objective:     Vital Signs (Most Recent)  Vital Signs  Pulse: 71  Resp: 20  SpO2: 99 %  BP: (!) 144/90  Height and Weight  Height: 5' 3" (160 cm)  Weight: 76.5 kg (168 lb 10.4 oz)  BSA (Calculated - sq m): 1.84 sq meters  BMI (Calculated): 29.9  Weight in (lb) to have BMI = 25: 140.8]  Wt Readings from Last 2 Encounters:   08/01/24 76.5 kg (168 lb 10.4 oz)   07/25/24 74.8 kg (164 lb 14.5 oz)       Physical Exam  Vitals and nursing note reviewed.   Constitutional:       " "Appearance: She is normal weight.   HENT:      Head: Normocephalic and atraumatic.      Nose: Nose normal.   Eyes:      Pupils: Pupils are equal, round, and reactive to light.   Cardiovascular:      Rate and Rhythm: Normal rate and regular rhythm.      Pulses: Normal pulses.      Heart sounds: Normal heart sounds.   Pulmonary:      Effort: Pulmonary effort is normal.      Breath sounds: Normal breath sounds.   Abdominal:      General: Bowel sounds are normal.      Palpations: Abdomen is soft.   Musculoskeletal:      Cervical back: Normal range of motion.   Skin:     General: Skin is warm.   Neurological:      General: No focal deficit present.      Mental Status: She is alert and oriented to person, place, and time.   Psychiatric:         Mood and Affect: Mood normal.          Laboratory  Lab Results   Component Value Date    WBC 5.43 07/16/2024    RBC 4.37 07/16/2024    HGB 12.6 07/16/2024    HCT 38.6 07/16/2024    MCV 88 07/16/2024    MCH 28.8 07/16/2024    MCHC 32.6 07/16/2024    RDW 14.3 07/16/2024     07/16/2024    MPV 10.1 07/16/2024    GRAN 2.8 07/16/2024    GRAN 51.4 07/16/2024    LYMPH 1.3 07/16/2024    LYMPH 24.3 07/16/2024    MONO 0.8 07/16/2024    MONO 15.3 (H) 07/16/2024    EOS 0.4 07/16/2024    BASO 0.04 07/16/2024    EOSINOPHIL 7.9 07/16/2024    BASOPHIL 0.7 07/16/2024       BMP  Lab Results   Component Value Date     (L) 07/16/2024    K 4.7 07/16/2024     07/16/2024    CO2 21 (L) 07/16/2024    BUN 19 07/16/2024    CREATININE 1.4 07/16/2024    CALCIUM 10.1 07/16/2024    ANIONGAP 10 07/16/2024    ESTGFRAFRICA 60 07/16/2021    EGFRNONAA 52 (A) 07/16/2021    AST 32 07/16/2024    ALT 17 07/16/2024    PROT 11.0 (H) 07/16/2024          No results found for: "IGE"     No results found for: "ASPERGILLUS"  No results found for: "AFUMIGATUSCL"     Lab Results   Component Value Date     (H) 07/25/2024        Diagnostic Results:  I have personally reviewed today the following " studies:    US Thyroid  Narrative: EXAMINATION:  US THYROID    CLINICAL HISTORY:  Nontoxic single thyroid nodule    COMPARISON:  No comparison studies are available.    FINDINGS:  The right thyroid lobe measures 5.2 x 1.7 x 1.6 cm, and the left thyroid lobe measures 5.0 x 2.2 x 1.4 cm. The isthmus measures 3 mm in thickness.    There are too numerous to count simple and minimally complicated thyroid cysts measuring up to 3 mm.  There is a 6 mm spongiform nodule in the superior left thyroid gland.  There is a spongiform nodule in the mid left thyroid gland measuring 1.6 cm in length.    The adjacent soft tissues are normal.  Impression: 1.  There are 2 spongiform nodules within the left thyroid gland measuring up to 1.6 cm.  Follow-up recommended to confirm stability of this probably benign finding.    2.  Multiple bilateral simple and minimally complicated thyroid cysts measuring up to 3 mm.    Electronically signed by: Eder Phillips MD  Date:    08/01/2024  Time:    14:14     1. Lung, endobronchial lesion, biopsy:  - Benign bronchial tissue with non-necrotizing granulomatous inflammation  - Special stains for fungal (GMS) and acid fast (AFB) organisms are NEGATIVE with appropriate controls  - See comment     2. Lymph node, station 7, fine needle aspiration (smear slides and cell block):  - Benign lymphoid tissue with poorly formed non-necrotizing granulomatous inflammation  - See comment    3. Lymph node, 4R, fine needle aspiration (smear slides and cell block):  - Benign bronchial tissue  - Scant lymphoid component identified    4. Lymph node, 10R, fine needle aspiration (smear slides and cell block):  - Fragments of cartilage, rare benign bronchial cells, and scant lymphoid component identified  - No evidence of malignancy    5. Lymph node, 4L, fine needle aspiration (smear slides and cell block):  - Benign bronchial cells and scant lymphoid component identified  - No evidence of malignancy     Comment: Interp By  RUSH Can MD, Signed on 07/23/2024 at 12:39   Comment  Abnormal   There is non-necrotizing granulomatous inflammation seen in the endobronchial lesion biopsy (specimen 1).  Special stains on this biopsy for fungal and acid-fast organisms are negative with appropriate controls.  There are rare poorly formed  non-necrotizing granulomas seen in the fine-needle aspiration of the station 7 lymph node (specimen 2).  While these findings are nonspecific, they can be seen with infection, reactive conditions, or connective tissue disorders, such as sarcoidosis,  among other causes.  There is no evidence of malignancy.  Clinical and radiographic correlation recommended.    Disclaimer  Abnormal   Screening was performed at [facility], [address] [place] [address].     Component      Latest Ref Rng 7/16/2021 6/3/2024 6/22/2024 6/23/2024 6/24/2024 6/25/2024   Sodium      136 - 145 mmol/L 136  135 (L)  135 (L)  137  137  137    Sodium            137    Potassium      3.5 - 5.1 mmol/L 3.9  4.1  4.4  3.7  4.0  3.7    Potassium            3.7    Chloride      95 - 110 mmol/L 103  100  100  104  106  109    Chloride            109    CO2      23 - 29 mmol/L 16 (L)  25  25  23  19 (L)  19 (L)    CO2            19 (L)    Glucose      70 - 110 mg/dL 234 (H)  165 (H)  104  80  81  79    Glucose            79    BUN      6 - 20 mg/dL 18  21 (H)  21 (H)  17  16  13    BUN            13    Creatinine      0.5 - 1.4 mg/dL 1.2  1.5 (H)  1.6 (H)  1.5 (H)  1.4  1.2    Creatinine            1.2    Calcium      8.7 - 10.5 mg/dL 9.9  12.1 (HH)  12.1 (HH)  11.1 (H)  10.5  9.3    Calcium            9.3    PROTEIN TOTAL      6.0 - 8.4 g/dL 9.1 (H)  11.0 (H)   10.5 (H)  9.4 (H)  9.5 (H)    Albumin      3.5 - 5.2 g/dL 3.6  2.9 (L)  2.8 (L)  2.7 (L)  2.6 (L)  2.5 (L)    Albumin            2.5 (L)    Albumin            2.5 (L)    BILIRUBIN TOTAL      0.1 - 1.0 mg/dL 0.6  0.4   0.6  0.6   0.5    ALP      55 - 135 U/L 55  100   92  85  92    AST      10 - 40 U/L 22  24   22  23  31    ALT      10 - 44 U/L 21  20   16  15  19    Anion Gap      8 - 16 mmol/L 17 (H)  10  10  10  12  9    Anion Gap            9    eGFR      >60 mL/min/1.73 m^2  40.6 !  37.6 !  41 !  44 !  53 !    eGFR            53 !      Component      Latest Ref Rng 6/26/2024 7/16/2024   Sodium      136 - 145 mmol/L 137  135 (L)    Potassium      3.5 - 5.1 mmol/L 3.7  4.7    Chloride      95 - 110 mmol/L 109  104    CO2      23 - 29 mmol/L 17 (L)  21 (L)    Glucose      70 - 110 mg/dL 76  108    BUN      6 - 20 mg/dL 13  19    Creatinine      0.5 - 1.4 mg/dL 1.1  1.4    Calcium      8.7 - 10.5 mg/dL 9.2  10.1    PROTEIN TOTAL      6.0 - 8.4 g/dL  11.0 (H)    Albumin      3.5 - 5.2 g/dL 2.4 (L)  3.0 (L)    BILIRUBIN TOTAL      0.1 - 1.0 mg/dL  0.5    ALP      55 - 135 U/L  117    AST      10 - 40 U/L  32    ALT      10 - 44 U/L  17    Anion Gap      8 - 16 mmol/L 11  10    eGFR      >60 mL/min/1.73 m^2 59 !  44 !       Legend:  (L) Low  (H) High  (HH) High Panic  ! Abnormal              Spirometry is normal. Spirometry remains unimproved following bronchodilator. Lung volumes show mild restriction is present. Airway mechanics show normal airway resistance and conductance. DLCO is mildly decreased. MVV is normal.   Â   Notes:   Â   The failure to demonstrate improvement in spirometry does not preclude a clinical response to a trial of bronchodilators. No recent hemoglobin value available. DLCO interpretation assumes a normal hemoglobin value.   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    This note was prepared using voice recognition system and is likely to have sound alike errors that may have been overlooked even after proof reading.  Please call me with any questions    Discussed diagnosis, its evaluation, treatment and usual course. All questions answered.    Thank you for the courtesy of participating in the care of this patient    Liang Lu MD      Personal Diagnostic Review  []  CXR    []  ECHO    []  ONSAT    []  6MWD    []  LABS    []  CHEST CT    []  PET CT    []  Biopsy results

## 2024-08-06 ENCOUNTER — PATIENT MESSAGE (OUTPATIENT)
Dept: HEMATOLOGY/ONCOLOGY | Facility: CLINIC | Age: 54
End: 2024-08-06
Payer: COMMERCIAL

## 2024-08-06 ENCOUNTER — LAB VISIT (OUTPATIENT)
Dept: LAB | Facility: HOSPITAL | Age: 54
End: 2024-08-06
Attending: INTERNAL MEDICINE
Payer: COMMERCIAL

## 2024-08-06 ENCOUNTER — OFFICE VISIT (OUTPATIENT)
Dept: HEMATOLOGY/ONCOLOGY | Facility: CLINIC | Age: 54
End: 2024-08-06
Payer: COMMERCIAL

## 2024-08-06 VITALS
BODY MASS INDEX: 29.71 KG/M2 | HEIGHT: 63 IN | HEART RATE: 56 BPM | OXYGEN SATURATION: 99 % | SYSTOLIC BLOOD PRESSURE: 151 MMHG | WEIGHT: 167.69 LBS | TEMPERATURE: 98 F | DIASTOLIC BLOOD PRESSURE: 94 MMHG

## 2024-08-06 DIAGNOSIS — R94.8 ABNORMAL PET SCAN OF MEDIASTINUM: ICD-10-CM

## 2024-08-06 DIAGNOSIS — N18.32 CHRONIC RENAL FAILURE, STAGE 3B: ICD-10-CM

## 2024-08-06 DIAGNOSIS — C90.00 MULTIPLE MYELOMA NOT HAVING ACHIEVED REMISSION: ICD-10-CM

## 2024-08-06 DIAGNOSIS — E04.1 THYROID NODULE: Primary | ICD-10-CM

## 2024-08-06 DIAGNOSIS — D86.2 SARCOIDOSIS OF LUNG WITH SARCOIDOSIS OF LYMPH NODES: ICD-10-CM

## 2024-08-06 DIAGNOSIS — D47.2 MGUS (MONOCLONAL GAMMOPATHY OF UNKNOWN SIGNIFICANCE): ICD-10-CM

## 2024-08-06 DIAGNOSIS — I10 PRIMARY HYPERTENSION: ICD-10-CM

## 2024-08-06 LAB
ALBUMIN SERPL BCP-MCNC: 2.9 G/DL (ref 3.5–5.2)
ALP SERPL-CCNC: 60 U/L (ref 55–135)
ALT SERPL W/O P-5'-P-CCNC: 10 U/L (ref 10–44)
ANION GAP SERPL CALC-SCNC: 7 MMOL/L (ref 8–16)
AST SERPL-CCNC: 9 U/L (ref 10–40)
BASOPHILS # BLD AUTO: 0.01 K/UL (ref 0–0.2)
BASOPHILS NFR BLD: 0.1 % (ref 0–1.9)
BILIRUB SERPL-MCNC: 0.5 MG/DL (ref 0.1–1)
BUN SERPL-MCNC: 26 MG/DL (ref 6–20)
CALCIUM SERPL-MCNC: 9 MG/DL (ref 8.7–10.5)
CHLORIDE SERPL-SCNC: 108 MMOL/L (ref 95–110)
CO2 SERPL-SCNC: 23 MMOL/L (ref 23–29)
CREAT SERPL-MCNC: 1.1 MG/DL (ref 0.5–1.4)
DIFFERENTIAL METHOD BLD: ABNORMAL
EOSINOPHIL # BLD AUTO: 0.1 K/UL (ref 0–0.5)
EOSINOPHIL NFR BLD: 0.8 % (ref 0–8)
ERYTHROCYTE [DISTWIDTH] IN BLOOD BY AUTOMATED COUNT: 14.6 % (ref 11.5–14.5)
EST. GFR  (NO RACE VARIABLE): 59 ML/MIN/1.73 M^2
GLUCOSE SERPL-MCNC: 111 MG/DL (ref 70–110)
HCT VFR BLD AUTO: 38.5 % (ref 37–48.5)
HGB BLD-MCNC: 12.6 G/DL (ref 12–16)
IMM GRANULOCYTES # BLD AUTO: 0.04 K/UL (ref 0–0.04)
IMM GRANULOCYTES NFR BLD AUTO: 0.5 % (ref 0–0.5)
LYMPHOCYTES # BLD AUTO: 1.4 K/UL (ref 1–4.8)
LYMPHOCYTES NFR BLD: 16.1 % (ref 18–48)
MCH RBC QN AUTO: 28.9 PG (ref 27–31)
MCHC RBC AUTO-ENTMCNC: 32.7 G/DL (ref 32–36)
MCV RBC AUTO: 88 FL (ref 82–98)
MONOCYTES # BLD AUTO: 1.1 K/UL (ref 0.3–1)
MONOCYTES NFR BLD: 12.9 % (ref 4–15)
NEUTROPHILS # BLD AUTO: 5.9 K/UL (ref 1.8–7.7)
NEUTROPHILS NFR BLD: 69.6 % (ref 38–73)
NRBC BLD-RTO: 0 /100 WBC
PLATELET # BLD AUTO: 429 K/UL (ref 150–450)
PMV BLD AUTO: 9.5 FL (ref 9.2–12.9)
POTASSIUM SERPL-SCNC: 4.4 MMOL/L (ref 3.5–5.1)
PROT SERPL-MCNC: 9.8 G/DL (ref 6–8.4)
RBC # BLD AUTO: 4.36 M/UL (ref 4–5.4)
SODIUM SERPL-SCNC: 138 MMOL/L (ref 136–145)
WBC # BLD AUTO: 8.43 K/UL (ref 3.9–12.7)

## 2024-08-06 PROCEDURE — 3008F BODY MASS INDEX DOCD: CPT | Mod: CPTII,S$GLB,, | Performed by: INTERNAL MEDICINE

## 2024-08-06 PROCEDURE — 99214 OFFICE O/P EST MOD 30 MIN: CPT | Mod: S$GLB,,, | Performed by: INTERNAL MEDICINE

## 2024-08-06 PROCEDURE — 3044F HG A1C LEVEL LT 7.0%: CPT | Mod: CPTII,S$GLB,, | Performed by: INTERNAL MEDICINE

## 2024-08-06 PROCEDURE — 1159F MED LIST DOCD IN RCRD: CPT | Mod: CPTII,S$GLB,, | Performed by: INTERNAL MEDICINE

## 2024-08-06 PROCEDURE — 80053 COMPREHEN METABOLIC PANEL: CPT | Performed by: INTERNAL MEDICINE

## 2024-08-06 PROCEDURE — 36415 COLL VENOUS BLD VENIPUNCTURE: CPT | Performed by: INTERNAL MEDICINE

## 2024-08-06 PROCEDURE — 1160F RVW MEDS BY RX/DR IN RCRD: CPT | Mod: CPTII,S$GLB,, | Performed by: INTERNAL MEDICINE

## 2024-08-06 PROCEDURE — 3077F SYST BP >= 140 MM HG: CPT | Mod: CPTII,S$GLB,, | Performed by: INTERNAL MEDICINE

## 2024-08-06 PROCEDURE — 99999 PR PBB SHADOW E&M-EST. PATIENT-LVL IV: CPT | Mod: PBBFAC,,, | Performed by: INTERNAL MEDICINE

## 2024-08-06 PROCEDURE — 85025 COMPLETE CBC W/AUTO DIFF WBC: CPT | Performed by: INTERNAL MEDICINE

## 2024-08-06 PROCEDURE — 3080F DIAST BP >= 90 MM HG: CPT | Mod: CPTII,S$GLB,, | Performed by: INTERNAL MEDICINE

## 2024-08-06 PROCEDURE — 4010F ACE/ARB THERAPY RXD/TAKEN: CPT | Mod: CPTII,S$GLB,, | Performed by: INTERNAL MEDICINE

## 2024-08-06 RX ORDER — NITROFURANTOIN 25; 75 MG/1; MG/1
100 CAPSULE ORAL DAILY
COMMUNITY

## 2024-08-19 ENCOUNTER — PATIENT OUTREACH (OUTPATIENT)
Dept: ADMINISTRATIVE | Facility: HOSPITAL | Age: 54
End: 2024-08-19
Payer: COMMERCIAL

## 2024-08-19 ENCOUNTER — TELEPHONE (OUTPATIENT)
Dept: NEPHROLOGY | Facility: CLINIC | Age: 54
End: 2024-08-19
Payer: COMMERCIAL

## 2024-08-19 NOTE — TELEPHONE ENCOUNTER
Called and spoke with patient. Informed that there are no opens available soon at the [facility] location. Patient will keep current appt.

## 2024-08-19 NOTE — TELEPHONE ENCOUNTER
----- Message from Kellie Ku sent at 8/19/2024 10:24 AM CDT -----  Contact: [phone]  Pt wants to know if Dr Sheldon would go to any other location besides [facility]? She would prefer [facility] if possible. Please call pt. Thanks

## 2024-08-20 ENCOUNTER — LAB VISIT (OUTPATIENT)
Dept: LAB | Facility: HOSPITAL | Age: 54
End: 2024-08-20
Attending: INTERNAL MEDICINE
Payer: COMMERCIAL

## 2024-08-20 DIAGNOSIS — E83.52 HYPERCALCEMIA: ICD-10-CM

## 2024-08-20 PROCEDURE — 80069 RENAL FUNCTION PANEL: CPT | Performed by: INTERNAL MEDICINE

## 2024-08-20 PROCEDURE — 36415 COLL VENOUS BLD VENIPUNCTURE: CPT | Performed by: INTERNAL MEDICINE

## 2024-08-20 PROCEDURE — 83970 ASSAY OF PARATHORMONE: CPT | Performed by: INTERNAL MEDICINE

## 2024-08-21 LAB
ALBUMIN SERPL BCP-MCNC: 3 G/DL (ref 3.5–5.2)
ANION GAP SERPL CALC-SCNC: 9 MMOL/L (ref 8–16)
BUN SERPL-MCNC: 17 MG/DL (ref 6–20)
CALCIUM SERPL-MCNC: 9.3 MG/DL (ref 8.7–10.5)
CHLORIDE SERPL-SCNC: 106 MMOL/L (ref 95–110)
CO2 SERPL-SCNC: 23 MMOL/L (ref 23–29)
CREAT SERPL-MCNC: 1.3 MG/DL (ref 0.5–1.4)
EST. GFR  (NO RACE VARIABLE): 48.3 ML/MIN/1.73 M^2
GLUCOSE SERPL-MCNC: 151 MG/DL (ref 70–110)
PHOSPHATE SERPL-MCNC: 2.5 MG/DL (ref 2.7–4.5)
POTASSIUM SERPL-SCNC: 4.6 MMOL/L (ref 3.5–5.1)
PTH-INTACT SERPL-MCNC: 91.3 PG/ML (ref 9–77)
SODIUM SERPL-SCNC: 138 MMOL/L (ref 136–145)

## 2024-08-28 ENCOUNTER — OFFICE VISIT (OUTPATIENT)
Dept: FAMILY MEDICINE | Facility: CLINIC | Age: 54
End: 2024-08-28
Payer: COMMERCIAL

## 2024-08-28 DIAGNOSIS — E11.65 TYPE 2 DIABETES MELLITUS WITH HYPERGLYCEMIA, WITHOUT LONG-TERM CURRENT USE OF INSULIN: Primary | ICD-10-CM

## 2024-08-28 PROCEDURE — 3066F NEPHROPATHY DOC TX: CPT | Mod: CPTII,95,, | Performed by: INTERNAL MEDICINE

## 2024-08-28 PROCEDURE — 3044F HG A1C LEVEL LT 7.0%: CPT | Mod: CPTII,95,, | Performed by: INTERNAL MEDICINE

## 2024-08-28 PROCEDURE — 4010F ACE/ARB THERAPY RXD/TAKEN: CPT | Mod: CPTII,95,, | Performed by: INTERNAL MEDICINE

## 2024-08-28 PROCEDURE — 3062F POS MACROALBUMINURIA REV: CPT | Mod: CPTII,95,, | Performed by: INTERNAL MEDICINE

## 2024-08-28 PROCEDURE — 99213 OFFICE O/P EST LOW 20 MIN: CPT | Mod: 95,,, | Performed by: INTERNAL MEDICINE

## 2024-08-28 RX ORDER — METFORMIN HYDROCHLORIDE 500 MG/1
500 TABLET ORAL 2 TIMES DAILY WITH MEALS
Qty: 180 TABLET | Refills: 1 | Status: SHIPPED | OUTPATIENT
Start: 2024-08-28 | End: 2025-02-24

## 2024-08-28 NOTE — PROGRESS NOTES
Subjective     Patient ID: Rosibel Herrera is a 54 y.o. female.    Chief Complaint: blood sugar    Patient presents via video call due to elevated blood sugars. She reports she started taking Prednisone for sarcoidosis 1 month ago. Since then her blood sugars have steadily climbed. She restarted Metformin 1 week ago and has noticed a slight decline.     Diabetes  She has type 2 diabetes mellitus. No MedicAlert identification noted. Pertinent negatives for hypoglycemia include no dizziness or headaches. Associated symptoms include blurred vision. Pertinent negatives for diabetes include no chest pain and no fatigue. Pertinent negatives for hypoglycemia complications include no blackouts, no hospitalization, no nocturnal hypoglycemia, no required assistance and no required glucagon injection. Symptoms are stable. Pertinent negatives for diabetic complications include no autonomic neuropathy, CVA, heart disease, nephropathy, peripheral neuropathy, PVD or retinopathy. There are no known risk factors for coronary artery disease. Current diabetic treatment includes diet. She is compliant with treatment most of the time. Her weight is stable. She is following a generally healthy diet. Meal planning includes avoidance of concentrated sweets. She has not had a previous visit with a dietitian. She participates in exercise intermittently. She monitors blood glucose at home 1-2 x per day. She monitors urine at home <1 x per month. Blood glucose monitoring compliance is good. Her home blood glucose trend is increasing steadily. She does not see a podiatrist.Eye exam is current.         Review of Systems   Constitutional:  Negative for chills and fatigue.   Eyes:  Positive for blurred vision.   Respiratory:  Negative for chest tightness and shortness of breath.    Cardiovascular:  Negative for chest pain and palpitations.   Gastrointestinal:  Negative for abdominal pain, constipation, diarrhea, nausea and vomiting.    Genitourinary:  Negative for frequency and urgency.   Neurological:  Negative for dizziness, numbness and headaches.          Objective       Current Outpatient Medications:     amLODIPine (NORVASC) 10 MG tablet, Take 1 tablet (10 mg total) by mouth once daily., Disp: 90 tablet, Rfl: 3    azelastine (ASTELIN) 137 mcg (0.1 %) nasal spray, 2 sprays by Nasal route 2 (two) times daily., Disp: , Rfl:     fluticasone propionate (FLONASE) 50 mcg/actuation nasal spray, by Each Nostril route daily as needed., Disp: , Rfl:     losartan (COZAAR) 50 MG tablet, Take 1 tablet (50 mg total) by mouth once daily., Disp: 90 tablet, Rfl: 3    metFORMIN (GLUCOPHAGE) 500 MG tablet, Take 1 tablet (500 mg total) by mouth 2 (two) times daily with meals., Disp: 180 tablet, Rfl: 1    montelukast (SINGULAIR) 10 mg tablet, Take 10 mg by mouth once daily., Disp: , Rfl:     nitrofurantoin, macrocrystal-monohydrate, (MACROBID) 100 MG capsule, Take 100 mg by mouth once daily. M/W/F, Disp: , Rfl:     predniSONE (DELTASONE) 20 MG tablet, Take 1 tablet (20 mg total) by mouth once daily., Disp: 30 tablet, Rfl: 2    sulfamethoxazole-trimethoprim 800-160mg (BACTRIM DS) 800-160 mg Tab, Take 1 tablet by mouth every Mon, Wed, Fri., Disp: 12 tablet, Rfl: 2     Physical Exam  Constitutional:       Appearance: Normal appearance.   Neurological:      General: No focal deficit present.      Mental Status: She is alert and oriented to person, place, and time.   Psychiatric:         Mood and Affect: Mood normal.         Behavior: Behavior normal.              VITAL SIGNS:  There were no vitals filed for this visit.    CURRENT BMI:   There is no height or weight on file to calculate BMI.        ~~~~~~~~~~~~~~~~~~~~~~~~~~~~~~~~~~~~~~~~~~~~~~~    LABS REVIEWED:    CBC:  Lab Results   Component Value Date    WBC 8.43 08/06/2024    RBC 4.36 08/06/2024    HGB 12.6 08/06/2024    HCT 38.5 08/06/2024    MCV 88 08/06/2024    MCH 28.9 08/06/2024    MCHC 32.7 08/06/2024     RDW 14.6 (H) 08/06/2024     08/06/2024    MPV 9.5 08/06/2024    GRAN 5.9 08/06/2024    GRAN 69.6 08/06/2024    LYMPH 1.4 08/06/2024    LYMPH 16.1 (L) 08/06/2024    MONO 1.1 (H) 08/06/2024    MONO 12.9 08/06/2024    EOS 0.1 08/06/2024    BASO 0.01 08/06/2024    EOSINOPHIL 0.8 08/06/2024    BASOPHIL 0.1 08/06/2024       CHEMISTRY:  Sodium   Date Value Ref Range Status   08/20/2024 138 136 - 145 mmol/L Final     Potassium   Date Value Ref Range Status   08/20/2024 4.6 3.5 - 5.1 mmol/L Final     Chloride   Date Value Ref Range Status   08/20/2024 106 95 - 110 mmol/L Final     CO2   Date Value Ref Range Status   08/20/2024 23 23 - 29 mmol/L Final     Glucose   Date Value Ref Range Status   08/20/2024 151 (H) 70 - 110 mg/dL Final     BUN   Date Value Ref Range Status   08/20/2024 17 6 - 20 mg/dL Final     Creatinine   Date Value Ref Range Status   08/20/2024 1.3 0.5 - 1.4 mg/dL Final     Calcium   Date Value Ref Range Status   08/20/2024 9.3 8.7 - 10.5 mg/dL Final     Total Protein   Date Value Ref Range Status   08/06/2024 9.8 (H) 6.0 - 8.4 g/dL Final     Albumin   Date Value Ref Range Status   08/20/2024 3.0 (L) 3.5 - 5.2 g/dL Final     Total Bilirubin   Date Value Ref Range Status   08/06/2024 0.5 0.1 - 1.0 mg/dL Final     Comment:     For infants and newborns, interpretation of results should be based  on gestational age, weight and in agreement with clinical  observations.    Premature Infant recommended reference ranges:  Up to 24 hours.............<8.0 mg/dL  Up to 48 hours............<12.0 mg/dL  3-5 days..................<15.0 mg/dL  6-29 days.................<15.0 mg/dL       Alkaline Phosphatase   Date Value Ref Range Status   08/06/2024 60 55 - 135 U/L Final     AST   Date Value Ref Range Status   08/06/2024 9 (L) 10 - 40 U/L Final     ALT   Date Value Ref Range Status   08/06/2024 10 10 - 44 U/L Final     Anion Gap   Date Value Ref Range Status   08/20/2024 9 8 - 16 mmol/L Final     eGFR   Date Value  Ref Range Status   08/20/2024 48.3 (A) >60 mL/min/1.73 m^2 Final       LIPID PANEL:  Lab Results   Component Value Date    CHOL 204 (H) 06/03/2024     Lab Results   Component Value Date    TRIG 188 (H) 06/03/2024     Lab Results   Component Value Date    HDL 33 (L) 06/03/2024     Lab Results   Component Value Date    LDLCALC 133.4 06/03/2024       THYROID:  Lab Results   Component Value Date    TSH 2.015 06/03/2024       DIABETES:  Lab Results   Component Value Date    HGBA1C 6.2 (H) 06/03/2024     Microalb/Creat Ratio   Date Value Ref Range Status   08/20/2024 339.0 (H) 0.0 - 30.0 ug/mg Final         Assessment and Plan     1. Type 2 diabetes mellitus with hyperglycemia, without long-term current use of insulin    Other orders  -     metFORMIN (GLUCOPHAGE) 500 MG tablet; Take 1 tablet (500 mg total) by mouth 2 (two) times daily with meals.  Dispense: 180 tablet; Refill: 1    Will refill Metformin. Advised patient to monitor blood sugar closely and to increase medication dosage as needed.    The patient location is: work  The chief complaint leading to consultation is: blood sugar    Visit type: audiovisual    Face to Face time with patient: 12  21 minutes of total time spent on the encounter, which includes face to face time and non-face to face time preparing to see the patient (eg, review of tests), Obtaining and/or reviewing separately obtained history, Documenting clinical information in the electronic or other health record, Independently interpreting results (not separately reported) and communicating results to the patient/family/caregiver, or Care coordination (not separately reported).         Each patient to whom he or she provides medical services by telemedicine is:  (1) informed of the relationship between the physician and patient and the respective role of any other health care provider with respect to management of the patient; and (2) notified that he or she may decline to receive medical services  by telemedicine and may withdraw from such care at any time.    Notes:        No follow-ups on file.

## 2024-08-29 ENCOUNTER — OFFICE VISIT (OUTPATIENT)
Dept: NEPHROLOGY | Facility: CLINIC | Age: 54
End: 2024-08-29
Payer: COMMERCIAL

## 2024-08-29 VITALS
DIASTOLIC BLOOD PRESSURE: 90 MMHG | SYSTOLIC BLOOD PRESSURE: 144 MMHG | BODY MASS INDEX: 29.88 KG/M2 | HEART RATE: 70 BPM | RESPIRATION RATE: 18 BRPM | HEIGHT: 63 IN | WEIGHT: 168.63 LBS

## 2024-08-29 DIAGNOSIS — R31.29 MICROSCOPIC HEMATURIA: ICD-10-CM

## 2024-08-29 DIAGNOSIS — N18.31 STAGE 3A CHRONIC KIDNEY DISEASE: Primary | ICD-10-CM

## 2024-08-29 DIAGNOSIS — N18.32 STAGE 3B CHRONIC KIDNEY DISEASE: ICD-10-CM

## 2024-08-29 DIAGNOSIS — D86.9 SARCOIDOSIS: ICD-10-CM

## 2024-08-29 DIAGNOSIS — I10 PRIMARY HYPERTENSION: ICD-10-CM

## 2024-08-29 PROCEDURE — 99999 PR PBB SHADOW E&M-EST. PATIENT-LVL IV: CPT | Mod: PBBFAC,,, | Performed by: INTERNAL MEDICINE

## 2024-08-29 NOTE — PROGRESS NOTES
Rosibel Herrera is a 54 y.o. female     HPI:    She was recently diagnosed with sarcoidosis in workup for hypercalcemia.  Additionally she was noted to have mildly decreased EGFR on routine laboratory studies.  Nephrology has been consulted for evaluation.  In the clinic today she is doing well and has no specific complaints.  She states that overall she is feeling better since she has been on treatment for her sarcoidosis.  Her calciums have returned to normal.  On review of her laboratory studies her baseline creatinine usually runs between about 1.1 and 1.4, which is normal.  Urinalysis continues to show microscopic hematuria with low-grade proteinuria.    PAST MEDICAL HISTORY:  She  has a past medical history of Calculus of kidney (4/9/2014 2:20:53 PM), Class 1 obesity due to excess calories with body mass index (BMI) of 30.0 to 30.9 in adult (06/23/2024), Complications affecting other specified body systems, hypertension (3/26/2014 1:38:44 PM), Dyslipidemia (01/05/2023), Excessive or frequent menstruation (3/20/2014 9:22:15 AM), Hypertension, Leiomyoma of uterus (3/20/2014 9:23:21 AM), and Type 2 diabetes mellitus (10/18/2021).    PAST SURGICAL HISTORY:  She  has a past surgical history that includes Lithotripsy; Endometrial ablation; Hysterectomy; Bronchoscopy (Bilateral, 7/17/2024); and Endobronchial ultrasound (Bilateral, 7/17/2024).    SOCIAL HISTORY:  She  reports that she has never smoked. She has never used smokeless tobacco. She reports current alcohol use. She reports that she does not use drugs.      FAMILY MEDICAL HISTORY:  Her family history includes Diabetes in her father.    Review of patient's allergies indicates:   Allergen Reactions    Tylox [oxycodone-acetaminophen]            Prior to Admission medications    Medication Sig Start Date End Date Taking? Authorizing Provider   amLODIPine (NORVASC) 10 MG tablet Take 1 tablet (10 mg total) by mouth once daily. 7/31/24 7/26/25 Yes Horace  Briana FAIR,    fluticasone propionate (FLONASE) 50 mcg/actuation nasal spray by Each Nostril route daily as needed. 7/19/22  Yes Provider, Historical   losartan (COZAAR) 50 MG tablet Take 1 tablet (50 mg total) by mouth once daily. 6/27/24 6/27/25 Yes Briana Vu DO   metFORMIN (GLUCOPHAGE) 500 MG tablet Take 1 tablet (500 mg total) by mouth 2 (two) times daily with meals. 8/28/24 2/24/25 Yes Briana Vu,    montelukast (SINGULAIR) 10 mg tablet Take 10 mg by mouth once daily.   Yes Provider, Historical   nitrofurantoin, macrocrystal-monohydrate, (MACROBID) 100 MG capsule Take 100 mg by mouth once daily. M/W/F   Yes Provider, Historical   predniSONE (DELTASONE) 20 MG tablet Take 1 tablet (20 mg total) by mouth once daily. 7/23/24 10/21/24 Yes Liang Lu MD   sulfamethoxazole-trimethoprim 800-160mg (BACTRIM DS) 800-160 mg Tab Take 1 tablet by mouth every Mon, Wed, Fri. 7/24/24 10/22/24 Yes Liang Lu MD   azelastine (ASTELIN) 137 mcg (0.1 %) nasal spray 2 sprays by Nasal route 2 (two) times daily.    Provider, Historical     Sodium   Date Value Ref Range Status   08/20/2024 138 136 - 145 mmol/L Final   08/06/2024 138 136 - 145 mmol/L Final   07/16/2024 135 (L) 136 - 145 mmol/L Final     Potassium   Date Value Ref Range Status   08/20/2024 4.6 3.5 - 5.1 mmol/L Final   08/06/2024 4.4 3.5 - 5.1 mmol/L Final   07/16/2024 4.7 3.5 - 5.1 mmol/L Final     Chloride   Date Value Ref Range Status   08/20/2024 106 95 - 110 mmol/L Final   08/06/2024 108 95 - 110 mmol/L Final   07/16/2024 104 95 - 110 mmol/L Final     CO2   Date Value Ref Range Status   08/20/2024 23 23 - 29 mmol/L Final   08/06/2024 23 23 - 29 mmol/L Final   07/16/2024 21 (L) 23 - 29 mmol/L Final     Glucose   Date Value Ref Range Status   08/20/2024 151 (H) 70 - 110 mg/dL Final   08/06/2024 111 (H) 70 - 110 mg/dL Final   07/16/2024 108 70 - 110 mg/dL Final     BUN   Date Value Ref Range Status   08/20/2024 17 6 - 20 mg/dL  Final   08/06/2024 26 (H) 6 - 20 mg/dL Final   07/16/2024 19 6 - 20 mg/dL Final     Creatinine   Date Value Ref Range Status   08/20/2024 1.3 0.5 - 1.4 mg/dL Final   08/06/2024 1.1 0.5 - 1.4 mg/dL Final   07/16/2024 1.4 0.5 - 1.4 mg/dL Final     Calcium   Date Value Ref Range Status   08/20/2024 9.3 8.7 - 10.5 mg/dL Final   08/06/2024 9.0 8.7 - 10.5 mg/dL Final   07/16/2024 10.1 8.7 - 10.5 mg/dL Final     Total Protein   Date Value Ref Range Status   08/06/2024 9.8 (H) 6.0 - 8.4 g/dL Final   07/16/2024 11.0 (H) 6.0 - 8.4 g/dL Final   06/25/2024 9.5 (H) 6.0 - 8.4 g/dL Final     Albumin   Date Value Ref Range Status   08/20/2024 3.0 (L) 3.5 - 5.2 g/dL Final   08/06/2024 2.9 (L) 3.5 - 5.2 g/dL Final   07/16/2024 3.0 (L) 3.5 - 5.2 g/dL Final     Total Bilirubin   Date Value Ref Range Status   08/06/2024 0.5 0.1 - 1.0 mg/dL Final     Comment:     For infants and newborns, interpretation of results should be based  on gestational age, weight and in agreement with clinical  observations.    Premature Infant recommended reference ranges:  Up to 24 hours.............<8.0 mg/dL  Up to 48 hours............<12.0 mg/dL  3-5 days..................<15.0 mg/dL  6-29 days.................<15.0 mg/dL     07/16/2024 0.5 0.1 - 1.0 mg/dL Final     Comment:     For infants and newborns, interpretation of results should be based  on gestational age, weight and in agreement with clinical  observations.    Premature Infant recommended reference ranges:  Up to 24 hours.............<8.0 mg/dL  Up to 48 hours............<12.0 mg/dL  3-5 days..................<15.0 mg/dL  6-29 days.................<15.0 mg/dL     06/25/2024 0.5 0.1 - 1.0 mg/dL Final     Comment:     For infants and newborns, interpretation of results should be based  on gestational age, weight and in agreement with clinical  observations.    Premature Infant recommended reference ranges:  Up to 24 hours.............<8.0 mg/dL  Up to 48 hours............<12.0 mg/dL  3-5  days..................<15.0 mg/dL  6-29 days.................<15.0 mg/dL       Alkaline Phosphatase   Date Value Ref Range Status   08/06/2024 60 55 - 135 U/L Final   07/16/2024 117 55 - 135 U/L Final   06/25/2024 92 55 - 135 U/L Final     AST   Date Value Ref Range Status   08/06/2024 9 (L) 10 - 40 U/L Final   07/16/2024 32 10 - 40 U/L Final   06/25/2024 31 10 - 40 U/L Final     ALT   Date Value Ref Range Status   08/06/2024 10 10 - 44 U/L Final   07/16/2024 17 10 - 44 U/L Final   06/25/2024 19 10 - 44 U/L Final     Anion Gap   Date Value Ref Range Status   08/20/2024 9 8 - 16 mmol/L Final   08/06/2024 7 (L) 8 - 16 mmol/L Final   07/16/2024 10 8 - 16 mmol/L Final     eGFR if    Date Value Ref Range Status   07/16/2021 60 >60 mL/min/1.73 m^2 Final     eGFR if non    Date Value Ref Range Status   07/16/2021 52 (A) >60 mL/min/1.73 m^2 Final     Comment:     Calculation used to obtain the estimated glomerular filtration  rate (eGFR) is the CKD-EPI equation.        RBC, UA   Date Value Ref Range Status   08/20/2024 >100 (H) 0 - 4 /hpf Final   06/22/2024 26 (H) 0 - 4 /hpf Final   07/16/2021 1 0 - 4 /hpf Final     WBC, UA   Date Value Ref Range Status   08/20/2024 30 (H) 0 - 5 /hpf Final   06/22/2024 8 (H) 0 - 5 /hpf Final   07/16/2021 3 0 - 5 /hpf Final     Bacteria   Date Value Ref Range Status   08/20/2024 Occasional None-Occ /hpf Final   06/22/2024 None None-Occ /hpf Final   07/16/2021 Moderate (A) None-Occ /hpf Final     Hyaline Casts, UA   Date Value Ref Range Status   08/20/2024 0 0-1/lpf /lpf Final   06/22/2024 4 (A) 0-1/lpf /lpf Final     Microscopic Comment   Date Value Ref Range Status   08/20/2024 SEE COMMENT  Final     Comment:     Other formed elements not mentioned in the report are not   present in the microscopic examination.      06/22/2024 SEE COMMENT  Final     Comment:     Other formed elements not mentioned in the report are not   present in the microscopic  "examination.      07/16/2021 SEE COMMENT  Final     Comment:     Other formed elements not mentioned in the report are not   present in the microscopic examination.        Protein, Urine   Date Value Ref Range Status   06/25/2024 24 (H) 0 - 15 mg/dL Final     Protein, Urine Random   Date Value Ref Range Status   08/20/2024 94 (H) 0 - 15 mg/dL Final   06/22/2024 43 (H) 0 - 15 mg/dL Final     Creatinine, Urine   Date Value Ref Range Status   08/20/2024 118.0 15.0 - 325.0 mg/dL Final   08/20/2024 118.0 15.0 - 325.0 mg/dL Final   06/23/2024 41.9 15.0 - 325.0 mg/dL Final     Prot/Creat Ratio, Urine   Date Value Ref Range Status   08/20/2024 0.80 (H) 0.00 - 0.20 Final   06/22/2024 0.63 (H) 0.00 - 0.20 Final          REVIEW OF SYSTEMS:  Patient has no fever, fatigue, visual changes, chest pain, edema, cough, dyspnea, nausea, vomiting, constipation, diarrhea, arthralgias, pruritis, dizziness, weakness, depression, confusion.        PHYSICAL EXAM:   height is 5' 3" (1.6 m) and weight is 76.5 kg (168 lb 10.4 oz). Her blood pressure is 144/90 (abnormal) and her pulse is 70. Her respiration is 18.   Gen: WDWN female in no apparent distress  Psych: Normal mood and affect  Skin: No rashes or ulcers  Eyes: Normal conjunctiva and lids, PERRLA  ENT: Normal hearing with no oropharyngeal lesions  Neck: No JVD  Chest: Clear with no rales, rhonchi, wheezing with normal effort  CV: Regular with no murmurs, gallops or rubs  Abd: Soft, nontender, no distension, positive bowel sounds  Ext: No cyanosis, clubbing or edema          IMPRESSION AND RECOMMENDATIONS:    1. CKD 3: She stage IIIA CKD based on persistent microscopic hematuria with proteinuria.  EGFR is mildly decreased however her creatinine is normal.  EGFR estimates are not particularly accurate when creatinine is normal.    She is on a RAAS inhibitor.  She had an episode of acute kidney injury several months ago where her creatinine increased to.  This was accompanied by " hypercalcemia.  Once her hypercalcemia was treated her creatinine returned to her usual baseline.    2. Hypertension: Blood pressure was mildly elevated in the clinic today.  She has not taken her blood pressure medications yet.  We discussed that her current therapy with steroids can increase her blood pressure.  It is important for her to follow a low-sodium diet.    3. Sarcoidosis: Currently being treated by pulmonology and Hematology-Oncology.  She is on steroids.  Calcium has returned to normal.      She continues to have microscopic hematuria which is a potential cause for false-positive proteinuria.  Proteinuria and microscopic hematuria can both also be seen with sarcoidosis involvement in the kidneys.  Her creatinine however is stable at this time.  Will plan to check a renal ultrasound in the next few weeks.  Will continue to monitor urinary protein excretion as well as UA and creatinine.    4. Microscopic hematuria:  This can commonly be seen with sarcoidosis.  Blood in the urine can also positive PC ratio and UA for protein.  As per above will check renal ultrasound.  Will continue to monitor.

## 2024-09-19 ENCOUNTER — TELEPHONE (OUTPATIENT)
Dept: FAMILY MEDICINE | Facility: CLINIC | Age: 54
End: 2024-09-19
Payer: COMMERCIAL

## 2024-09-19 VITALS — SYSTOLIC BLOOD PRESSURE: 124 MMHG | DIASTOLIC BLOOD PRESSURE: 81 MMHG

## 2024-10-07 ENCOUNTER — HOSPITAL ENCOUNTER (OUTPATIENT)
Dept: RADIOLOGY | Facility: HOSPITAL | Age: 54
Discharge: HOME | End: 2024-10-07
Attending: INTERNAL MEDICINE
Payer: COMMERCIAL

## 2024-10-07 DIAGNOSIS — D86.2 SARCOIDOSIS OF LUNG WITH SARCOIDOSIS OF LYMPH NODES: ICD-10-CM

## 2024-10-07 PROCEDURE — 71046 X-RAY EXAM CHEST 2 VIEWS: CPT | Mod: 26,,, | Performed by: RADIOLOGY

## 2024-10-07 PROCEDURE — 71046 X-RAY EXAM CHEST 2 VIEWS: CPT | Mod: TC

## 2024-10-17 ENCOUNTER — OFFICE VISIT (OUTPATIENT)
Dept: SLEEP MEDICINE | Facility: CLINIC | Age: 54
End: 2024-10-17
Attending: INTERNAL MEDICINE
Payer: COMMERCIAL

## 2024-10-17 ENCOUNTER — CLINICAL SUPPORT (OUTPATIENT)
Dept: PULMONOLOGY | Facility: CLINIC | Age: 54
End: 2024-10-17
Attending: INTERNAL MEDICINE
Payer: COMMERCIAL

## 2024-10-17 VITALS
OXYGEN SATURATION: 99 % | HEIGHT: 63 IN | WEIGHT: 172 LBS | DIASTOLIC BLOOD PRESSURE: 80 MMHG | RESPIRATION RATE: 16 BRPM | BODY MASS INDEX: 30.48 KG/M2 | SYSTOLIC BLOOD PRESSURE: 123 MMHG

## 2024-10-17 DIAGNOSIS — R06.02 SOB (SHORTNESS OF BREATH): Primary | ICD-10-CM

## 2024-10-17 DIAGNOSIS — R06.02 SOB (SHORTNESS OF BREATH): ICD-10-CM

## 2024-10-17 DIAGNOSIS — C85.90: ICD-10-CM

## 2024-10-17 DIAGNOSIS — D86.2 SARCOIDOSIS OF LUNG WITH SARCOIDOSIS OF LYMPH NODES: Primary | ICD-10-CM

## 2024-10-17 LAB
BRPFT: ABNORMAL
FEF 25 75 CHG: -8 %
FEF 25 75 LLN: 1.62
FEF 25 75 POST REF: 99.2 %
FEF 25 75 PRE REF: 107.8 %
FEF 25 75 REF: 3.02
FET100 CHG: 4.5 %
FEV1 CHG: 0.2 %
FEV1 FVC CHG: -1.1 %
FEV1 FVC LLN: 69
FEV1 FVC POST REF: 105.9 %
FEV1 FVC PRE REF: 107.1 %
FEV1 FVC REF: 80
FEV1 LLN: 1.59
FEV1 POST REF: 101.8 %
FEV1 PRE REF: 101.6 %
FEV1 REF: 2.16
FVC CHG: 1.3 %
FVC LLN: 2.02
FVC POST REF: 95.7 %
FVC PRE REF: 94.5 %
FVC REF: 2.7
PEF CHG: 23 %
PEF LLN: 3.75
PEF POST REF: 144.2 %
PEF PRE REF: 117.2 %
PEF REF: 5.7
POST FEF 25 75: 2.99 L/S (ref 1.62–4.41)
POST FET 100: 7.08 SEC
POST FEV1 FVC: 85.06 % (ref 68.93–89.99)
POST FEV1: 2.2 L (ref 1.59–2.7)
POST FVC: 2.58 L (ref 2.02–3.41)
POST PEF: 8.21 L/S (ref 3.75–7.64)
PRE FEF 25 75: 3.25 L/S (ref 1.62–4.41)
PRE FET 100: 6.78 SEC
PRE FEV1 FVC: 85.98 % (ref 68.93–89.99)
PRE FEV1: 2.19 L (ref 1.59–2.7)
PRE FVC: 2.55 L (ref 2.02–3.41)
PRE PEF: 6.68 L/S (ref 3.75–7.64)

## 2024-10-17 PROCEDURE — 4010F ACE/ARB THERAPY RXD/TAKEN: CPT | Mod: CPTII,S$GLB,, | Performed by: INTERNAL MEDICINE

## 2024-10-17 PROCEDURE — 3074F SYST BP LT 130 MM HG: CPT | Mod: CPTII,S$GLB,, | Performed by: INTERNAL MEDICINE

## 2024-10-17 PROCEDURE — 99999 PR PBB SHADOW E&M-EST. PATIENT-LVL I: CPT | Mod: PBBFAC,,,

## 2024-10-17 PROCEDURE — 1160F RVW MEDS BY RX/DR IN RCRD: CPT | Mod: CPTII,S$GLB,, | Performed by: INTERNAL MEDICINE

## 2024-10-17 PROCEDURE — 3044F HG A1C LEVEL LT 7.0%: CPT | Mod: CPTII,S$GLB,, | Performed by: INTERNAL MEDICINE

## 2024-10-17 PROCEDURE — 3062F POS MACROALBUMINURIA REV: CPT | Mod: CPTII,S$GLB,, | Performed by: INTERNAL MEDICINE

## 2024-10-17 PROCEDURE — 99999 PR PBB SHADOW E&M-EST. PATIENT-LVL III: CPT | Mod: PBBFAC,,, | Performed by: INTERNAL MEDICINE

## 2024-10-17 PROCEDURE — 1159F MED LIST DOCD IN RCRD: CPT | Mod: CPTII,S$GLB,, | Performed by: INTERNAL MEDICINE

## 2024-10-17 PROCEDURE — 99214 OFFICE O/P EST MOD 30 MIN: CPT | Mod: 25,S$GLB,, | Performed by: INTERNAL MEDICINE

## 2024-10-17 PROCEDURE — 3079F DIAST BP 80-89 MM HG: CPT | Mod: CPTII,S$GLB,, | Performed by: INTERNAL MEDICINE

## 2024-10-17 PROCEDURE — 3008F BODY MASS INDEX DOCD: CPT | Mod: CPTII,S$GLB,, | Performed by: INTERNAL MEDICINE

## 2024-10-17 PROCEDURE — 3066F NEPHROPATHY DOC TX: CPT | Mod: CPTII,S$GLB,, | Performed by: INTERNAL MEDICINE

## 2024-10-17 RX ORDER — ERGOCALCIFEROL 1.25 MG/1
50000 CAPSULE ORAL
Qty: 12 CAPSULE | Refills: 0 | Status: SHIPPED | OUTPATIENT
Start: 2024-10-17 | End: 2025-01-15

## 2024-10-17 NOTE — ASSESSMENT & PLAN NOTE
Patient to enroll in sarcoidosis support [facility]   Literature given to patient   Cont  prednisone 10 mg for the next 60 days   Follow up with Ophthalmology and Cardiology   Eye exam   Check vitamin-D : supplement    Normal Spiromtry  Calcium is normalized      Component Ref Range & Units 10 d ago 2 mo ago 3 mo ago   Interleukin 2 (IL-2) 175.3 - 858.2 pg/mL 459.7 1722.1 High  CM 1819.2 High  CM

## 2024-10-18 ENCOUNTER — PATIENT MESSAGE (OUTPATIENT)
Dept: INFUSION THERAPY | Facility: HOSPITAL | Age: 54
End: 2024-10-18
Payer: COMMERCIAL

## 2024-10-23 ENCOUNTER — PATIENT MESSAGE (OUTPATIENT)
Dept: HEMATOLOGY/ONCOLOGY | Facility: CLINIC | Age: 54
End: 2024-10-23
Payer: COMMERCIAL

## 2024-10-30 ENCOUNTER — HOSPITAL ENCOUNTER (OUTPATIENT)
Dept: RADIOLOGY | Facility: HOSPITAL | Age: 54
Discharge: HOME | End: 2024-10-30
Attending: INTERNAL MEDICINE
Payer: COMMERCIAL

## 2024-10-30 DIAGNOSIS — E04.1 THYROID NODULE: ICD-10-CM

## 2024-10-30 PROCEDURE — 76536 US EXAM OF HEAD AND NECK: CPT | Mod: TC

## 2024-10-30 PROCEDURE — 76536 US EXAM OF HEAD AND NECK: CPT | Mod: 26,,, | Performed by: RADIOLOGY

## 2024-11-06 ENCOUNTER — LAB VISIT (OUTPATIENT)
Dept: LAB | Facility: HOSPITAL | Age: 54
End: 2024-11-06
Attending: INTERNAL MEDICINE
Payer: COMMERCIAL

## 2024-11-06 DIAGNOSIS — D47.2 MGUS (MONOCLONAL GAMMOPATHY OF UNKNOWN SIGNIFICANCE): ICD-10-CM

## 2024-11-06 LAB
ALBUMIN SERPL BCP-MCNC: 3.1 G/DL (ref 3.5–5.2)
ALP SERPL-CCNC: 78 U/L (ref 40–150)
ALT SERPL W/O P-5'-P-CCNC: 18 U/L (ref 10–44)
ANION GAP SERPL CALC-SCNC: 8 MMOL/L (ref 8–16)
AST SERPL-CCNC: 19 U/L (ref 10–40)
BASOPHILS # BLD AUTO: 0.04 K/UL (ref 0–0.2)
BASOPHILS NFR BLD: 0.9 % (ref 0–1.9)
BILIRUB SERPL-MCNC: 0.5 MG/DL (ref 0.1–1)
BUN SERPL-MCNC: 16 MG/DL (ref 6–20)
CALCIUM SERPL-MCNC: 9.5 MG/DL (ref 8.7–10.5)
CHLORIDE SERPL-SCNC: 104 MMOL/L (ref 95–110)
CO2 SERPL-SCNC: 28 MMOL/L (ref 23–29)
CREAT SERPL-MCNC: 1 MG/DL (ref 0.5–1.4)
DIFFERENTIAL METHOD BLD: ABNORMAL
EOSINOPHIL # BLD AUTO: 0.2 K/UL (ref 0–0.5)
EOSINOPHIL NFR BLD: 5.2 % (ref 0–8)
ERYTHROCYTE [DISTWIDTH] IN BLOOD BY AUTOMATED COUNT: 13.3 % (ref 11.5–14.5)
EST. GFR  (NO RACE VARIABLE): >60 ML/MIN/1.73 M^2
GLUCOSE SERPL-MCNC: 178 MG/DL (ref 70–110)
HCT VFR BLD AUTO: 35.6 % (ref 37–48.5)
HGB BLD-MCNC: 11.8 G/DL (ref 12–16)
IMM GRANULOCYTES # BLD AUTO: 0.01 K/UL (ref 0–0.04)
IMM GRANULOCYTES NFR BLD AUTO: 0.2 % (ref 0–0.5)
LYMPHOCYTES # BLD AUTO: 1.2 K/UL (ref 1–4.8)
LYMPHOCYTES NFR BLD: 27.5 % (ref 18–48)
MCH RBC QN AUTO: 30.2 PG (ref 27–31)
MCHC RBC AUTO-ENTMCNC: 33.1 G/DL (ref 32–36)
MCV RBC AUTO: 91 FL (ref 82–98)
MONOCYTES # BLD AUTO: 0.6 K/UL (ref 0.3–1)
MONOCYTES NFR BLD: 12.4 % (ref 4–15)
NEUTROPHILS # BLD AUTO: 2.4 K/UL (ref 1.8–7.7)
NEUTROPHILS NFR BLD: 53.8 % (ref 38–73)
NRBC BLD-RTO: 0 /100 WBC
PLATELET # BLD AUTO: 309 K/UL (ref 150–450)
PMV BLD AUTO: 10 FL (ref 9.2–12.9)
POTASSIUM SERPL-SCNC: 3.7 MMOL/L (ref 3.5–5.1)
PROT SERPL-MCNC: 9 G/DL (ref 6–8.4)
RBC # BLD AUTO: 3.91 M/UL (ref 4–5.4)
SODIUM SERPL-SCNC: 140 MMOL/L (ref 136–145)
WBC # BLD AUTO: 4.43 K/UL (ref 3.9–12.7)

## 2024-11-06 PROCEDURE — 84165 PROTEIN E-PHORESIS SERUM: CPT | Performed by: INTERNAL MEDICINE

## 2024-11-06 PROCEDURE — 84165 PROTEIN E-PHORESIS SERUM: CPT | Mod: 26,,, | Performed by: PATHOLOGY

## 2024-11-06 PROCEDURE — 83521 IG LIGHT CHAINS FREE EACH: CPT | Mod: 59 | Performed by: INTERNAL MEDICINE

## 2024-11-06 PROCEDURE — 82232 ASSAY OF BETA-2 PROTEIN: CPT | Performed by: INTERNAL MEDICINE

## 2024-11-06 PROCEDURE — 80053 COMPREHEN METABOLIC PANEL: CPT | Performed by: INTERNAL MEDICINE

## 2024-11-06 PROCEDURE — 85025 COMPLETE CBC W/AUTO DIFF WBC: CPT | Performed by: INTERNAL MEDICINE

## 2024-11-06 PROCEDURE — 36415 COLL VENOUS BLD VENIPUNCTURE: CPT | Performed by: INTERNAL MEDICINE

## 2024-11-07 LAB
ALBUMIN SERPL ELPH-MCNC: 3.58 G/DL (ref 3.35–5.55)
ALPHA1 GLOB SERPL ELPH-MCNC: 0.33 G/DL (ref 0.17–0.41)
ALPHA2 GLOB SERPL ELPH-MCNC: 0.82 G/DL (ref 0.43–0.99)
B-GLOBULIN SERPL ELPH-MCNC: 1.02 G/DL (ref 0.5–1.1)
B2 MICROGLOB SERPL-MCNC: 3.7 UG/ML (ref 0–2.5)
GAMMA GLOB SERPL ELPH-MCNC: 2.96 G/DL (ref 0.67–1.58)
KAPPA LC SER QL IA: 37.54 MG/DL (ref 0.33–1.94)
KAPPA LC/LAMBDA SER IA: 13.75 (ref 0.26–1.65)
LAMBDA LC SER QL IA: 2.73 MG/DL (ref 0.57–2.63)
PATHOLOGIST INTERPRETATION SPE: NORMAL
PROT SERPL-MCNC: 8.7 G/DL (ref 6–8.4)

## 2024-11-08 ENCOUNTER — OFFICE VISIT (OUTPATIENT)
Dept: HEMATOLOGY/ONCOLOGY | Facility: CLINIC | Age: 54
End: 2024-11-08
Payer: COMMERCIAL

## 2024-11-08 VITALS
HEIGHT: 63 IN | WEIGHT: 173.38 LBS | OXYGEN SATURATION: 99 % | TEMPERATURE: 97 F | SYSTOLIC BLOOD PRESSURE: 160 MMHG | BODY MASS INDEX: 30.72 KG/M2 | HEART RATE: 68 BPM | DIASTOLIC BLOOD PRESSURE: 95 MMHG

## 2024-11-08 DIAGNOSIS — L93.0 DISCOID LUPUS: ICD-10-CM

## 2024-11-08 DIAGNOSIS — D86.2 SARCOIDOSIS OF LUNG WITH SARCOIDOSIS OF LYMPH NODES: ICD-10-CM

## 2024-11-08 DIAGNOSIS — D47.2 MGUS (MONOCLONAL GAMMOPATHY OF UNKNOWN SIGNIFICANCE): Primary | ICD-10-CM

## 2024-11-08 PROCEDURE — 99999 PR PBB SHADOW E&M-EST. PATIENT-LVL IV: CPT | Mod: PBBFAC,,,

## 2024-11-08 RX ORDER — PREDNISONE 20 MG/1
20 TABLET ORAL
COMMUNITY
Start: 2024-10-17

## 2024-11-08 NOTE — ASSESSMENT & PLAN NOTE
BmBx 07/2024, unremarkable, however, very limited sample    WILL 06/23/24: IgG kappa specific monoclonal protein identified.   UIFE 06/24/24: gG kappa and kappa light chain specific monoclonal bands present.   SPEP 10/30/24: Mildly increased total protein. Two closely adjacent paraprotein   peaks in near-gamma = 1.51 g/dL (previously, 1.58 g/dL) and mid-gamma = 0.57 g/dL (previously 0.6 g/dL).   FLC-R10/30/24: 13.75  UPEP in process    Monoclonal protein present but <3.0 g/dL  · No CRAB features or other indicators of active myeloma     Abnormal paraproteins can be associated with sarcoidosis, and the two conditions can be diagnosed simultaneously   Plan for continued surveillance at this time with follow-up in 3 months with repeat labs prior

## 2024-11-08 NOTE — PROGRESS NOTES
Subjective:      Patient ID: Rosibel Herrera is a 54 y.o. female.    Chief Complaint: No chief complaint on file.      HPI:  Ms. Herrera is a very pleasant 54-year-old female with history of discoid lupus and newly diagnosed sarcoidosis of lung who presents today for lab review for further evaluation of paraproteinemia, of note, UPEP in process.  Pt seen in Heme/Onc after hospital admission for hypercalcemia and subcutaneous nodules and marked abnormalities in M-spike. Per review of the medical record and patient account, she was in her usual state of good health up until June of this year.  She had been kind of having general fatigue in not feeling quite right and presented to Dr. Vu to establish care with a PCP.  As part of the workup routine lab work was done which showed an elevation in her calcium up to 12.  There was concern for dehydration and obviously electrolyte abnormalities and as such she was referred to nephrology.  Additional labs were done in preparation for a nephrology appointment and her calcium was again 12.1, with a corrected calcium of 13.1 and she was fatigued therefore she was instructed to present to the emergency department by the nephrologist on-call.  She presented to the ED here at [facility] and was subsequently admitted to the hospital medicine service.  During that admission she was found to have abnormal kappa/lambda light chains and persistent hypercalcemia despite adequate fluid resuscitation.  She was then seen by Dr. Alford with Medical Oncology and told she likely there has lymphoma or multiple myeloma.  She was given bisphosphonates with a good response and her calcium levels returned to normal.  She was subsequently discharged home and then had a PET-CT scan done as an outpatient.  That PET-CT scan on 07/08/2024 showed multiple sites of abnormal FDG uptake within the soft tissues of the neck, face, left lobe of the thyroid, mediastinal and bilateral hilar  lymphadenopathy, as well as some FDG avid lymph nodes within the abdomen pelvis, and bilateral inguinal regions.  She also has a questionable area of FDG avidity in the distal femur as well as within the soft tissues inferior and posterior to the patella on the left.  She also had extensive uptake within the musculature of the calf bilaterally and an enlarged spleen.  She was discussed at multidisciplinary tumor board and was [facility] biopsy of subcutaneous nodules on skin and Pulmonology for EBUS. Biopsy results were consistent with sarcoidosis and pt currently with routine f/u in pulmonology for treatment.    Today, pt states overall she is feeling much better with more energy and feeling more like herself prior to hospitalization daily. Denies n/v/d/c, fever, chills, sob, cp, abnormal bleeding, unintentional wt loss, new or worsening bone pain.    Social History     Socioeconomic History    Marital status:    Tobacco Use    Smoking status: Never    Smokeless tobacco: Never   Substance and Sexual Activity    Alcohol use: Yes     Comment: rarely    Drug use: No    Sexual activity: Yes     Partners: Male     Birth control/protection: Post-menopausal     Social Drivers of Health     Financial Resource Strain: Low Risk  (5/30/2024)    Overall Financial Resource Strain (CARDIA)     Difficulty of Paying Living Expenses: Not hard at all   Food Insecurity: No Food Insecurity (5/30/2024)    Hunger Vital Sign     Worried About Running Out of Food in the Last Year: Never true     Ran Out of Food in the Last Year: Never true   Physical Activity: Insufficiently Active (5/30/2024)    Exercise Vital Sign     Days of Exercise per Week: 2 days     Minutes of Exercise per Session: 30 min   Stress: No Stress Concern Present (5/30/2024)    [employer] - Occupational Stress Questionnaire     Feeling of Stress : Only a little   Housing Stability: Unknown (5/30/2024)    Housing  Stability Vital Sign     Unable to Pay for Housing in the Last Year: No       Family History   Problem Relation Name Age of Onset    Diabetes Father         Past Surgical History:   Procedure Laterality Date    BRONCHOSCOPY Bilateral 7/17/2024    Procedure: Bronchoscopy - insert lighted tube into airway to take a biopsy of lung;  Surgeon: Liang Lu MD;  Location: [facility];  Service: Pulmonary;  Laterality: Bilateral;    ENDOBRONCHIAL ULTRASOUND Bilateral 7/17/2024    Procedure: ENDOBRONCHIAL ULTRASOUND (EBUS);  Surgeon: Liang Lu MD;  Location: [facility];  Service: Pulmonary;  Laterality: Bilateral;    ENDOMETRIAL ABLATION      HYSTERECTOMY      fibroids    LITHOTRIPSY         Past Medical History:   Diagnosis Date    Calculus of kidney 4/9/2014 2:20:53 PM    [facility] Historical - Urology: KidneyStones-No Additional Notes    Class 1 obesity due to excess calories with body mass index (BMI) of 30.0 to 30.9 in adult 06/23/2024    Complications affecting other specified body systems, hypertension 3/26/2014 1:38:44 PM    [facility] Historical - Cardiovascular: Hypertension-No Additional Notes    Dyslipidemia 01/05/2023    Excessive or frequent menstruation 3/20/2014 9:22:15 AM    [facility] - [facility]: Bleeding, Menorrhagia-No Additional Notes    Hypertension     Leiomyoma of uterus 3/20/2014 9:23:21 AM    [facility] - [facility]: Fibroids/Leiomyoma of Uterus-2.1x1.8cm (POST/LT); 3.9x3.5cm (ANT TO ENDO)    Type 2 diabetes mellitus 10/18/2021       Review of Systems   Constitutional:  Negative for activity change, appetite change, chills, fatigue and fever.   HENT:  Negative for congestion, facial swelling, nosebleeds, rhinorrhea, sore throat and trouble swallowing.    Eyes:  Negative for photophobia, pain and visual disturbance.   Respiratory:  Negative for cough, shortness of breath and wheezing.    Cardiovascular:  Negative for chest pain, palpitations and  leg swelling.   Gastrointestinal:  Negative for abdominal distention, abdominal pain, anal bleeding, blood in stool, constipation, diarrhea, nausea, rectal pain and vomiting.   Genitourinary:  Negative for difficulty urinating, dysuria, hematuria and vaginal bleeding.   Musculoskeletal:  Negative for arthralgias.   Skin:  Negative for color change, pallor, rash and wound.   Neurological:  Negative for dizziness, light-headedness, numbness and headaches.   Hematological:  Negative for adenopathy. Does not bruise/bleed easily.   Psychiatric/Behavioral:  The patient is not nervous/anxious.        Medication List with Changes/Refills   Current Medications    AMLODIPINE (NORVASC) 10 MG TABLET    Take 1 tablet (10 mg total) by mouth once daily.    AZELASTINE (ASTELIN) 137 MCG (0.1 %) NASAL SPRAY    2 sprays by Nasal route 2 (two) times daily.    ERGOCALCIFEROL (ERGOCALCIFEROL) 50,000 UNIT CAP    Take 1 capsule (50,000 Units total) by mouth every 7 days.    FLUTICASONE PROPIONATE (FLONASE) 50 MCG/ACTUATION NASAL SPRAY    by Each Nostril route daily as needed.    LOSARTAN (COZAAR) 50 MG TABLET    Take 1 tablet (50 mg total) by mouth once daily.    METFORMIN (GLUCOPHAGE) 500 MG TABLET    Take 1 tablet (500 mg total) by mouth 2 (two) times daily with meals.    MONTELUKAST (SINGULAIR) 10 MG TABLET    Take 10 mg by mouth once daily.    NITROFURANTOIN, MACROCRYSTAL-MONOHYDRATE, (MACROBID) 100 MG CAPSULE    Take 100 mg by mouth once daily. M/W/F    PREDNISONE (DELTASONE) 20 MG TABLET    Take 20 mg by mouth. 10mg        Objective:     Vitals:    11/08/24 1423   BP: (!) 160/95   Pulse: 68   Temp: 97.1 °F (36.2 °C)       Physical Exam  Constitutional:       Appearance: Normal appearance.   HENT:      Head: Normocephalic.   Eyes:      Conjunctiva/sclera: Conjunctivae normal.   Cardiovascular:      Rate and Rhythm: Normal rate.   Pulmonary:      Effort: Pulmonary effort is normal.   Abdominal:      General: Abdomen is flat.    Musculoskeletal:         General: Normal range of motion.   Skin:     General: Skin is warm and dry.   Neurological:      Mental Status: She is alert and oriented to person, place, and time.   Psychiatric:         Mood and Affect: Mood normal.         Behavior: Behavior normal.         Thought Content: Thought content normal.         Judgment: Judgment normal.       Lab Results   Component Value Date    WBC 4.43 11/06/2024    HGB 11.8 (L) 11/06/2024    HCT 35.6 (L) 11/06/2024    MCV 91 11/06/2024     11/06/2024       Lab Results   Component Value Date     11/06/2024    K 3.7 11/06/2024     11/06/2024    CO2 28 11/06/2024    BUN 16 11/06/2024    CREATININE 1.0 11/06/2024    CALCIUM 9.5 11/06/2024    ANIONGAP 8 11/06/2024    ESTGFRAFRICA 60 07/16/2021    EGFRNONAA 52 (A) 07/16/2021     Lab Results   Component Value Date    ALT 18 11/06/2024    AST 19 11/06/2024    ALKPHOS 78 11/06/2024    BILITOT 0.5 11/06/2024       Assessment/Plan:     Problem List Items Addressed This Visit       Discoid lupus     discoid lupus the skin on her face since 1986          Relevant Orders    CBC Auto Differential    Comprehensive Metabolic Panel    Protein Electrophoresis, Serum    Immunoglobulin Free LT Chains Blood    Immunofixation Electrophoresis    Immunoglobulins (IgG, IgA, IgM) Quantitative    Beta 2 Microglobulin, Serum    Sarcoidosis of lung with sarcoidosis of lymph nodes     Continues close f/u with pulmonology  Currently taking prednisone 10 mg daily         Relevant Orders    CBC Auto Differential    Comprehensive Metabolic Panel    Protein Electrophoresis, Serum    Immunoglobulin Free LT Chains Blood    Immunofixation Electrophoresis    Immunoglobulins (IgG, IgA, IgM) Quantitative    Beta 2 Microglobulin, Serum    MGUS (monoclonal gammopathy of unknown significance) - Primary     BmBx 07/2024, unremarkable, however, very limited sample    WILL 06/23/24: IgG kappa specific monoclonal protein identified.    UIFE 06/24/24: gG kappa and kappa light chain specific monoclonal bands present.   SPEP 10/30/24: Mildly increased total protein. Two closely adjacent paraprotein   peaks in near-gamma = 1.51 g/dL (previously, 1.58 g/dL) and mid-gamma = 0.57 g/dL (previously 0.6 g/dL).   FLC-R10/30/24: 13.75  UPEP in process    Monoclonal protein present but <3.0 g/dL  · No CRAB features or other indicators of active myeloma     Abnormal paraproteins can be associated with sarcoidosis, and the two conditions can be diagnosed simultaneously   Plan for continued surveillance at this time with follow-up in 3 months with repeat labs prior               Relevant Orders    CBC Auto Differential    Comprehensive Metabolic Panel    Protein Electrophoresis, Serum    Immunoglobulin Free LT Chains Blood    Immunofixation Electrophoresis    Immunoglobulins (IgG, IgA, IgM) Quantitative    Beta 2 Microglobulin, Serum           Med Onc Chart Routing      Follow up with physician 3 months. with Dr. Alford with labs one week prior   Follow up with [facility]    Infusion scheduling note    Injection scheduling note    Labs CBC, CMP, free light chains, SPEP, other and beta 2 microglobulin   Scheduling:  Preferred lab:  Lab interval:     Imaging    Pharmacy appointment    Other referrals                 SAIDA ParkerP-JUANCHO  Hematology/Oncology

## 2024-12-04 DIAGNOSIS — E11.9 TYPE 2 DIABETES MELLITUS WITHOUT COMPLICATION: ICD-10-CM

## 2024-12-17 ENCOUNTER — PATIENT OUTREACH (OUTPATIENT)
Dept: ADMINISTRATIVE | Facility: HOSPITAL | Age: 54
End: 2024-12-17
Payer: COMMERCIAL

## 2024-12-23 ENCOUNTER — HOSPITAL ENCOUNTER (OUTPATIENT)
Dept: RADIOLOGY | Facility: HOSPITAL | Age: 54
Discharge: HOME | End: 2024-12-23
Attending: INTERNAL MEDICINE
Payer: COMMERCIAL

## 2024-12-23 DIAGNOSIS — I10 PRIMARY HYPERTENSION: ICD-10-CM

## 2024-12-23 DIAGNOSIS — N18.31 STAGE 3A CHRONIC KIDNEY DISEASE: ICD-10-CM

## 2024-12-23 PROCEDURE — 76770 US EXAM ABDO BACK WALL COMP: CPT | Mod: 26,,, | Performed by: RADIOLOGY

## 2024-12-23 PROCEDURE — 76770 US EXAM ABDO BACK WALL COMP: CPT | Mod: TC

## 2024-12-24 ENCOUNTER — TELEPHONE (OUTPATIENT)
Dept: FAMILY MEDICINE | Facility: CLINIC | Age: 54
End: 2024-12-24
Payer: COMMERCIAL

## 2024-12-30 ENCOUNTER — OFFICE VISIT (OUTPATIENT)
Dept: NEPHROLOGY | Facility: CLINIC | Age: 54
End: 2024-12-30
Payer: COMMERCIAL

## 2024-12-30 DIAGNOSIS — E83.52 HYPERCALCEMIA: ICD-10-CM

## 2024-12-30 DIAGNOSIS — I10 PRIMARY HYPERTENSION: ICD-10-CM

## 2024-12-30 DIAGNOSIS — R80.1 PERSISTENT PROTEINURIA: ICD-10-CM

## 2024-12-30 DIAGNOSIS — N18.31 STAGE 3A CHRONIC KIDNEY DISEASE: Primary | ICD-10-CM

## 2024-12-30 PROCEDURE — 1160F RVW MEDS BY RX/DR IN RCRD: CPT | Mod: CPTII,95,, | Performed by: INTERNAL MEDICINE

## 2024-12-30 PROCEDURE — 3066F NEPHROPATHY DOC TX: CPT | Mod: CPTII,95,, | Performed by: INTERNAL MEDICINE

## 2024-12-30 PROCEDURE — 3044F HG A1C LEVEL LT 7.0%: CPT | Mod: CPTII,95,, | Performed by: INTERNAL MEDICINE

## 2024-12-30 PROCEDURE — 4010F ACE/ARB THERAPY RXD/TAKEN: CPT | Mod: CPTII,95,, | Performed by: INTERNAL MEDICINE

## 2024-12-30 PROCEDURE — 99214 OFFICE O/P EST MOD 30 MIN: CPT | Mod: 95,,, | Performed by: INTERNAL MEDICINE

## 2024-12-30 PROCEDURE — 3062F POS MACROALBUMINURIA REV: CPT | Mod: CPTII,95,, | Performed by: INTERNAL MEDICINE

## 2024-12-30 PROCEDURE — 1159F MED LIST DOCD IN RCRD: CPT | Mod: CPTII,95,, | Performed by: INTERNAL MEDICINE

## 2024-12-30 NOTE — PROGRESS NOTES
The patient location is:  Work  The chief complaint leading to consultation is:  CKD, lupus, hypertension    Visit type: audiovisual    Face to Face time with patient:  15  30 minutes of total time spent on the encounter, which includes face to face time and non-face to face time preparing to see the patient (eg, review of tests), Obtaining and/or reviewing separately obtained history, Documenting clinical information in the electronic or other health record, Independently interpreting results (not separately reported) and communicating results to the patient/family/caregiver, or Care coordination (not separately reported).         Each patient to whom he or she provides medical services by telemedicine is:  (1) informed of the relationship between the physician and patient and the respective role of any other health care provider with respect to management of the patient; and (2) notified that he or she may decline to receive medical services by telemedicine and may withdraw from such care at any time.    Notes:     Subjective:       Patient ID: Rosibel Herrera is a 54 y.o. female.    Chief Complaint:  As above    HPI    She presents to clinic today for routine follow-up.  Since her last office visit she has been doing well and has no specific or new complaints.  Her laboratory studies and medications were reviewed.  All Nephrology related questions were answered to her satisfaction.    Review of Systems   Constitutional: Negative.    HENT: Negative.     Respiratory: Negative.     Cardiovascular: Negative.    Gastrointestinal: Negative.    Genitourinary: Negative.    Musculoskeletal: Negative.    Skin: Negative.        There were no vitals taken for this visit.    Lab Results   Component Value Date    WBC 4.43 11/06/2024    HGB 11.8 (L) 11/06/2024    HCT 35.6 (L) 11/06/2024    MCV 91 11/06/2024     11/06/2024      BMP  Lab Results   Component Value Date     12/23/2024    K 3.8 12/23/2024      12/23/2024    CO2 23 12/23/2024    BUN 20 12/23/2024    CREATININE 1.1 12/23/2024    CALCIUM 9.7 12/23/2024    ANIONGAP 12 12/23/2024    ESTGFRAFRICA 60 07/16/2021    EGFRNONAA 52 (A) 07/16/2021     CMP  Sodium   Date Value Ref Range Status   12/23/2024 140 136 - 145 mmol/L Final     Potassium   Date Value Ref Range Status   12/23/2024 3.8 3.5 - 5.1 mmol/L Final     Chloride   Date Value Ref Range Status   12/23/2024 105 95 - 110 mmol/L Final     CO2   Date Value Ref Range Status   12/23/2024 23 23 - 29 mmol/L Final     Glucose   Date Value Ref Range Status   12/23/2024 74 70 - 110 mg/dL Final     BUN   Date Value Ref Range Status   12/23/2024 20 6 - 20 mg/dL Final     Creatinine   Date Value Ref Range Status   12/23/2024 1.1 0.5 - 1.4 mg/dL Final     Calcium   Date Value Ref Range Status   12/23/2024 9.7 8.7 - 10.5 mg/dL Final     Total Protein   Date Value Ref Range Status   11/06/2024 9.0 (H) 6.0 - 8.4 g/dL Final     Albumin   Date Value Ref Range Status   12/23/2024 3.2 (L) 3.5 - 5.2 g/dL Final     Total Bilirubin   Date Value Ref Range Status   11/06/2024 0.5 0.1 - 1.0 mg/dL Final     Comment:     For infants and newborns, interpretation of results should be based  on gestational age, weight and in agreement with clinical  observations.    Premature Infant recommended reference ranges:  Up to 24 hours.............<8.0 mg/dL  Up to 48 hours............<12.0 mg/dL  3-5 days..................<15.0 mg/dL  6-29 days.................<15.0 mg/dL       Alkaline Phosphatase   Date Value Ref Range Status   11/06/2024 78 40 - 150 U/L Final     AST   Date Value Ref Range Status   11/06/2024 19 10 - 40 U/L Final     ALT   Date Value Ref Range Status   11/06/2024 18 10 - 44 U/L Final     Anion Gap   Date Value Ref Range Status   12/23/2024 12 8 - 16 mmol/L Final     eGFR if    Date Value Ref Range Status   07/16/2021 60 >60 mL/min/1.73 m^2 Final     eGFR if non    Date Value Ref Range Status    07/16/2021 52 (A) >60 mL/min/1.73 m^2 Final     Comment:     Calculation used to obtain the estimated glomerular filtration  rate (eGFR) is the CKD-EPI equation.        Current Outpatient Medications on File Prior to Visit   Medication Sig Dispense Refill    amLODIPine (NORVASC) 10 MG tablet Take 1 tablet (10 mg total) by mouth once daily. 90 tablet 3    azelastine (ASTELIN) 137 mcg (0.1 %) nasal spray 2 sprays by Nasal route 2 (two) times daily.      ergocalciferol (ERGOCALCIFEROL) 50,000 unit Cap Take 1 capsule (50,000 Units total) by mouth every 7 days. 12 capsule 0    fluticasone propionate (FLONASE) 50 mcg/actuation nasal spray by Each Nostril route daily as needed.      losartan (COZAAR) 50 MG tablet Take 1 tablet (50 mg total) by mouth once daily. 90 tablet 3    metFORMIN (GLUCOPHAGE) 500 MG tablet Take 1 tablet (500 mg total) by mouth 2 (two) times daily with meals. 180 tablet 1    montelukast (SINGULAIR) 10 mg tablet Take 10 mg by mouth once daily.      nitrofurantoin, macrocrystal-monohydrate, (MACROBID) 100 MG capsule Take 100 mg by mouth once daily. M/W/F      predniSONE (DELTASONE) 20 MG tablet Take 20 mg by mouth. 10mg       No current facility-administered medications on file prior to visit.            Objective:            Physical Exam  Vitals reviewed: Physical exam was not performed, virtual visit.           Assessment and plan:    1. CKD 3A: She has late stage II early stage IIIA CKD based strictly on EGFR calculation.  Creatinine is normal at 1.1.  EGFR is essentially normal for her age.  Urinalysis is bland with low-grade proteinuria.    She does not consistently take losartan which is listed on her med list.  She uses at only if her blood pressure is elevated.  She states she has not had to take it in quite some time.    2. Hypertension: She reports her blood pressure is well controlled.  Primarily she takes amlodipine 10 mg daily.    3. Proteinuria: She has persistent low-grade proteinuria.   This is been present for quite some time.  Most recent PC ratio was about 650 mg.  Will continue to monitor.    4. Hypercalcemia: Serum calcium has improved to 9.7 and albumin of 3.2.  Associated with sarcoidosis.      Afshin Sheldon MD

## 2025-01-06 DIAGNOSIS — D86.2 SARCOIDOSIS OF LUNG WITH SARCOIDOSIS OF LYMPH NODES: ICD-10-CM

## 2025-01-06 RX ORDER — ERGOCALCIFEROL 1.25 MG/1
50000 CAPSULE ORAL
Qty: 12 CAPSULE | Refills: 0 | Status: SHIPPED | OUTPATIENT
Start: 2025-01-06

## 2025-01-15 NOTE — PROGRESS NOTES
The patient location is: [facility]  The chief complaint leading to consultation is:   Chief Complaint   Patient presents with    Sarcoidosis        Visit type: audiovisual    Face to Face time with patient: 6 mins  30 minutes of total time spent on the encounter, which includes face to face time and non-face to face time preparing to see the patient (eg, review of tests), Obtaining and/or reviewing separately obtained history, Documenting clinical information in the electronic or other health record, Independently interpreting results (not separately reported) and communicating results to the patient/family/caregiver, or Care coordination (not separately reported).         Each patient to whom he or she provides medical services by telemedicine is:  (1) informed of the relationship between the physician and patient and the respective role of any other health care provider with respect to management of the patient; and (2) notified that he or she may decline to receive medical services by telemedicine and may withdraw from such care at any time.    Notes:     :                                               Pulmonary Outpatient  Visit     Subjective:       Patient ID: Rosibel Herrera is a 54 y.o. female.    Social History     Tobacco Use   Smoking Status Never   Smokeless Tobacco Never            Chief Complaint: Sarcoidosis          Rosibel Herrera is 54 y.o.  Asked to see by Marco Alford MD  [id number] [place] [address]   [facility]  Hypercalcemia  Now resolved  Discharge summary reveiwed  Abn PET CT: FDG avid areas 4R,7, 10 R, 10 L  Requested for EBUS sampling    Work at [employer]    Chest: No FDG avid pulmonary nodules/masses. Numerous FDG avid superior mediastinal lymph nodes including the right paratracheal region demonstrating SUV max of up to 4.8.  FDG avid prevascular lymph nodes demonstrate an SUV max of up to 54.8.  Subcarinal lymphadenopathy demonstrate an  SUV max of 4.1.  FDG avid lymph nodes in the right hilar region/suprahilar region demonstrating an SUV max of up to 8.2 and FDG avid left hilar lymph nodes demonstrate a SUV max of up to 4.8.     08/01/2024   Follow-up visit   Biopsy results reviewed  Consistent with noncaseating granuloma support sarcoidosis   IL 2R results trending down   Patient also attests that the skin lesions have improved   No cough no wheezing no shortness a breath   PFTs reviewed consistent with mild reduced diffusion capacity       10/17/2024   Follow-up visit   IL 2R down to 400  Doing  well  No eye issues  Declined pneumo and flu vac  Needs to see Ophthalmology at [place]  Will reducePrednisone tom10 mg  No skin lesions  Spirometry reveiwed  Normal    Component Ref Range & Units 10 d ago 2 mo ago 3 mo ago  Interleukin 2 (IL-2) 175.3 - 858.2 pg/mL 459.7 1722.1 High  CM 1819.2 High  CM    01/16/2025   Follow-up visit   Prednisone for sarcoidosis   Doing well   Prednisone 10 mg every other day  No eye or skin issues  No cough or SOB                   Review of Systems   All other systems reviewed and are negative.      Outpatient Encounter Medications as of 1/16/2025   Medication Sig Dispense Refill    amLODIPine (NORVASC) 10 MG tablet Take 1 tablet (10 mg total) by mouth once daily. 90 tablet 3    azelastine (ASTELIN) 137 mcg (0.1 %) nasal spray 2 sprays by Nasal route 2 (two) times daily.      ergocalciferol (ERGOCALCIFEROL) 50,000 unit Cap Take 1 capsule by mouth once a week 12 capsule 0    fluticasone propionate (FLONASE) 50 mcg/actuation nasal spray by Each Nostril route daily as needed.      losartan (COZAAR) 50 MG tablet Take 1 tablet (50 mg total) by mouth once daily. 90 tablet 3    metFORMIN (GLUCOPHAGE) 500 MG tablet Take 1 tablet (500 mg total) by mouth 2 (two) times daily with meals. 180 tablet 1    montelukast (SINGULAIR) 10 mg tablet Take 10 mg by mouth once daily.      nitrofurantoin, macrocrystal-monohydrate, (MACROBID)  100 MG capsule Take 100 mg by mouth once daily. M/W/F      predniSONE (DELTASONE) 20 MG tablet Take 5 mg by mouth every other day. 10mg      [DISCONTINUED] ergocalciferol (ERGOCALCIFEROL) 50,000 unit Cap Take 1 capsule (50,000 Units total) by mouth every 7 days. 12 capsule 0     No facility-administered encounter medications on file as of 1/16/2025.       The following portions of the patient's history were reviewed and updated as appropriate: She  has a past medical history of Calculus of kidney (4/9/2014 2:20:53 PM), Class 1 obesity due to excess calories with body mass index (BMI) of 30.0 to 30.9 in adult (06/23/2024), Complications affecting other specified body systems, hypertension (3/26/2014 1:38:44 PM), Dyslipidemia (01/05/2023), Excessive or frequent menstruation (3/20/2014 9:22:15 AM), Hypertension, Leiomyoma of uterus (3/20/2014 9:23:21 AM), and Type 2 diabetes mellitus (10/18/2021).  She does not have any pertinent problems on file.  She  has a past surgical history that includes Lithotripsy; Endometrial ablation; Hysterectomy; Bronchoscopy (Bilateral, 7/17/2024); and Endobronchial ultrasound (Bilateral, 7/17/2024).  Her family history includes Diabetes in her father.  She  reports that she has never smoked. She has never used smokeless tobacco. She reports current alcohol use. She reports that she does not use drugs.  She has a current medication list which includes the following prescription(s): amlodipine, azelastine, ergocalciferol, fluticasone propionate, losartan, metformin, montelukast, nitrofurantoin (macrocrystal-monohydrate), and prednisone.  Current Outpatient Medications on File Prior to Visit   Medication Sig Dispense Refill    amLODIPine (NORVASC) 10 MG tablet Take 1 tablet (10 mg total) by mouth once daily. 90 tablet 3    azelastine (ASTELIN) 137 mcg (0.1 %) nasal spray 2 sprays by Nasal route 2 (two) times daily.      ergocalciferol (ERGOCALCIFEROL) 50,000 unit Cap Take 1 capsule by mouth  "once a week 12 capsule 0    fluticasone propionate (FLONASE) 50 mcg/actuation nasal spray by Each Nostril route daily as needed.      losartan (COZAAR) 50 MG tablet Take 1 tablet (50 mg total) by mouth once daily. 90 tablet 3    metFORMIN (GLUCOPHAGE) 500 MG tablet Take 1 tablet (500 mg total) by mouth 2 (two) times daily with meals. 180 tablet 1    montelukast (SINGULAIR) 10 mg tablet Take 10 mg by mouth once daily.      nitrofurantoin, macrocrystal-monohydrate, (MACROBID) 100 MG capsule Take 100 mg by mouth once daily. M/W/F      predniSONE (DELTASONE) 20 MG tablet Take 5 mg by mouth every other day. 10mg       No current facility-administered medications on file prior to visit.     She is allergic to tylox [oxycodone-acetaminophen]..      BP Readings from Last 3 Encounters:   01/16/25 128/82   11/08/24 (!) 160/95   10/17/24 123/80          MMRC Dyspnea Scale (4 is worst)     [] MMRC 0: Dyspneic on strenuous excercise (0 points)    [] MMRC 1: Dyspneic on walking a slight hill (0 points)    [] MMRC 2: Dyspneic on walking level ground; must stop occasionally due to breathlessness (1 point)    [] MMRC 3: Must stop for breathlessness after walking 100 yards or after a few minutes (2 points)    [] MMRC 4: Cannot leave house; breathless on dressing/undressing (3 points)                          No data to display                          Objective:     Vital Signs (Most Recent)  Vital Signs  BP: 128/82  Height and Weight  Height: 5' 3" (160 cm)  Weight: 75.8 kg (167 lb)  BSA (Calculated - sq m): 1.83 sq meters  BMI (Calculated): 29.6  Weight in (lb) to have BMI = 25: 140.8]  Wt Readings from Last 2 Encounters:   01/16/25 75.8 kg (167 lb)   11/08/24 78.7 kg (173 lb 6.3 oz)       Physical Exam  Vitals and nursing note reviewed.   Constitutional:       Appearance: She is normal weight.   HENT:      Head: Normocephalic and atraumatic.      Nose: Nose normal.   Eyes:      Pupils: Pupils are equal, round, and reactive to " "light.   Cardiovascular:      Rate and Rhythm: Normal rate and regular rhythm.      Pulses: Normal pulses.      Heart sounds: Normal heart sounds.   Pulmonary:      Effort: Pulmonary effort is normal.      Breath sounds: Normal breath sounds.   Abdominal:      General: Bowel sounds are normal.      Palpations: Abdomen is soft.   Musculoskeletal:      Cervical back: Normal range of motion.   Skin:     General: Skin is warm.   Neurological:      General: No focal deficit present.      Mental Status: She is alert and oriented to person, place, and time.   Psychiatric:         Mood and Affect: Mood normal.          Laboratory  Lab Results   Component Value Date    WBC 4.43 11/06/2024    RBC 3.91 (L) 11/06/2024    HGB 11.8 (L) 11/06/2024    HCT 35.6 (L) 11/06/2024    MCV 91 11/06/2024    MCH 30.2 11/06/2024    MCHC 33.1 11/06/2024    RDW 13.3 11/06/2024     11/06/2024    MPV 10.0 11/06/2024    GRAN 2.4 11/06/2024    GRAN 53.8 11/06/2024    LYMPH 1.2 11/06/2024    LYMPH 27.5 11/06/2024    MONO 0.6 11/06/2024    MONO 12.4 11/06/2024    EOS 0.2 11/06/2024    BASO 0.04 11/06/2024    EOSINOPHIL 5.2 11/06/2024    BASOPHIL 0.9 11/06/2024       BMP  Lab Results   Component Value Date     12/23/2024    K 3.8 12/23/2024     12/23/2024    CO2 23 12/23/2024    BUN 20 12/23/2024    CREATININE 1.1 12/23/2024    CALCIUM 9.7 12/23/2024    ANIONGAP 12 12/23/2024    ESTGFRAFRICA 60 07/16/2021    EGFRNONAA 52 (A) 07/16/2021    AST 19 11/06/2024    ALT 18 11/06/2024    PROT 9.0 (H) 11/06/2024          No results found for: "IGE"     No results found for: "ASPERGILLUS"  No results found for: "AFUMIGATUSCL"                 Diagnostic Results:  I have personally reviewed today the following studies:    US Retroperitoneal Complete  Narrative: EXAM: US RETROPERITONEAL COMPLETE    CLINICAL HISTORY: [N18.31]-Chronic kidney disease, stage 3a./[I10]-Essential (primary) hypertension.    TECHNIQUE: Grayscale, color, and Doppler " ultrasound evaluation of the kidneys.    COMPARISON: 06/24/2024.    FINDINGS:    The right kidney measures 12.2 cm. Multiple non-obstructing calculi, largest measures up to 5 mm.  No mass, or hydronephrosis.  Resistive index: 0.65    The left kidney measures 13.5 cm. Multiple nonobstructing calculi, largest measures up to 6 mm.  No mass, or hydronephrosis.  Resistive index: 0.66    Limited evaluation of the bladder is unremarkable.  Impression:    Bilateral nonobstructing renal calculi.    Finalized on: 12/23/2024 3:53 PM By:  Juan Oropeza MD  [facility]# [id number]      2024-12-23 15:55:59.036    [facility]     Component      Latest Ref Rng 10/7/2024   Interleukin 2 (IL-2)      175.3 - 858.2 pg/mL 459.7    Vitamin D      30 - 96 ng/mL 13 (L)       Legend:  (L) Low      FEV1 2.19L( 101.6%), FVC 2.55L( 94.5%), FEV1/FVC 86   .   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    This note was prepared using voice recognition system and is likely to have sound alike errors that may have been overlooked even after proof reading.  Please call me with any questions    Discussed diagnosis, its evaluation, treatment and usual course. All questions answered.    Thank you for the courtesy of participating in the care of this patient    Liang Lu MD      Personal Diagnostic Review  []  CXR    []  ECHO    []  ONSAT    []  6MWD    []  LABS    []  CHEST  CT    []  PET CT    []  Biopsy results

## 2025-01-16 ENCOUNTER — OFFICE VISIT (OUTPATIENT)
Dept: SLEEP MEDICINE | Facility: CLINIC | Age: 55
End: 2025-01-16
Payer: COMMERCIAL

## 2025-01-16 VITALS
HEIGHT: 63 IN | WEIGHT: 167 LBS | SYSTOLIC BLOOD PRESSURE: 128 MMHG | DIASTOLIC BLOOD PRESSURE: 82 MMHG | BODY MASS INDEX: 29.59 KG/M2

## 2025-01-16 DIAGNOSIS — R94.8 ABNORMAL PET SCAN OF MEDIASTINUM: ICD-10-CM

## 2025-01-16 DIAGNOSIS — D86.2 SARCOIDOSIS OF LUNG WITH SARCOIDOSIS OF LYMPH NODES: Primary | ICD-10-CM

## 2025-02-10 ENCOUNTER — LAB VISIT (OUTPATIENT)
Dept: LAB | Facility: HOSPITAL | Age: 55
End: 2025-02-10
Payer: COMMERCIAL

## 2025-02-10 DIAGNOSIS — D86.2 SARCOIDOSIS OF LUNG WITH SARCOIDOSIS OF LYMPH NODES: ICD-10-CM

## 2025-02-10 DIAGNOSIS — L93.0 DISCOID LUPUS: ICD-10-CM

## 2025-02-10 DIAGNOSIS — D47.2 MGUS (MONOCLONAL GAMMOPATHY OF UNKNOWN SIGNIFICANCE): ICD-10-CM

## 2025-02-10 LAB
ALBUMIN SERPL BCP-MCNC: 3.5 G/DL (ref 3.5–5.2)
ALP SERPL-CCNC: 93 U/L (ref 40–150)
ALT SERPL W/O P-5'-P-CCNC: 25 U/L (ref 10–44)
ANION GAP SERPL CALC-SCNC: 9 MMOL/L (ref 8–16)
AST SERPL-CCNC: 23 U/L (ref 10–40)
B2 MICROGLOB SERPL-MCNC: 4.8 UG/ML (ref 0–2.5)
BASOPHILS # BLD AUTO: 0.03 K/UL (ref 0–0.2)
BASOPHILS NFR BLD: 0.5 % (ref 0–1.9)
BILIRUB SERPL-MCNC: 0.4 MG/DL (ref 0.1–1)
BUN SERPL-MCNC: 19 MG/DL (ref 6–20)
CALCIUM SERPL-MCNC: 10 MG/DL (ref 8.7–10.5)
CHLORIDE SERPL-SCNC: 103 MMOL/L (ref 95–110)
CO2 SERPL-SCNC: 26 MMOL/L (ref 23–29)
CREAT SERPL-MCNC: 1.2 MG/DL (ref 0.5–1.4)
DIFFERENTIAL METHOD BLD: NORMAL
EOSINOPHIL # BLD AUTO: 0.1 K/UL (ref 0–0.5)
EOSINOPHIL NFR BLD: 1.6 % (ref 0–8)
ERYTHROCYTE [DISTWIDTH] IN BLOOD BY AUTOMATED COUNT: 12.9 % (ref 11.5–14.5)
EST. GFR  (NO RACE VARIABLE): 53 ML/MIN/1.73 M^2
GLUCOSE SERPL-MCNC: 114 MG/DL (ref 70–110)
HCT VFR BLD AUTO: 39.7 % (ref 37–48.5)
HGB BLD-MCNC: 12.7 G/DL (ref 12–16)
IGA SERPL-MCNC: 595 MG/DL (ref 40–350)
IGG SERPL-MCNC: 4441 MG/DL (ref 650–1600)
IGM SERPL-MCNC: 120 MG/DL (ref 50–300)
IMM GRANULOCYTES # BLD AUTO: 0.03 K/UL (ref 0–0.04)
IMM GRANULOCYTES NFR BLD AUTO: 0.5 % (ref 0–0.5)
LYMPHOCYTES # BLD AUTO: 1.3 K/UL (ref 1–4.8)
LYMPHOCYTES NFR BLD: 20.6 % (ref 18–48)
MCH RBC QN AUTO: 29 PG (ref 27–31)
MCHC RBC AUTO-ENTMCNC: 32 G/DL (ref 32–36)
MCV RBC AUTO: 91 FL (ref 82–98)
MONOCYTES # BLD AUTO: 0.5 K/UL (ref 0.3–1)
MONOCYTES NFR BLD: 8.3 % (ref 4–15)
NEUTROPHILS # BLD AUTO: 4.4 K/UL (ref 1.8–7.7)
NEUTROPHILS NFR BLD: 68.5 % (ref 38–73)
NRBC BLD-RTO: 0 /100 WBC
PLATELET # BLD AUTO: 356 K/UL (ref 150–450)
PMV BLD AUTO: 10.3 FL (ref 9.2–12.9)
POTASSIUM SERPL-SCNC: 3.7 MMOL/L (ref 3.5–5.1)
PROT SERPL-MCNC: 10.8 G/DL (ref 6–8.4)
RBC # BLD AUTO: 4.38 M/UL (ref 4–5.4)
SODIUM SERPL-SCNC: 138 MMOL/L (ref 136–145)
WBC # BLD AUTO: 6.37 K/UL (ref 3.9–12.7)

## 2025-02-10 PROCEDURE — 86334 IMMUNOFIX E-PHORESIS SERUM: CPT | Mod: 26,,, | Performed by: PATHOLOGY

## 2025-02-10 PROCEDURE — 36415 COLL VENOUS BLD VENIPUNCTURE: CPT

## 2025-02-10 PROCEDURE — 84165 PROTEIN E-PHORESIS SERUM: CPT | Mod: 26,,, | Performed by: PATHOLOGY

## 2025-02-10 PROCEDURE — 86334 IMMUNOFIX E-PHORESIS SERUM: CPT

## 2025-02-10 PROCEDURE — 84165 PROTEIN E-PHORESIS SERUM: CPT

## 2025-02-10 PROCEDURE — 85025 COMPLETE CBC W/AUTO DIFF WBC: CPT

## 2025-02-10 PROCEDURE — 80053 COMPREHEN METABOLIC PANEL: CPT

## 2025-02-10 PROCEDURE — 82232 ASSAY OF BETA-2 PROTEIN: CPT

## 2025-02-10 PROCEDURE — 83521 IG LIGHT CHAINS FREE EACH: CPT

## 2025-02-10 PROCEDURE — 82784 ASSAY IGA/IGD/IGG/IGM EACH: CPT

## 2025-02-11 LAB
ALBUMIN SERPL ELPH-MCNC: 4.12 G/DL (ref 3.35–5.55)
ALPHA1 GLOB SERPL ELPH-MCNC: 0.39 G/DL (ref 0.17–0.41)
ALPHA2 GLOB SERPL ELPH-MCNC: 0.94 G/DL (ref 0.43–0.99)
B-GLOBULIN SERPL ELPH-MCNC: 1.2 G/DL (ref 0.5–1.1)
GAMMA GLOB SERPL ELPH-MCNC: 4.06 G/DL (ref 0.67–1.58)
INTERPRETATION SERPL IFE-IMP: NORMAL
KAPPA LC SER QL IA: 45.99 MG/DL (ref 0.33–1.94)
KAPPA LC/LAMBDA SER IA: 13.94 (ref 0.26–1.65)
LAMBDA LC SER QL IA: 3.3 MG/DL (ref 0.57–2.63)
PROT SERPL-MCNC: 10.7 G/DL (ref 6–8.4)

## 2025-02-12 LAB
PATHOLOGIST INTERPRETATION IFE: NORMAL
PATHOLOGIST INTERPRETATION SPE: NORMAL

## 2025-02-17 ENCOUNTER — OFFICE VISIT (OUTPATIENT)
Dept: HEMATOLOGY/ONCOLOGY | Facility: CLINIC | Age: 55
End: 2025-02-17
Payer: COMMERCIAL

## 2025-02-17 DIAGNOSIS — D47.2 MGUS (MONOCLONAL GAMMOPATHY OF UNKNOWN SIGNIFICANCE): Primary | ICD-10-CM

## 2025-02-17 DIAGNOSIS — R94.8 ABNORMAL PET SCAN OF MEDIASTINUM: ICD-10-CM

## 2025-02-17 DIAGNOSIS — D86.2 SARCOIDOSIS OF LUNG WITH SARCOIDOSIS OF LYMPH NODES: ICD-10-CM

## 2025-02-17 PROCEDURE — 1160F RVW MEDS BY RX/DR IN RCRD: CPT | Mod: CPTII,95,, | Performed by: INTERNAL MEDICINE

## 2025-02-17 PROCEDURE — 1159F MED LIST DOCD IN RCRD: CPT | Mod: CPTII,95,, | Performed by: INTERNAL MEDICINE

## 2025-02-17 NOTE — PROGRESS NOTES
Subjective:       Patient ID: Rosibel Herrera is a 55 y.o. female.    Chief Complaint: Results    HPI:  55-year-old female newly diagnosed with sarcoidosis.  Patient has abnormal protein in blood.  In being followed by monoclonal gammopathy of undetermined significance variant being treated with prednisone 5 mg for sarcoidosis biopsy-proven ECOG status 1 seen in virtual visit    Past Medical History:   Diagnosis Date    Calculus of kidney 4/9/2014 2:20:53 PM    [facility] Historical - Urology: KidneyStones-No Additional Notes    Class 1 obesity due to excess calories with body mass index (BMI) of 30.0 to 30.9 in adult 06/23/2024    Complications affecting other specified body systems, hypertension 3/26/2014 1:38:44 PM    [facility] Historical - Cardiovascular: Hypertension-No Additional Notes    Dyslipidemia 01/05/2023    Excessive or frequent menstruation 3/20/2014 9:22:15 AM    [facility] Historical - [facility]: Bleeding, Menorrhagia-No Additional Notes    Hypertension     Leiomyoma of uterus 3/20/2014 9:23:21 AM    [facility] Historical - [facility]: Fibroids/Leiomyoma of Uterus-2.1x1.8cm (POST/LT); 3.9x3.5cm (ANT TO ENDO)    Type 2 diabetes mellitus 10/18/2021     Family History   Problem Relation Name Age of Onset    Diabetes Father       Social History[1]  Past Surgical History:   Procedure Laterality Date    BRONCHOSCOPY Bilateral 7/17/2024    Procedure: Bronchoscopy - insert lighted tube into airway to take a biopsy of lung;  Surgeon: Liang Lu MD;  Location: [facility];  Service: Pulmonary;  Laterality: Bilateral;    ENDOBRONCHIAL ULTRASOUND Bilateral 7/17/2024    Procedure: ENDOBRONCHIAL ULTRASOUND (EBUS);  Surgeon: Liang Lu MD;  Location: [facility];  Service: Pulmonary;  Laterality: Bilateral;    ENDOMETRIAL ABLATION      HYSTERECTOMY      fibroids    LITHOTRIPSY         Labs:  Lab Results   Component Value Date    WBC 6.37 02/10/2025    HGB 12.7 02/10/2025    HCT 39.7 02/10/2025     "MCV 91 02/10/2025     02/10/2025     BMP  Lab Results   Component Value Date     02/10/2025    K 3.7 02/10/2025     02/10/2025    CO2 26 02/10/2025    BUN 19 02/10/2025    CREATININE 1.2 02/10/2025    CALCIUM 10.0 02/10/2025    ANIONGAP 9 02/10/2025    ESTGFRAFRICA 60 07/16/2021    EGFRNONAA 52 (A) 07/16/2021     Lab Results   Component Value Date    ALT 25 02/10/2025    AST 23 02/10/2025    ALKPHOS 93 02/10/2025    BILITOT 0.4 02/10/2025       No results found for: "IRON", "TIBC", "FERRITIN", "SATURATEDIRO"  No results found for: "[id number]"  No results found for: "FOLATE"  Lab Results   Component Value Date    TSH 2.015 06/03/2024         Review of Systems   Constitutional:  Negative for activity change, appetite change, chills, diaphoresis, fatigue, fever and unexpected weight change.   HENT:  Negative for congestion, dental problem, drooling, ear discharge, ear pain, facial swelling, hearing loss, mouth sores, nosebleeds, postnasal drip, rhinorrhea, sinus pressure, sneezing, sore throat, tinnitus, trouble swallowing and voice change.    Eyes:  Negative for photophobia, pain, discharge, redness, itching and visual disturbance.   Respiratory:  Negative for cough, choking, chest tightness, shortness of breath, wheezing and stridor.    Cardiovascular:  Negative for chest pain, palpitations and leg swelling.   Gastrointestinal:  Negative for abdominal distention, abdominal pain, anal bleeding, blood in stool, constipation, diarrhea, nausea, rectal pain and vomiting.   Endocrine: Negative for cold intolerance, heat intolerance, polydipsia, polyphagia and polyuria.   Genitourinary:  Negative for decreased urine volume, difficulty urinating, dyspareunia, dysuria, enuresis, flank pain, frequency, genital sores, hematuria, menstrual problem, pelvic pain, urgency, vaginal bleeding, vaginal discharge and vaginal pain.   Musculoskeletal:  Negative for arthralgias, back pain, gait problem, joint swelling, " myalgias, neck pain and neck stiffness.   Skin:  Negative for color change, pallor and rash.   Allergic/Immunologic: Negative for environmental allergies, food allergies and immunocompromised state.   Neurological:  Negative for dizziness, tremors, seizures, syncope, facial asymmetry, speech difficulty, weakness, light-headedness, numbness and headaches.   Hematological:  Negative for adenopathy. Does not bruise/bleed easily.   Psychiatric/Behavioral:  Negative for agitation, behavioral problems, confusion, decreased concentration, dysphoric mood, hallucinations, self-injury, sleep disturbance and suicidal ideas. The patient is not nervous/anxious and is not hyperactive.        Objective:      Physical Exam  Constitutional:       Appearance: Normal appearance.   Neurological:      Mental Status: She is alert.             Assessment:      1. MGUS (monoclonal gammopathy of unknown significance)    2. Sarcoidosis of lung with sarcoidosis of lymph nodes    3. Abnormal PET scan of mediastinum           Med Onc Chart Routing      Follow up with physician . Return to clinic 3-4 months with CBC CMP protein electrophoresis CMP SPEP free light chain beta 2 microglobulin C-reactive protein prior   Follow up with [facility]    Infusion scheduling note    Injection scheduling note    Labs    Imaging    Pharmacy appointment    Other referrals                   Plan:     The patient location is:  Home  The chief complaint leading to consultation is:  MGUS results    Visit type: audiovisual    Face to Face time with patient: 25 minutes of total time spent on the encounter, which includes face to face time and non-face to face time preparing to see the patient (eg, review of tests), Obtaining and/or reviewing separately obtained history, Documenting clinical information in the electronic or other health record, Independently interpreting results (not separately reported) and communicating results to the patient/family/caregiver, or Care  coordination (not separately reported).         Each patient to whom he or she provides medical services by telemedicine is:  (1) informed of the relationship between the physician and patient and the respective role of any other health care provider with respect to management of the patient; and (2) notified that he or she may decline to receive medical services by telemedicine and may withdraw from such care at any time.    Notes:   Reviewed increase in MGUS with free light chain ratio nearly doubling in 8 months as well as increasing MGUS.  Current dose of prednisone 5 mg per day.  No pain at this time would follow-up 3-4 months if progression would probably recommend repeat bone marrow previous bone marrow 2024 demonstrating no evidence of myelomatous involvement.  But clear sarcoidosis noted on biopsy discussed above articles from up-to-date followed her for review on monoclonal gammopathy of undetermined significance      Marco Alford Jr, MD FACP         [1]   Social History  Socioeconomic History    Marital status:    Tobacco Use    Smoking status: Never    Smokeless tobacco: Never   Substance and Sexual Activity    Alcohol use: Yes     Comment: rarely    Drug use: No    Sexual activity: Yes     Partners: Male     Birth control/protection: Post-menopausal     Social Drivers of Health     Financial Resource Strain: Low Risk  (2/17/2025)    Overall Financial Resource Strain (CARDIA)     Difficulty of Paying Living Expenses: Not hard at all   Food Insecurity: No Food Insecurity (2/17/2025)    Hunger Vital Sign     Worried About Running Out of Food in the Last Year: Never true     Ran Out of Food in the Last Year: Never true   Transportation Needs: No Transportation Needs (2/17/2025)    PRAPARE - Transportation     Lack of Transportation (Medical): No     Lack of Transportation (Non-Medical): No   Physical Activity: Insufficiently Active (2/17/2025)    Exercise Vital Sign     Days of Exercise per Week: 2  days     Minutes of Exercise per Session: 60 min   Stress: No Stress Concern Present (2/17/2025)    [employer] - Occupational Stress Questionnaire     Feeling of Stress : Not at all   Housing Stability: Low Risk  (2/17/2025)    Housing Stability Vital Sign     Unable to Pay for Housing in the Last Year: No     Number of Times Moved in the Last Year: 0     Homeless in the Last Year: No

## 2025-05-05 ENCOUNTER — LAB VISIT (OUTPATIENT)
Dept: LAB | Facility: HOSPITAL | Age: 55
End: 2025-05-05
Attending: INTERNAL MEDICINE
Payer: COMMERCIAL

## 2025-05-05 DIAGNOSIS — R94.8 ABNORMAL PET SCAN OF MEDIASTINUM: ICD-10-CM

## 2025-05-05 DIAGNOSIS — D47.2 MGUS (MONOCLONAL GAMMOPATHY OF UNKNOWN SIGNIFICANCE): ICD-10-CM

## 2025-05-05 DIAGNOSIS — D86.2 SARCOIDOSIS OF LUNG WITH SARCOIDOSIS OF LYMPH NODES: ICD-10-CM

## 2025-05-05 LAB
ABSOLUTE EOSINOPHIL (OHS): 0.3 K/UL
ABSOLUTE MONOCYTE (OHS): 0.44 K/UL (ref 0.3–1)
ABSOLUTE NEUTROPHIL COUNT (OHS): 3.22 K/UL (ref 1.8–7.7)
ALBUMIN SERPL BCP-MCNC: 3.3 G/DL (ref 3.5–5.2)
ALP SERPL-CCNC: 67 UNIT/L (ref 40–150)
ALT SERPL W/O P-5'-P-CCNC: 14 UNIT/L (ref 10–44)
ANION GAP (OHS): 14 MMOL/L (ref 8–16)
AST SERPL-CCNC: 17 UNIT/L (ref 11–45)
BASOPHILS # BLD AUTO: 0.05 K/UL
BASOPHILS NFR BLD AUTO: 0.9 %
BETA 2 MICROGLOBILIN (OHS): 3.7 UG/ML (ref 0–2.5)
BILIRUB SERPL-MCNC: 0.5 MG/DL (ref 0.1–1)
BUN SERPL-MCNC: 15 MG/DL (ref 6–20)
CALCIUM SERPL-MCNC: 9.4 MG/DL (ref 8.7–10.5)
CHLORIDE SERPL-SCNC: 103 MMOL/L (ref 95–110)
CO2 SERPL-SCNC: 21 MMOL/L (ref 23–29)
CREAT SERPL-MCNC: 1.1 MG/DL (ref 0.5–1.4)
CRP SERPL-MCNC: 11.7 MG/L
ERYTHROCYTE [DISTWIDTH] IN BLOOD BY AUTOMATED COUNT: 13.2 % (ref 11.5–14.5)
GFR SERPLBLD CREATININE-BSD FMLA CKD-EPI: 59 ML/MIN/1.73/M2
GLUCOSE SERPL-MCNC: 201 MG/DL (ref 70–110)
HCT VFR BLD AUTO: 38.5 % (ref 37–48.5)
HGB BLD-MCNC: 12.8 GM/DL (ref 12–16)
IMM GRANULOCYTES # BLD AUTO: 0.01 K/UL (ref 0–0.04)
IMM GRANULOCYTES NFR BLD AUTO: 0.2 % (ref 0–0.5)
LYMPHOCYTES # BLD AUTO: 1.4 K/UL (ref 1–4.8)
MCH RBC QN AUTO: 30 PG (ref 27–31)
MCHC RBC AUTO-ENTMCNC: 33.2 G/DL (ref 32–36)
MCV RBC AUTO: 90 FL (ref 82–98)
NUCLEATED RBC (/100WBC) (OHS): 0 /100 WBC
PLATELET # BLD AUTO: 326 K/UL (ref 150–450)
PMV BLD AUTO: 10 FL (ref 9.2–12.9)
POTASSIUM SERPL-SCNC: 3.6 MMOL/L (ref 3.5–5.1)
PROT SERPL-MCNC: 10.3 GM/DL (ref 6–8.4)
RBC # BLD AUTO: 4.26 M/UL (ref 4–5.4)
RELATIVE EOSINOPHIL (OHS): 5.5 %
RELATIVE LYMPHOCYTE (OHS): 25.8 % (ref 18–48)
RELATIVE MONOCYTE (OHS): 8.1 % (ref 4–15)
RELATIVE NEUTROPHIL (OHS): 59.5 % (ref 38–73)
SODIUM SERPL-SCNC: 138 MMOL/L (ref 136–145)
WBC # BLD AUTO: 5.42 K/UL (ref 3.9–12.7)

## 2025-05-05 PROCEDURE — 84165 PROTEIN E-PHORESIS SERUM: CPT

## 2025-05-05 PROCEDURE — 82232 ASSAY OF BETA-2 PROTEIN: CPT

## 2025-05-05 PROCEDURE — 83521 IG LIGHT CHAINS FREE EACH: CPT

## 2025-05-05 PROCEDURE — 36415 COLL VENOUS BLD VENIPUNCTURE: CPT

## 2025-05-05 PROCEDURE — 84075 ASSAY ALKALINE PHOSPHATASE: CPT

## 2025-05-05 PROCEDURE — 85025 COMPLETE CBC W/AUTO DIFF WBC: CPT

## 2025-05-05 PROCEDURE — 86140 C-REACTIVE PROTEIN: CPT

## 2025-05-06 ENCOUNTER — RESULTS FOLLOW-UP (OUTPATIENT)
Dept: HEMATOLOGY/ONCOLOGY | Facility: CLINIC | Age: 55
End: 2025-05-06

## 2025-05-06 LAB
ALBUMIN, SPE (OHS): 3.65 G/DL (ref 3.35–5.55)
ALPHA 1 GLOB (OHS): 0.32 GM/DL (ref 0.17–0.41)
ALPHA 2 GLOB (OHS): 0.78 GM/DL (ref 0.43–0.99)
BETA GLOB (OHS): 1.01 GM/DL (ref 0.5–1.1)
GAMMA GLOBULIN (OHS): 3.25 GM/DL (ref 0.67–1.58)
KAPPA LC FREE SER-MCNC: 12.06 MG/L (ref 0.26–1.65)
KAPPA LC FREE/LAMBDA FREE SER: 39.68 MG/DL (ref 0.33–1.94)
LAMBDA LC FREE SERPL-MCNC: 3.29 MG/DL (ref 0.57–2.63)
PROT SERPL-MCNC: 9 GM/DL (ref 6–8.4)

## 2025-05-08 LAB — PATHOLOGIST REVIEW - SPE (OHS): NORMAL

## 2025-05-13 ENCOUNTER — TELEPHONE (OUTPATIENT)
Dept: HEMATOLOGY/ONCOLOGY | Facility: CLINIC | Age: 55
End: 2025-05-13
Payer: COMMERCIAL

## 2025-05-13 NOTE — TELEPHONE ENCOUNTER
----- Message from Rupinder Caceres sent at 5/13/2025  4:20 PM CDT -----  Contact: Rosibel  .Type: Patient  Requesting Call BackWho Called: Kassy is this regarding?: AppointmentWould the patient rather a call back or a response via [employer]?  Call Shala Call Back Number: [phone] Information:  Patient call in regard to having appointment changed to a virtual. Please Call Back

## 2025-05-13 NOTE — TELEPHONE ENCOUNTER
Returned call to pt. Pt is asking if there's availability for an earlier appt time. Informed pt there are no earlier time avail at this time but if there's a cancellation nurse would notify pt. Offered to change appt to vv. Pt accepted. Appt changed to vv. Verbalized understanding. Call ended well.

## 2025-05-15 ENCOUNTER — OFFICE VISIT (OUTPATIENT)
Dept: HEMATOLOGY/ONCOLOGY | Facility: CLINIC | Age: 55
End: 2025-05-15
Payer: COMMERCIAL

## 2025-05-15 DIAGNOSIS — D47.2 MGUS (MONOCLONAL GAMMOPATHY OF UNKNOWN SIGNIFICANCE): Primary | ICD-10-CM

## 2025-05-15 PROCEDURE — 1159F MED LIST DOCD IN RCRD: CPT | Mod: CPTII,95,, | Performed by: INTERNAL MEDICINE

## 2025-05-15 PROCEDURE — 98005 SYNCH AUDIO-VIDEO EST LOW 20: CPT | Mod: 95,,, | Performed by: INTERNAL MEDICINE

## 2025-05-15 PROCEDURE — 1160F RVW MEDS BY RX/DR IN RCRD: CPT | Mod: CPTII,95,, | Performed by: INTERNAL MEDICINE

## 2025-05-15 NOTE — PROGRESS NOTES
Subjective:       Patient ID: Rosibel eHrrera is a 55 y.o. female.    Chief Complaint: Results    HPI:  55-year-old female history of monoclonal gammopathy of undetermined significance patient returns for clinical follow-up patient denies any bone pain seen in virtual visit    Past Medical History:   Diagnosis Date    Calculus of kidney 4/9/2014 2:20:53 PM    [facility] Historical - Urology: KidneyStones-No Additional Notes    Class 1 obesity due to excess calories with body mass index (BMI) of 30.0 to 30.9 in adult 06/23/2024    Complications affecting other specified body systems, hypertension 3/26/2014 1:38:44 PM    [facility] Historical - Cardiovascular: Hypertension-No Additional Notes    Dyslipidemia 01/05/2023    Excessive or frequent menstruation 3/20/2014 9:22:15 AM    [facility]: Bleeding, Menorrhagia-No Additional Notes    Hypertension     Leiomyoma of uterus 3/20/2014 9:23:21 AM    [facility]: Fibroids/Leiomyoma of Uterus-2.1x1.8cm (POST/LT); 3.9x3.5cm (ANT TO ENDO)    Type 2 diabetes mellitus 10/18/2021     Family History   Problem Relation Name Age of Onset    Diabetes Father       Social History[1]  Past Surgical History:   Procedure Laterality Date    BRONCHOSCOPY Bilateral 7/17/2024    Procedure: Bronchoscopy - insert lighted tube into airway to take a biopsy of lung;  Surgeon: Liang Lu MD;  Location: [facility];  Service: Pulmonary;  Laterality: Bilateral;    ENDOBRONCHIAL ULTRASOUND Bilateral 7/17/2024    Procedure: ENDOBRONCHIAL ULTRASOUND (EBUS);  Surgeon: Liang Lu MD;  Location: [facility];  Service: Pulmonary;  Laterality: Bilateral;    ENDOMETRIAL ABLATION      HYSTERECTOMY      fibroids    LITHOTRIPSY         Labs:  Lab Results   Component Value Date    WBC 5.42 05/05/2025    HGB 12.8 05/05/2025    HCT 38.5 05/05/2025    MCV 90 05/05/2025     05/05/2025     BMP  Lab Results   Component Value Date    NA  "138 05/05/2025    K 3.6 05/05/2025     05/05/2025    CO2 21 (L) 05/05/2025    BUN 15 05/05/2025    CREATININE 1.1 05/05/2025    CALCIUM 9.4 05/05/2025    ANIONGAP 14 05/05/2025    ESTGFRAFRICA 60 07/16/2021    EGFRNONAA 52 (A) 07/16/2021     Lab Results   Component Value Date    ALT 14 05/05/2025    AST 17 05/05/2025    ALKPHOS 67 05/05/2025    BILITOT 0.5 05/05/2025       No results found for: "IRON", "TIBC", "FERRITIN", "SATURATEDIRO"  No results found for: "UHYSHHCA30"  No results found for: "FOLATE"  Lab Results   Component Value Date    TSH 2.015 06/03/2024         Review of Systems   Constitutional:  Negative for activity change, appetite change, chills, diaphoresis, fatigue, fever and unexpected weight change.   HENT:  Negative for congestion, dental problem, drooling, ear discharge, ear pain, facial swelling, hearing loss, mouth sores, nosebleeds, postnasal drip, rhinorrhea, sinus pressure, sneezing, sore throat, tinnitus, trouble swallowing and voice change.    Eyes:  Negative for photophobia, pain, discharge, redness, itching and visual disturbance.   Respiratory:  Negative for cough, choking, chest tightness, shortness of breath, wheezing and stridor.    Cardiovascular:  Negative for chest pain, palpitations and leg swelling.   Gastrointestinal:  Negative for abdominal distention, abdominal pain, anal bleeding, blood in stool, constipation, diarrhea, nausea, rectal pain and vomiting.   Endocrine: Negative for cold intolerance, heat intolerance, polydipsia, polyphagia and polyuria.   Genitourinary:  Negative for decreased urine volume, difficulty urinating, dyspareunia, dysuria, enuresis, flank pain, frequency, genital sores, hematuria, menstrual problem, pelvic pain, urgency, vaginal bleeding, vaginal discharge and vaginal pain.   Musculoskeletal:  Negative for arthralgias, back pain, gait problem, joint swelling, myalgias, neck pain and neck stiffness.   Skin:  Negative for color change, pallor and " rash.   Allergic/Immunologic: Negative for environmental allergies, food allergies and immunocompromised state.   Neurological:  Negative for dizziness, tremors, seizures, syncope, facial asymmetry, speech difficulty, weakness, light-headedness, numbness and headaches.   Hematological:  Negative for adenopathy. Does not bruise/bleed easily.   Psychiatric/Behavioral:  Negative for agitation, behavioral problems, confusion, decreased concentration, dysphoric mood, hallucinations, self-injury, sleep disturbance and suicidal ideas. The patient is not nervous/anxious and is not hyperactive.        Objective:      Physical Exam  Constitutional:       Appearance: Normal appearance. She is normal weight.           Assessment:      1. MGUS (monoclonal gammopathy of unknown significance)           Med Onc Chart Routing      Follow up with physician . Return 6 months with CBC beta 2 microglobulin serum protein electrophoresis CMP C-reactive protein LDH serum free light chain in 24 hour urine prior 5-7 days   Follow up with [facility]    Infusion scheduling note    Injection scheduling note    Labs    Imaging    Pharmacy appointment    Other referrals                 Plan:     The patient location is:  Home  The chief complaint leading to consultation is:  Results    Visit type: audiovisual    Face to Face time with patient: 20 minutes of total time spent on the encounter, which includes face to face time and non-face to face time preparing to see the patient (eg, review of tests), Obtaining and/or reviewing separately obtained history, Documenting clinical information in the electronic or other health record, Independently interpreting results (not separately reported) and communicating results to the patient/family/caregiver, or Care coordination (not separately reported).         Each patient to whom he or she provides medical services by telemedicine is:  (1) informed of the relationship between the physician and patient and the  respective role of any other health care provider with respect to management of the patient; and (2) notified that he or she may decline to receive medical services by telemedicine and may withdraw from such care at any time.    Notes:  Reviewed information.  I would like to proceed with laboratory studies in 6 months no real clear evidence of progression.  At this time continue Barbara clinical follow-up.  Will performed 24 hour urine in six-month follow-up discussed implications answered questions        Marco Alford Jr, MD FACP           [1]  Social History  Socioeconomic History    Marital status:    Tobacco Use    Smoking status: Never    Smokeless tobacco: Never   Substance and Sexual Activity    Alcohol use: Yes     Comment: rarely    Drug use: No    Sexual activity: Yes     Partners: Male     Birth control/protection: Post-menopausal     Social Drivers of Health     Financial Resource Strain: Low Risk  (2/17/2025)    Overall Financial Resource Strain (CARDIA)     Difficulty of Paying Living Expenses: Not hard at all   Food Insecurity: No Food Insecurity (2/17/2025)    Hunger Vital Sign     Worried About Running Out of Food in the Last Year: Never true     Ran Out of Food in the Last Year: Never true   Transportation Needs: No Transportation Needs (2/17/2025)    PRAPARE - Transportation     Lack of Transportation (Medical): No     Lack of Transportation (Non-Medical): No   Physical Activity: Insufficiently Active (2/17/2025)    Exercise Vital Sign     Days of Exercise per Week: 2 days     Minutes of Exercise per Session: 60 min   Stress: No Stress Concern Present (2/17/2025)    [employer] - Occupational Stress Questionnaire     Feeling of Stress : Not at all   Housing Stability: Low Risk  (2/17/2025)    Housing Stability Vital Sign     Unable to Pay for Housing in the Last Year: No     Number of Times Moved in the Last Year: 0     Homeless in the Last  Year: No

## 2025-06-11 DIAGNOSIS — E11.9 TYPE 2 DIABETES MELLITUS WITHOUT COMPLICATION: ICD-10-CM

## 2025-06-12 ENCOUNTER — PATIENT MESSAGE (OUTPATIENT)
Dept: NEPHROLOGY | Facility: CLINIC | Age: 55
End: 2025-06-12
Payer: COMMERCIAL

## 2025-06-16 ENCOUNTER — LAB VISIT (OUTPATIENT)
Dept: LAB | Facility: HOSPITAL | Age: 55
End: 2025-06-16
Attending: INTERNAL MEDICINE
Payer: COMMERCIAL

## 2025-06-16 ENCOUNTER — OFFICE VISIT (OUTPATIENT)
Dept: FAMILY MEDICINE | Facility: CLINIC | Age: 55
End: 2025-06-16
Payer: COMMERCIAL

## 2025-06-16 VITALS
OXYGEN SATURATION: 98 % | SYSTOLIC BLOOD PRESSURE: 110 MMHG | HEART RATE: 74 BPM | DIASTOLIC BLOOD PRESSURE: 70 MMHG | TEMPERATURE: 98 F | BODY MASS INDEX: 33.35 KG/M2 | WEIGHT: 188.25 LBS

## 2025-06-16 DIAGNOSIS — I10 PRIMARY HYPERTENSION: ICD-10-CM

## 2025-06-16 DIAGNOSIS — E78.5 DYSLIPIDEMIA: ICD-10-CM

## 2025-06-16 DIAGNOSIS — N18.31 STAGE 3A CHRONIC KIDNEY DISEASE: ICD-10-CM

## 2025-06-16 DIAGNOSIS — E11.65 TYPE 2 DIABETES MELLITUS WITH HYPERGLYCEMIA, WITHOUT LONG-TERM CURRENT USE OF INSULIN: ICD-10-CM

## 2025-06-16 DIAGNOSIS — E83.52 HYPERCALCEMIA: ICD-10-CM

## 2025-06-16 DIAGNOSIS — R80.1 PERSISTENT PROTEINURIA: ICD-10-CM

## 2025-06-16 DIAGNOSIS — E55.9 VITAMIN D DEFICIENCY: ICD-10-CM

## 2025-06-16 DIAGNOSIS — E11.9 TYPE 2 DIABETES MELLITUS WITHOUT COMPLICATION: ICD-10-CM

## 2025-06-16 DIAGNOSIS — R30.0 DYSURIA: Primary | ICD-10-CM

## 2025-06-16 LAB
25(OH)D3+25(OH)D2 SERPL-MCNC: 21 NG/ML (ref 30–96)
ABSOLUTE EOSINOPHIL (OHS): 0.23 K/UL
ABSOLUTE MONOCYTE (OHS): 0.56 K/UL (ref 0.3–1)
ABSOLUTE NEUTROPHIL COUNT (OHS): 3.25 K/UL (ref 1.8–7.7)
ALBUMIN SERPL BCP-MCNC: 3.4 G/DL (ref 3.5–5.2)
ALP SERPL-CCNC: 63 UNIT/L (ref 40–150)
ALT SERPL W/O P-5'-P-CCNC: 13 UNIT/L (ref 10–44)
ANION GAP (OHS): 11 MMOL/L (ref 8–16)
AST SERPL-CCNC: 18 UNIT/L (ref 11–45)
BACTERIA #/AREA URNS AUTO: ABNORMAL /HPF
BACTERIA #/AREA URNS AUTO: ABNORMAL /HPF
BASOPHILS # BLD AUTO: 0.03 K/UL
BASOPHILS NFR BLD AUTO: 0.5 %
BILIRUB SERPL-MCNC: 0.4 MG/DL (ref 0.1–1)
BILIRUB UR QL STRIP.AUTO: NEGATIVE
BILIRUB UR QL STRIP.AUTO: NEGATIVE
BUN SERPL-MCNC: 15 MG/DL (ref 6–20)
CALCIUM SERPL-MCNC: 8.9 MG/DL (ref 8.7–10.5)
CHLORIDE SERPL-SCNC: 105 MMOL/L (ref 95–110)
CHOLEST SERPL-MCNC: 245 MG/DL (ref 120–199)
CHOLEST/HDLC SERPL: 4.8 {RATIO} (ref 2–5)
CLARITY UR: CLEAR
CLARITY UR: CLEAR
CO2 SERPL-SCNC: 22 MMOL/L (ref 23–29)
COLOR UR AUTO: YELLOW
COLOR UR AUTO: YELLOW
CREAT SERPL-MCNC: 0.9 MG/DL (ref 0.5–1.4)
CREAT UR-MCNC: 79 MG/DL (ref 15–325)
EAG (OHS): 140 MG/DL (ref 68–131)
ERYTHROCYTE [DISTWIDTH] IN BLOOD BY AUTOMATED COUNT: 13.3 % (ref 11.5–14.5)
GFR SERPLBLD CREATININE-BSD FMLA CKD-EPI: >60 ML/MIN/1.73/M2
GLUCOSE SERPL-MCNC: 83 MG/DL (ref 70–110)
GLUCOSE UR QL STRIP: NEGATIVE
GLUCOSE UR QL STRIP: NEGATIVE
HBA1C MFR BLD: 6.5 % (ref 4–5.6)
HCT VFR BLD AUTO: 38.3 % (ref 37–48.5)
HDLC SERPL-MCNC: 51 MG/DL (ref 40–75)
HDLC SERPL: 20.8 % (ref 20–50)
HGB BLD-MCNC: 12.1 GM/DL (ref 12–16)
HGB UR QL STRIP: NEGATIVE
HGB UR QL STRIP: NEGATIVE
HYALINE CASTS UR QL AUTO: 0 /LPF (ref 0–1)
HYALINE CASTS UR QL AUTO: 3 /LPF (ref 0–1)
IMM GRANULOCYTES # BLD AUTO: 0 K/UL (ref 0–0.04)
IMM GRANULOCYTES NFR BLD AUTO: 0 % (ref 0–0.5)
KETONES UR QL STRIP: NEGATIVE
KETONES UR QL STRIP: NEGATIVE
LDLC SERPL CALC-MCNC: 153.8 MG/DL (ref 63–159)
LEUKOCYTE ESTERASE UR QL STRIP: NEGATIVE
LEUKOCYTE ESTERASE UR QL STRIP: NEGATIVE
LYMPHOCYTES # BLD AUTO: 1.88 K/UL (ref 1–4.8)
MCH RBC QN AUTO: 29.4 PG (ref 27–31)
MCHC RBC AUTO-ENTMCNC: 31.6 G/DL (ref 32–36)
MCV RBC AUTO: 93 FL (ref 82–98)
MICROSCOPIC COMMENT: ABNORMAL
MICROSCOPIC COMMENT: ABNORMAL
NITRITE UR QL STRIP: NEGATIVE
NITRITE UR QL STRIP: NEGATIVE
NONHDLC SERPL-MCNC: 194 MG/DL
NUCLEATED RBC (/100WBC) (OHS): 0 /100 WBC
PH UR STRIP: 7 [PH]
PH UR STRIP: 7 [PH]
PHOSPHATE SERPL-MCNC: 2.3 MG/DL (ref 2.7–4.5)
PLATELET # BLD AUTO: 346 K/UL (ref 150–450)
PMV BLD AUTO: 11.4 FL (ref 9.2–12.9)
POTASSIUM SERPL-SCNC: 3.8 MMOL/L (ref 3.5–5.1)
PROT SERPL-MCNC: 9 GM/DL (ref 6–8.4)
PROT UR QL STRIP: ABNORMAL
PROT UR QL STRIP: ABNORMAL
PROT UR-MCNC: 175 MG/DL
PROT/CREAT UR: 2.22 MG/G{CREAT}
RBC # BLD AUTO: 4.11 M/UL (ref 4–5.4)
RBC #/AREA URNS AUTO: 1 /HPF (ref 0–4)
RBC #/AREA URNS AUTO: 1 /HPF (ref 0–4)
RELATIVE EOSINOPHIL (OHS): 3.9 %
RELATIVE LYMPHOCYTE (OHS): 31.6 % (ref 18–48)
RELATIVE MONOCYTE (OHS): 9.4 % (ref 4–15)
RELATIVE NEUTROPHIL (OHS): 54.6 % (ref 38–73)
SODIUM SERPL-SCNC: 138 MMOL/L (ref 136–145)
SP GR UR STRIP: 1.01
SP GR UR STRIP: 1.01
SQUAMOUS #/AREA URNS AUTO: 3 /HPF
SQUAMOUS #/AREA URNS AUTO: 5 /HPF
TRIGL SERPL-MCNC: 201 MG/DL (ref 30–150)
TSH SERPL-ACNC: 1.51 UIU/ML (ref 0.4–4)
UROBILINOGEN UR STRIP-ACNC: NEGATIVE EU/DL
UROBILINOGEN UR STRIP-ACNC: NEGATIVE EU/DL
WBC # BLD AUTO: 5.95 K/UL (ref 3.9–12.7)
WBC #/AREA URNS AUTO: 1 /HPF (ref 0–5)
WBC #/AREA URNS AUTO: 1 /HPF (ref 0–5)

## 2025-06-16 PROCEDURE — 82306 VITAMIN D 25 HYDROXY: CPT

## 2025-06-16 PROCEDURE — 85025 COMPLETE CBC W/AUTO DIFF WBC: CPT

## 2025-06-16 PROCEDURE — G2211 COMPLEX E/M VISIT ADD ON: HCPCS | Mod: S$GLB,,, | Performed by: INTERNAL MEDICINE

## 2025-06-16 PROCEDURE — 81003 URINALYSIS AUTO W/O SCOPE: CPT

## 2025-06-16 PROCEDURE — 3078F DIAST BP <80 MM HG: CPT | Mod: CPTII,S$GLB,, | Performed by: INTERNAL MEDICINE

## 2025-06-16 PROCEDURE — 3008F BODY MASS INDEX DOCD: CPT | Mod: CPTII,S$GLB,, | Performed by: INTERNAL MEDICINE

## 2025-06-16 PROCEDURE — 3074F SYST BP LT 130 MM HG: CPT | Mod: CPTII,S$GLB,, | Performed by: INTERNAL MEDICINE

## 2025-06-16 PROCEDURE — 80061 LIPID PANEL: CPT

## 2025-06-16 PROCEDURE — 81003 URINALYSIS AUTO W/O SCOPE: CPT | Performed by: INTERNAL MEDICINE

## 2025-06-16 PROCEDURE — 99999 PR PBB SHADOW E&M-EST. PATIENT-LVL III: CPT | Mod: PBBFAC,,, | Performed by: INTERNAL MEDICINE

## 2025-06-16 PROCEDURE — 84443 ASSAY THYROID STIM HORMONE: CPT

## 2025-06-16 PROCEDURE — 99214 OFFICE O/P EST MOD 30 MIN: CPT | Mod: S$GLB,,, | Performed by: INTERNAL MEDICINE

## 2025-06-16 PROCEDURE — 84100 ASSAY OF PHOSPHORUS: CPT

## 2025-06-16 PROCEDURE — 1159F MED LIST DOCD IN RCRD: CPT | Mod: CPTII,S$GLB,, | Performed by: INTERNAL MEDICINE

## 2025-06-16 PROCEDURE — 83036 HEMOGLOBIN GLYCOSYLATED A1C: CPT

## 2025-06-16 PROCEDURE — 36415 COLL VENOUS BLD VENIPUNCTURE: CPT

## 2025-06-16 PROCEDURE — 84156 ASSAY OF PROTEIN URINE: CPT

## 2025-06-16 PROCEDURE — 81001 URINALYSIS AUTO W/SCOPE: CPT | Mod: 59 | Performed by: INTERNAL MEDICINE

## 2025-06-16 PROCEDURE — 80053 COMPREHEN METABOLIC PANEL: CPT

## 2025-06-16 NOTE — PROGRESS NOTES
Patient ID: Rosibel Herrera is a 55 y.o. female.    Chief Complaint: Back Pain      History of Present Illness    - Ms. Herrera presents with lower back pain radiating to the front, lasting for 3 days to a week.    Ms. Herrera reports lower back pain for 3 days to a week, described as a dull ache radiating to the front. On Saturday, she had severe pain with spasms and sharp stabbing sensations, causing difficulty getting out of bed. She believes the pain may be related to recent yard work activities.    She notes increased pressure when needing to urinate, but denies burning sensation during urination. She has been drinking water and avoiding sodas. For pain management, she has been taking OTC ibuprofen with decreasing effectiveness, and using Biofreeze, a non-medicated topical pain reliever.    She has a history of recurrent kidney stones since 1986 but states that the current pain differs from kidney stone pain. She also mentions a previous diagnosis of sciatica on the right side, noting that the current pain is in a different location.    She is currently under the care of a nephrologist, Dr. Jackson, and is scheduled for lab work at the hospital following this visit.      ROS:  General: denies fever, denies chills, denies fatigue, denies weight gain, denies weight loss  Eyes: denies vision changes, denies redness, denies discharge  ENT: denies ear pain, denies nasal congestion, denies sore throat  Cardiovascular: denies chest pain, denies palpitations, denies lower extremity edema  Respiratory: denies cough, denies shortness of breath  Gastrointestinal: denies abdominal pain, denies nausea, denies vomiting, denies diarrhea, denies constipation, denies blood in stool  Genitourinary: denies dysuria, denies hematuria, denies frequency, complains of pelvic pressure  Musculoskeletal: denies joint pain, denies muscle pain, complains of back pain  Skin: denies rash, denies lesion  Neurological: denies headache,  denies dizziness, denies numbness, denies tingling  Psychiatric: denies anxiety, denies depression, denies sleep difficulty            Physical Exam    General: In no acute distress.  Head: Normocephalic. Non traumatic.  Eyes: PERRLA. EOMs full. Conjunctivae clear. Fundi grossly normal.  Ears: EACs clear. TMs normal.  Nose: Mucosa pink. Mucosa moist. No obstruction.  Throat: Clear. No exudates. No lesions.  Neck: Supple. No masses. No thyromegaly. No bruits.  Chest: Lungs clear. No rales. No rhonchi. No wheezes.  Heart: RRR. No murmurs. No rubs. No gallops.  Abdomen: Soft. No tenderness. No masses. BS normal.  : Normal external genitalia. No lesions. No discharge. No hernias  noted.  Back: Normal curvature. No scoliosis. No tenderness.  Extremities: Warm. Well perfused. No upper extremity edema. No lower extremity edema. FROM. No deformities. No joint erythema.  Neuro: No focal deficits appreciated. Good muscle tone. Normal response to visual stimuli. Normal response to auditory stimuli.  Skin: Normal. No rashes. No lesions noted.          Current Medications[1]            VITAL SIGNS:  Vitals:    06/16/25 1325   BP: 110/70   BP Location: Left arm   Pulse: 74   Temp: 98.1 °F (36.7 °C)   TempSrc: Tympanic   SpO2: 98%   Weight: 85.4 kg (188 lb 4.4 oz)       CURRENT BMI:   Body mass index is 33.35 kg/m².    LABS REVIEWED:    CBC:  Lab Results   Component Value Date    WBC 5.42 05/05/2025    RBC 4.26 05/05/2025    HGB 12.8 05/05/2025    HCT 38.5 05/05/2025    MCV 90 05/05/2025    MCH 30.0 05/05/2025    MCHC 33.2 05/05/2025    RDW 13.2 05/05/2025     05/05/2025    MPV 10.0 05/05/2025    GRAN 4.4 02/10/2025    GRAN 68.5 02/10/2025    LYMPH 25.8 05/05/2025    LYMPH 1.40 05/05/2025    MONO 8.1 05/05/2025    MONO 0.44 05/05/2025    EOS 5.5 05/05/2025    EOS 0.30 05/05/2025    BASO 0.03 02/10/2025    EOSINOPHIL 1.6 02/10/2025    BASOPHIL 0.9 05/05/2025    BASOPHIL 0.05 05/05/2025       CHEMISTRY:  Sodium   Date Value  Ref Range Status   05/05/2025 138 136 - 145 mmol/L Final   02/10/2025 138 136 - 145 mmol/L Final     Potassium   Date Value Ref Range Status   05/05/2025 3.6 3.5 - 5.1 mmol/L Final   02/10/2025 3.7 3.5 - 5.1 mmol/L Final     Chloride   Date Value Ref Range Status   05/05/2025 103 95 - 110 mmol/L Final   02/10/2025 103 95 - 110 mmol/L Final     CO2   Date Value Ref Range Status   05/05/2025 21 (L) 23 - 29 mmol/L Final   02/10/2025 26 23 - 29 mmol/L Final     Glucose   Date Value Ref Range Status   05/05/2025 201 (H) 70 - 110 mg/dL Final   02/10/2025 114 (H) 70 - 110 mg/dL Final     BUN   Date Value Ref Range Status   05/05/2025 15 6 - 20 mg/dL Final     Creatinine   Date Value Ref Range Status   05/05/2025 1.1 0.5 - 1.4 mg/dL Final     Calcium   Date Value Ref Range Status   05/05/2025 9.4 8.7 - 10.5 mg/dL Final   02/10/2025 10.0 8.7 - 10.5 mg/dL Final     Protein Total   Date Value Ref Range Status   05/05/2025 10.3 (H) 6.0 - 8.4 gm/dL Final   05/05/2025 9.0 (H) 6.0 - 8.4 gm/dL Final     Comment:     Serum protein electrophoresis and immunofixation results should be interpreted in clinical context in that some therapeutic agents can result in false positive results (example, daratumumab). Correlation with the patient's therapeutic regimen is required.     Total Protein   Date Value Ref Range Status   02/10/2025 10.8 (H) 6.0 - 8.4 g/dL Final     Albumin   Date Value Ref Range Status   05/05/2025 3.3 (L) 3.5 - 5.2 g/dL Final   02/10/2025 3.5 3.5 - 5.2 g/dL Final     Total Bilirubin   Date Value Ref Range Status   02/10/2025 0.4 0.1 - 1.0 mg/dL Final     Comment:     For infants and newborns, interpretation of results should be based  on gestational age, weight and in agreement with clinical  observations.    Premature Infant recommended reference ranges:  Up to 24 hours.............<8.0 mg/dL  Up to 48 hours............<12.0 mg/dL  3-5 days..................<15.0 mg/dL  6-29 days.................<15.0 mg/dL        Bilirubin Total   Date Value Ref Range Status   05/05/2025 0.5 0.1 - 1.0 mg/dL Final     Comment:     For infants and newborns, interpretation of results should be based   on gestational age, weight and in agreement with clinical   observations.    Premature Infant recommended reference ranges:   0-24 hours:  <8.0 mg/dL   24-48 hours: <12.0 mg/dL   3-5 days:    <15.0 mg/dL   6-29 days:   <15.0 mg/dL     Alkaline Phosphatase   Date Value Ref Range Status   02/10/2025 93 40 - 150 U/L Final     ALP   Date Value Ref Range Status   05/05/2025 67 40 - 150 unit/L Final     AST   Date Value Ref Range Status   05/05/2025 17 11 - 45 unit/L Final   02/10/2025 23 10 - 40 U/L Final     ALT   Date Value Ref Range Status   05/05/2025 14 10 - 44 unit/L Final   02/10/2025 25 10 - 44 U/L Final     Anion Gap   Date Value Ref Range Status   05/05/2025 14 8 - 16 mmol/L Final     eGFR   Date Value Ref Range Status   05/05/2025 59 (L) >60 mL/min/1.73/m2 Final     Comment:     Estimated GFR calculated using the CKD-EPI creatinine (2021) equation.   02/10/2025 53 (A) >60 mL/min/1.73 m^2 Final       LIPID PANEL:  Lab Results   Component Value Date    CHOL 204 (H) 06/03/2024     Lab Results   Component Value Date    TRIG 188 (H) 06/03/2024     Lab Results   Component Value Date    HDL 33 (L) 06/03/2024     Lab Results   Component Value Date    LDLCALC 133.4 06/03/2024       THYROID:  Lab Results   Component Value Date    TSH 2.015 06/03/2024       DIABETES:  Lab Results   Component Value Date    HGBA1C 6.3 (H) 12/23/2024     Microalb/Creat Ratio   Date Value Ref Range Status   08/20/2024 339.0 (H) 0.0 - 30.0 ug/mg Final       Assessment and Plan     Assessment & Plan    LOW BACK PAIN:  - Assessed lower back pain, likely muscular in origin given location and nature of pain.  - Ms. Herrera reports 3-7 days of dull aching and sharp stabbing pains, particularly severe on Saturday when it prevented mobility.  - Ruled out kidney issues or  sciatica based on the location and nature of pain, despite patient recalling previous right-sided sciatica diagnosis.  - Ordered urinalysis to rule out other causes such as UTI.  - Prescribed Voltaren gel (topical NSAID) to be applied 4 times daily to affected area for targeted pain relief and recommended continued use of ibuprofen or acetaminophen as needed.  - Advised gentle stretching exercises for back pain relief.  - Explained the difference between medicated (Voltaren) and non-medicated (Biofreeze) topical pain relievers.    VITAMIN D DEFICIENCY:  - Noted patient is out of vitamin D supplements and unsure of when to see the prescribing physician next.  - Vitamin D was low in October, but likely corrected with 12 weeks of supplementation.  - Determined recheck not immediately necessary given recent supplementation.  - Ordered vitamin D level to be checked during upcoming physical.      FOLLOW-UP:  - Ordered comprehensive lab work including thyroid panel, lipid panel, and vitamin D level.  - Follow up for physical exam and mammogram.          1. Dysuria  Comments:  Will obtain Urinalysis  Orders:  -     Urinalysis, Reflex to Urine Culture Urine, Clean Catch    2. Primary hypertension  Comments:  Continuye current regimen. Will obtian CBC and CMP for annual exam to be scheduled  Orders:  -     CBC Auto Differential; Future; Expected date: 06/16/2025  -     Comprehensive Metabolic Panel; Future; Expected date: 06/16/2025    3. Dyslipidemia  Comments:  Will obtain Lipid panel and TSH for annual physical to be scheduled at a later date  Orders:  -     Lipid Panel; Future; Expected date: 06/16/2025  -     TSH; Future; Expected date: 06/16/2025    4. Vitamin D deficiency  Comments:  Will obtain Vitamin D level for annual physical to be scheduled at at a later date  Orders:  -     Vitamin D; Future; Expected date: 06/16/2025    5. Type 2 diabetes mellitus with hyperglycemia, without long-term current use of  insulin  Comments:  Will obtain A1c for annual physical to be scheduled at a later date  Orders:  -     Hemoglobin A1C; Future; Expected date: 06/16/2025           No follow-ups on file.  36 of total time spent on the encounter, which includes face to face time and non-face to face time preparing to see the patient. This includes obtaining and/or reviewing separately obtained history, performing a medically appropriate examination and/or evaluation, and counseling and educating the patient/family/caregiver. Includes documenting clinical information in the electronic or other health record, independently interpreting results (not separately reported) and communicating results to the patient/family/caregiver, with care coordination (not separately reported). Medications, tests and/or procedures ordered as necessary along with referring and communicating with other health professionals (when not separately reported).      This note was generated with the assistance of ambient listening technology. Verbal consent was obtained by the patient and accompanying visitor(s) for the recording of patient appointment to facilitate this note. I attest to having reviewed and edited the generated note for accuracy, though some syntax or spelling errors may persist. Please contact the author of this note for any clarification.            [1]   Current Outpatient Medications:     amLODIPine (NORVASC) 10 MG tablet, Take 1 tablet (10 mg total) by mouth once daily., Disp: 90 tablet, Rfl: 3    azelastine (ASTELIN) 137 mcg (0.1 %) nasal spray, 2 sprays by Nasal route 2 (two) times daily., Disp: , Rfl:     ergocalciferol (ERGOCALCIFEROL) 50,000 unit Cap, Take 1 capsule by mouth once a week, Disp: 12 capsule, Rfl: 0    fluticasone propionate (FLONASE) 50 mcg/actuation nasal spray, by Each Nostril route daily as needed., Disp: , Rfl:     losartan (COZAAR) 50 MG tablet, Take 1 tablet (50 mg total) by mouth once daily., Disp: 90 tablet, Rfl:  3    montelukast (SINGULAIR) 10 mg tablet, Take 10 mg by mouth once daily., Disp: , Rfl:     nitrofurantoin, macrocrystal-monohydrate, (MACROBID) 100 MG capsule, Take 100 mg by mouth once daily. M/W/F, Disp: , Rfl:     predniSONE (DELTASONE) 20 MG tablet, Take 5 mg by mouth every other day. 10mg, Disp: , Rfl:     metFORMIN (GLUCOPHAGE) 500 MG tablet, Take 1 tablet (500 mg total) by mouth 2 (two) times daily with meals., Disp: 180 tablet, Rfl: 1

## 2025-06-17 ENCOUNTER — TELEPHONE (OUTPATIENT)
Dept: FAMILY MEDICINE | Facility: CLINIC | Age: 55
End: 2025-06-17
Payer: COMMERCIAL

## 2025-06-17 ENCOUNTER — RESULTS FOLLOW-UP (OUTPATIENT)
Dept: FAMILY MEDICINE | Facility: CLINIC | Age: 55
End: 2025-06-17

## 2025-06-17 DIAGNOSIS — E78.5 DYSLIPIDEMIA: Primary | ICD-10-CM

## 2025-06-17 DIAGNOSIS — D86.2 SARCOIDOSIS OF LUNG WITH SARCOIDOSIS OF LYMPH NODES: ICD-10-CM

## 2025-06-17 RX ORDER — ERGOCALCIFEROL 1.25 MG/1
50000 CAPSULE ORAL
Qty: 12 CAPSULE | Refills: 0 | Status: SHIPPED | OUTPATIENT
Start: 2025-06-17 | End: 2025-07-18 | Stop reason: SDUPTHER

## 2025-06-17 RX ORDER — ATORVASTATIN CALCIUM 20 MG/1
20 TABLET, FILM COATED ORAL DAILY
Qty: 90 TABLET | Refills: 3 | Status: SHIPPED | OUTPATIENT
Start: 2025-06-17 | End: 2026-06-17

## 2025-06-17 NOTE — TELEPHONE ENCOUNTER
----- Message from Briana Vu DO sent at 6/17/2025  7:29 AM CDT -----  Total cholesterol elevated. Risk of heart attack and stroke 10.3%. Recommend starting Atorvastatin.  Vitamin D still low. Will refill Vitamin D supplement.  A1c is steadily increasing. Watch dietary intake and exercise  Please follow up for physical exam  ----- Message -----  From: Lab, Background User  Sent: 6/16/2025   9:05 PM CDT  To: Briana Vu DO

## 2025-06-24 ENCOUNTER — OFFICE VISIT (OUTPATIENT)
Dept: NEPHROLOGY | Facility: CLINIC | Age: 55
End: 2025-06-24
Payer: COMMERCIAL

## 2025-06-24 ENCOUNTER — PATIENT MESSAGE (OUTPATIENT)
Dept: FAMILY MEDICINE | Facility: CLINIC | Age: 55
End: 2025-06-24
Payer: COMMERCIAL

## 2025-06-24 DIAGNOSIS — I10 PRIMARY HYPERTENSION: ICD-10-CM

## 2025-06-24 DIAGNOSIS — R80.1 PERSISTENT PROTEINURIA: ICD-10-CM

## 2025-06-24 DIAGNOSIS — E83.52 HYPERCALCEMIA: ICD-10-CM

## 2025-06-24 DIAGNOSIS — N18.2 CKD (CHRONIC KIDNEY DISEASE) STAGE 2, GFR 60-89 ML/MIN: Primary | ICD-10-CM

## 2025-06-24 PROCEDURE — 98007 SYNCH AUDIO-VIDEO EST HI 40: CPT | Mod: 95,,, | Performed by: INTERNAL MEDICINE

## 2025-06-24 PROCEDURE — 3044F HG A1C LEVEL LT 7.0%: CPT | Mod: CPTII,95,, | Performed by: INTERNAL MEDICINE

## 2025-06-24 PROCEDURE — 1160F RVW MEDS BY RX/DR IN RCRD: CPT | Mod: CPTII,95,, | Performed by: INTERNAL MEDICINE

## 2025-06-24 PROCEDURE — 3066F NEPHROPATHY DOC TX: CPT | Mod: CPTII,95,, | Performed by: INTERNAL MEDICINE

## 2025-06-24 PROCEDURE — 1159F MED LIST DOCD IN RCRD: CPT | Mod: CPTII,95,, | Performed by: INTERNAL MEDICINE

## 2025-06-24 RX ORDER — AMLODIPINE BESYLATE 5 MG/1
5 TABLET ORAL DAILY
Qty: 90 TABLET | Refills: 3 | Status: SHIPPED | OUTPATIENT
Start: 2025-06-24 | End: 2026-06-24

## 2025-06-24 RX ORDER — LOSARTAN POTASSIUM 50 MG/1
50 TABLET ORAL DAILY
Qty: 90 TABLET | Refills: 3 | Status: SHIPPED | OUTPATIENT
Start: 2025-06-24 | End: 2026-06-24

## 2025-06-24 NOTE — PROGRESS NOTES
The patient location is:  [place]  The chief complaint leading to consultation is:  Hypertension, proteinuria    Visit type: audiovisual    Face to Face time with patient:  20  40 minutes of total time spent on the encounter, which includes face to face time and non-face to face time preparing to see the patient (eg, review of tests), Obtaining and/or reviewing separately obtained history, Documenting clinical information in the electronic or other health record, Independently interpreting results (not separately reported) and communicating results to the patient/family/caregiver, or Care coordination (not separately reported).         Each patient to whom he or she provides medical services by telemedicine is:  (1) informed of the relationship between the physician and patient and the respective role of any other health care provider with respect to management of the patient; and (2) notified that he or she may decline to receive medical services by telemedicine and may withdraw from such care at any time.    Notes:       Subjective:       Patient ID: Rosibel Herrera is a 55 y.o. female.    Chief Complaint:  As above    HPI    She presents to clinic today for routine follow-up.  Since her last office visit she has been doing well and has no specific or new complaints.  Her laboratory studies and medications were reviewed.  All Nephrology related questions were answered to her satisfaction.    Review of Systems   Constitutional: Negative.    HENT: Negative.     Respiratory: Negative.     Cardiovascular: Negative.    Gastrointestinal: Negative.    Genitourinary: Negative.    Musculoskeletal: Negative.    Skin: Negative.        There were no vitals taken for this visit.    Lab Results   Component Value Date    WBC 5.95 06/16/2025    HGB 12.1 06/16/2025    HCT 38.3 06/16/2025    MCV 93 06/16/2025     06/16/2025      BMP  Lab Results   Component Value Date     06/16/2025    K 3.8 06/16/2025      06/16/2025    CO2 22 (L) 06/16/2025    BUN 15 06/16/2025    CREATININE 0.9 06/16/2025    CALCIUM 8.9 06/16/2025    ANIONGAP 11 06/16/2025    ESTGFRAFRICA 60 07/16/2021    EGFRNONAA 52 (A) 07/16/2021     CMP  Sodium   Date Value Ref Range Status   06/16/2025 138 136 - 145 mmol/L Final   02/10/2025 138 136 - 145 mmol/L Final     Potassium   Date Value Ref Range Status   06/16/2025 3.8 3.5 - 5.1 mmol/L Final   02/10/2025 3.7 3.5 - 5.1 mmol/L Final     Chloride   Date Value Ref Range Status   06/16/2025 105 95 - 110 mmol/L Final   02/10/2025 103 95 - 110 mmol/L Final     CO2   Date Value Ref Range Status   06/16/2025 22 (L) 23 - 29 mmol/L Final   02/10/2025 26 23 - 29 mmol/L Final     Glucose   Date Value Ref Range Status   06/16/2025 83 70 - 110 mg/dL Final   02/10/2025 114 (H) 70 - 110 mg/dL Final     BUN   Date Value Ref Range Status   06/16/2025 15 6 - 20 mg/dL Final     Creatinine   Date Value Ref Range Status   06/16/2025 0.9 0.5 - 1.4 mg/dL Final     Calcium   Date Value Ref Range Status   06/16/2025 8.9 8.7 - 10.5 mg/dL Final   02/10/2025 10.0 8.7 - 10.5 mg/dL Final     Protein Total   Date Value Ref Range Status   06/16/2025 9.0 (H) 6.0 - 8.4 gm/dL Final     Total Protein   Date Value Ref Range Status   02/10/2025 10.8 (H) 6.0 - 8.4 g/dL Final     Albumin   Date Value Ref Range Status   06/16/2025 3.4 (L) 3.5 - 5.2 g/dL Final   02/10/2025 3.5 3.5 - 5.2 g/dL Final     Total Bilirubin   Date Value Ref Range Status   02/10/2025 0.4 0.1 - 1.0 mg/dL Final     Comment:     For infants and newborns, interpretation of results should be based  on gestational age, weight and in agreement with clinical  observations.    Premature Infant recommended reference ranges:  Up to 24 hours.............<8.0 mg/dL  Up to 48 hours............<12.0 mg/dL  3-5 days..................<15.0 mg/dL  6-29 days.................<15.0 mg/dL       Bilirubin Total   Date Value Ref Range Status   06/16/2025 0.4 0.1 - 1.0 mg/dL Final     Comment:      For infants and newborns, interpretation of results should be based   on gestational age, weight and in agreement with clinical   observations.    Premature Infant recommended reference ranges:   0-24 hours:  <8.0 mg/dL   24-48 hours: <12.0 mg/dL   3-5 days:    <15.0 mg/dL   6-29 days:   <15.0 mg/dL     Alkaline Phosphatase   Date Value Ref Range Status   02/10/2025 93 40 - 150 U/L Final     ALP   Date Value Ref Range Status   06/16/2025 63 40 - 150 unit/L Final     AST   Date Value Ref Range Status   06/16/2025 18 11 - 45 unit/L Final   02/10/2025 23 10 - 40 U/L Final     ALT   Date Value Ref Range Status   06/16/2025 13 10 - 44 unit/L Final   02/10/2025 25 10 - 44 U/L Final     Anion Gap   Date Value Ref Range Status   06/16/2025 11 8 - 16 mmol/L Final     eGFR if    Date Value Ref Range Status   07/16/2021 60 >60 mL/min/1.73 m^2 Final     eGFR if non    Date Value Ref Range Status   07/16/2021 52 (A) >60 mL/min/1.73 m^2 Final     Comment:     Calculation used to obtain the estimated glomerular filtration  rate (eGFR) is the CKD-EPI equation.               Objective:            Physical Exam  Vitals reviewed: Physical exam was not performed, virtual visit.       Assessment:       1. CKD (chronic kidney disease) stage 2, GFR 60-89 ml/min    2. Primary hypertension    3. Persistent proteinuria    4. Hypercalcemia        Plan:       1. Creatinine has remained remarkably stable between 0.9 and 1.2 for the past 6 months.  Urinalysis is bland with persistent low-grade proteinuria.  She has history of sarcoidosis.  She has been on a RAAS inhibitor in the past.  She reports that she was taken off of her RAAS inhibitor by her primary care physician.    We had a long discussion today regarding proteinuric nephropathy and renal sparing effects of RAAS inhibition.  Ideally she should be on as high dose RAAS inhibitor as possible to try to suppress proteinuria.  She is going to contact her  primary care physician with regard to her blood pressure regimen.  I will send a message to her primary care physician as well.    2. Her blood pressure is well controlled.  See discussion above.  Currently she is only on 10 mg of amlodipine.  I would suggest decreasing her amlodipine to 5 mg daily and restarting 50 mg of losartan for renal protection.  If her losartan can be dose escalated to 100 mg daily this would offer further renal protection.    3. She has persistent low-grade proteinuria.  Likely secondary to sarcoidosis.  Her proteinuria has increased since her last office visit reflective of discontinuing her RAAS inhibitor.  Will continue to monitor.    4. Calcium has returned to normal at 8.9 with an albumin of 3.4.  Phosphorus is low normal at 2.3.  Vitamin-D was low at 21, given her history of hypercalcemia we will hold off on starting vitamin-D replacement therapy at this time.    Follow-up labs: Renal panel, PC ratio, intact PTH.          Afshin Sheldon MD

## 2025-07-15 NOTE — PLAN OF CARE
Problem: Adult Inpatient Plan of Care  Goal: Plan of Care Review  Outcome: Progressing  Goal: Patient-Specific Goal (Individualized)  Outcome: Progressing  Goal: Absence of Hospital-Acquired Illness or Injury  Outcome: Progressing  Goal: Optimal Comfort and Wellbeing  Outcome: Progressing  Goal: Readiness for Transition of Care  Outcome: Progressing     Problem: Diabetes Comorbidity  Goal: Blood Glucose Level Within Targeted Range  Outcome: Progressing      no

## 2025-07-18 ENCOUNTER — HOSPITAL ENCOUNTER (OUTPATIENT)
Dept: RADIOLOGY | Facility: HOSPITAL | Age: 55
Discharge: HOME | End: 2025-07-18
Attending: FAMILY MEDICINE
Payer: COMMERCIAL

## 2025-07-18 ENCOUNTER — OFFICE VISIT (OUTPATIENT)
Dept: INTERNAL MEDICINE | Facility: CLINIC | Age: 55
End: 2025-07-18
Payer: COMMERCIAL

## 2025-07-18 VITALS
TEMPERATURE: 97 F | HEIGHT: 63 IN | SYSTOLIC BLOOD PRESSURE: 186 MMHG | DIASTOLIC BLOOD PRESSURE: 93 MMHG | OXYGEN SATURATION: 98 % | WEIGHT: 189.81 LBS | BODY MASS INDEX: 33.63 KG/M2 | HEART RATE: 65 BPM

## 2025-07-18 DIAGNOSIS — G89.29 CHRONIC BILATERAL LOW BACK PAIN WITHOUT SCIATICA: ICD-10-CM

## 2025-07-18 DIAGNOSIS — M54.50 CHRONIC BILATERAL LOW BACK PAIN WITHOUT SCIATICA: ICD-10-CM

## 2025-07-18 DIAGNOSIS — Z12.31 ENCOUNTER FOR SCREENING MAMMOGRAM FOR MALIGNANT NEOPLASM OF BREAST: ICD-10-CM

## 2025-07-18 DIAGNOSIS — D86.2 SARCOIDOSIS OF LUNG WITH SARCOIDOSIS OF LYMPH NODES: ICD-10-CM

## 2025-07-18 DIAGNOSIS — N18.32 CHRONIC RENAL FAILURE, STAGE 3B: ICD-10-CM

## 2025-07-18 DIAGNOSIS — E11.65 TYPE 2 DIABETES MELLITUS WITH HYPERGLYCEMIA, WITHOUT LONG-TERM CURRENT USE OF INSULIN: ICD-10-CM

## 2025-07-18 DIAGNOSIS — E78.5 DYSLIPIDEMIA: ICD-10-CM

## 2025-07-18 DIAGNOSIS — I10 PRIMARY HYPERTENSION: Primary | ICD-10-CM

## 2025-07-18 PROCEDURE — 72100 X-RAY EXAM L-S SPINE 2/3 VWS: CPT | Mod: TC,PO

## 2025-07-18 PROCEDURE — 99999 PR PBB SHADOW E&M-EST. PATIENT-LVL IV: CPT | Mod: PBBFAC,,, | Performed by: FAMILY MEDICINE

## 2025-07-18 PROCEDURE — 72100 X-RAY EXAM L-S SPINE 2/3 VWS: CPT | Mod: 26,,, | Performed by: RADIOLOGY

## 2025-07-18 RX ORDER — HYDROXYZINE HYDROCHLORIDE 25 MG/1
25 TABLET, FILM COATED ORAL 3 TIMES DAILY PRN
Qty: 30 TABLET | Refills: 1 | Status: SHIPPED | OUTPATIENT
Start: 2025-07-18

## 2025-07-18 RX ORDER — ERGOCALCIFEROL 1.25 MG/1
50000 CAPSULE ORAL
Qty: 12 CAPSULE | Refills: 3 | Status: SHIPPED | OUTPATIENT
Start: 2025-07-18

## 2025-07-18 NOTE — PROGRESS NOTES
Subjective:      Patient ID: Rosibel Herrera is a 55 y.o. female.    Chief Complaint: Establish Care      History of Present Illness    CHIEF COMPLAINT:  Ms. Herrera presents today for evaluation of hormonal concerns and back pain.    HYPERTENSION:  She reports home blood pressure monitoring with recent variability. Normal baseline readings are 128-136/82, typically in the morning. Over the past week, she experienced elevated readings up to 214/101, which she attributes to stress and dehydration. She acknowledges white coat syndrome during medical visits. To manage hydration and potentially reduce blood pressure, she has started consuming coconut water and watermelon. She reports the majority of her readings are within acceptable ranges and denies any significant associated symptoms.    BACK PAIN:  She reports ongoing lower back pain that radiates around the area, with possible history of sciatica. Pain is exacerbated during physical activities such as yard work. She has a history of severe episodes with muscle spasms that previously required assistance getting out of bed. Currently managing pain with OTC medications including Aleve and Tylenol Arthritis. She reports improved pain intensity and frequency compared to previous episodes, and denies current severe pain or need for mobility assistance. She acknowledges potential correlation between back pain and low vitamin D levels.    MEDICAL HISTORY:  Her history includes sarcoidosis followed by pulmonology, skin lupus well-controlled with sun avoidance and sunscreen (last significant flare many years ago), intermittent vertigo associated with allergies/sinus conditions, and hysterectomy with retained ovaries approximately 10 years ago.    CURRENT MEDICATIONS:  She takes Losartan and Amlodipine 5mg (recently decreased from 10mg) for blood pressure management, Vitamin D 50,000 units weekly, and is starting atorvastatin for cholesterol management.    LABS:  A1C is  6.5 with morning blood sugar readings between 123-127. LDL cholesterol is elevated at 153.8, significantly above the target of below 70.        Past Medical History:   Diagnosis Date    Calculus of kidney 4/9/2014 2:20:53 PM    [facility] - Urology: KidneyStones-No Additional Notes    Class 1 obesity due to excess calories with body mass index (BMI) of 30.0 to 30.9 in adult 06/23/2024    Complications affecting other specified body systems, hypertension 3/26/2014 1:38:44 PM    [facility] - Cardiovascular: Hypertension-No Additional Notes    Dyslipidemia 01/05/2023    Excessive or frequent menstruation 3/20/2014 9:22:15 AM    [facility] - [facility]: Bleeding, Menorrhagia-No Additional Notes    Hypertension     Leiomyoma of uterus 3/20/2014 9:23:21 AM    [facility] - [facility]: Fibroids/Leiomyoma of Uterus-2.1x1.8cm (POST/LT); 3.9x3.5cm (ANT TO ENDO)    Type 2 diabetes mellitus 10/18/2021          Past Surgical History:   Procedure Laterality Date    BRONCHOSCOPY Bilateral 7/17/2024    Procedure: Bronchoscopy - insert lighted tube into airway to take a biopsy of lung;  Surgeon: Liang Lu MD;  Location: [facility];  Service: Pulmonary;  Laterality: Bilateral;    ENDOBRONCHIAL ULTRASOUND Bilateral 7/17/2024    Procedure: ENDOBRONCHIAL ULTRASOUND (EBUS);  Surgeon: Liang Lu MD;  Location: [facility];  Service: Pulmonary;  Laterality: Bilateral;    ENDOMETRIAL ABLATION      HYSTERECTOMY      fibroids    LITHOTRIPSY       Family History   Problem Relation Name Age of Onset    Diabetes Father       Social History[1]  Review of patient's allergies indicates:   Allergen Reactions    Tylox [oxycodone-acetaminophen]        Review of Systems   Constitutional:  Negative for chills, fever and malaise/fatigue.   HENT:  Negative for congestion.    Respiratory:  Negative for cough and shortness of breath.    Cardiovascular:  Negative for chest pain and palpitations.  "  Gastrointestinal:  Negative for abdominal pain.   Musculoskeletal:  Positive for back pain. Negative for myalgias.   Skin:  Negative for rash.   Psychiatric/Behavioral:  The patient is nervous/anxious.      Objective:       BP (!) 186/93 (BP Location: Left arm, Patient Position: Sitting)   Pulse 65   Temp 97.2 °F (36.2 °C) (Tympanic)   Ht 5' 3" (1.6 m)   Wt 86.1 kg (189 lb 13.1 oz)   SpO2 98%   BMI 33.62 kg/m²   Physical Exam             Physical Exam  Vitals reviewed.   Constitutional:       General: She is not in acute distress.     Appearance: Normal appearance. She is well-developed. She is not ill-appearing or diaphoretic.   HENT:      Head: Normocephalic and atraumatic.      Right Ear: Hearing, tympanic membrane, ear canal and external ear normal.      Left Ear: Hearing, tympanic membrane, ear canal and external ear normal.      Nose: Nose normal.      Mouth/Throat:      Pharynx: Uvula midline. No oropharyngeal exudate.   Eyes:      Conjunctiva/sclera: Conjunctivae normal.      Pupils: Pupils are equal, round, and reactive to light.   Neck:      Thyroid: No thyromegaly.      Trachea: No tracheal deviation.   Cardiovascular:      Rate and Rhythm: Normal rate and regular rhythm.      Heart sounds: Normal heart sounds. No murmur heard.  Pulmonary:      Effort: Pulmonary effort is normal. No respiratory distress.      Breath sounds: Normal breath sounds.   Abdominal:      General: Bowel sounds are normal.      Palpations: Abdomen is soft.      Tenderness: There is no abdominal tenderness. There is no guarding.      Hernia: No hernia is present.   Musculoskeletal:         General: Normal range of motion.      Cervical back: Normal range of motion and neck supple.   Lymphadenopathy:      Cervical: No cervical adenopathy.   Skin:     General: Skin is warm and dry.      Capillary Refill: Capillary refill takes less than 2 seconds.   Neurological:      General: No focal deficit present.      Mental Status: She " is alert and oriented to person, place, and time.   Psychiatric:         Mood and Affect: Mood normal.         Behavior: Behavior normal.         Thought Content: Thought content normal.         Judgment: Judgment normal.         Assessment:     1. Primary hypertension    2. Dyslipidemia    3. Chronic renal failure, stage 3b    4. Encounter for screening mammogram for malignant neoplasm of breast    5. Chronic bilateral low back pain without sciatica    6. Type 2 diabetes mellitus with hyperglycemia, without long-term current use of insulin    7. Sarcoidosis of lung with sarcoidosis of lymph nodes      Plan:   Assessment & Plan    MEDICAL DECISION MAKING:  - Assessed BP control, noting recent elevated readings likely due to stress and dehydration.  - A1C of 6.5%, indicating need for continued diabetes management to prevent complications.  - LDL at 153.8 mg/dL, significantly above target of <70 mg/dL for patients with diabetes.  - Considered vitamin D supplementation strategy, as current levels remain insufficient despite weekly 50,000 unit doses.  - Decided against hormone level testing, as results would not alter management for post-menopausal patient not interested in hormone replacement therapy.  - Ordered lumbar spine XR to investigate chronic low back pain, considering possibility of arthritis.    PATIENT EDUCATION:  - Explained risks associated with elevated LDL cholesterol in diabetic patients, including increased arterial blockage formation and inflammation.  - Discussed normal vitamin D range (30-90) and potential benefits of achieving mid-range levels.  - Explained relationship between elevated glucose and increased thirst/dehydration.    ACTION ITEMS/LIFESTYLE:  - Ms. Herrera to increase water intake to address dehydration concerns.  - Recommend implementing stretching exercises for back to manage pain and prevent muscle spasms.  - Ms. Herrera can consider adding a daily multivitamin if diet lacks  sufficient fruits and vegetables.    MEDICATIONS:  - Started OTC Vitamin D 1,000-2,000 units daily, to be taken Tuesday through Saturday in addition to weekly 50,000 unit dose.  - Continued Losartan and Amlodipine at current doses for BP management.  - Started Atorvastatin for cholesterol management.    ORDERS:  - Ordered lumbar spine XR to investigate chronic low back pain, considering possibility of arthritis.  - Ordered mammogram.  - Ordered labs to be completed in 6 months: diabetes markers, vitamin D level, renal function, and lipid panel.        Primary hypertension    Dyslipidemia    Chronic renal failure, stage 3b    Encounter for screening mammogram for malignant neoplasm of breast  -     Mammo Digital Screening Bilat w/ Kyle (XPD); Future; Expected date: 07/18/2025    Chronic bilateral low back pain without sciatica  -     Cancel: X-Ray Lumbar Spine 5 View; Future; Expected date: 07/18/2025    Type 2 diabetes mellitus with hyperglycemia, without long-term current use of insulin  -     Comprehensive Metabolic Panel; Future; Expected date: 01/14/2026  -     Lipid Panel; Future; Expected date: 01/14/2026  -     CBC Auto Differential; Future; Expected date: 01/14/2026  -     Vitamin D; Future; Expected date: 01/14/2026    Sarcoidosis of lung with sarcoidosis of lymph nodes  -     ergocalciferol (ERGOCALCIFEROL) 50,000 unit Cap; Take 1 capsule (50,000 Units total) by mouth every 7 days.  Dispense: 12 capsule; Refill: 3    Other orders  -     hydrOXYzine HCL (ATARAX) 25 MG tablet; Take 1 tablet (25 mg total) by mouth 3 (three) times daily as needed for Anxiety.  Dispense: 30 tablet; Refill: 1    Blood pressure elevated further on rechecked-systolic greater than 200.  Patient asymptomatic except for feeling panicky.  Recommended she go home, take extra dose of both amlodipine and losartan and take a nap.  Recheck blood pressure tonight.  She will message me early next week to let me know what her blood pressure  is running.  Hydroxyzine Rx sent in in case panic recurs    X-ray today did show degenerative changes-discussed with the patient, not currently severely painful, handout with stretching exercises given to patient  Medication List with Changes/Refills   New Medications    HYDROXYZINE HCL (ATARAX) 25 MG TABLET    Take 1 tablet (25 mg total) by mouth 3 (three) times daily as needed for Anxiety.   Current Medications    AMLODIPINE (NORVASC) 5 MG TABLET    Take 1 tablet (5 mg total) by mouth once daily.    ATORVASTATIN (LIPITOR) 20 MG TABLET    Take 1 tablet (20 mg total) by mouth once daily.    AZELASTINE (ASTELIN) 137 MCG (0.1 %) NASAL SPRAY    2 sprays by Nasal route 2 (two) times daily.    FLUTICASONE PROPIONATE (FLONASE) 50 MCG/ACTUATION NASAL SPRAY    by Each Nostril route daily as needed.    LOSARTAN (COZAAR) 50 MG TABLET    Take 1 tablet (50 mg total) by mouth once daily.    MONTELUKAST (SINGULAIR) 10 MG TABLET    Take 10 mg by mouth once daily.   Changed and/or Refilled Medications    Modified Medication Previous Medication    ERGOCALCIFEROL (ERGOCALCIFEROL) 50,000 UNIT CAP ergocalciferol (ERGOCALCIFEROL) 50,000 unit Cap       Take 1 capsule (50,000 Units total) by mouth every 7 days.    Take 1 capsule (50,000 Units total) by mouth every 7 days.   Discontinued Medications    METFORMIN (GLUCOPHAGE) 500 MG TABLET    Take 1 tablet (500 mg total) by mouth 2 (two) times daily with meals.    NITROFURANTOIN, MACROCRYSTAL-MONOHYDRATE, (MACROBID) 100 MG CAPSULE    Take 100 mg by mouth once daily. M/W/F    PREDNISONE (DELTASONE) 20 MG TABLET    Take 5 mg by mouth every other day. 10mg       This note was generated with the assistance of ambient listening technology. Verbal consent was obtained by the patient and accompanying visitor(s) for the recording of patient appointment to facilitate this note. I attest to having reviewed and edited the generated note for accuracy, though some syntax or spelling errors may persist.  Please contact the author of this note for any clarification.            [1]   Social History  Socioeconomic History    Marital status:    Tobacco Use    Smoking status: Never    Smokeless tobacco: Never   Substance and Sexual Activity    Alcohol use: Yes     Comment: rarely    Drug use: No    Sexual activity: Yes     Partners: Male     Birth control/protection: Post-menopausal     Social Drivers of Health     Financial Resource Strain: Low Risk  (2/17/2025)    Overall Financial Resource Strain (CARDIA)     Difficulty of Paying Living Expenses: Not hard at all   Food Insecurity: No Food Insecurity (2/17/2025)    Hunger Vital Sign     Worried About Running Out of Food in the Last Year: Never true     Ran Out of Food in the Last Year: Never true   Transportation Needs: No Transportation Needs (2/17/2025)    PRAPARE - Transportation     Lack of Transportation (Medical): No     Lack of Transportation (Non-Medical): No   Physical Activity: Insufficiently Active (2/17/2025)    Exercise Vital Sign     Days of Exercise per Week: 2 days     Minutes of Exercise per Session: 60 min   Stress: No Stress Concern Present (2/17/2025)    [employer] - Occupational Stress Questionnaire     Feeling of Stress : Not at all   Housing Stability: Low Risk  (2/17/2025)    Housing Stability Vital Sign     Unable to Pay for Housing in the Last Year: No     Number of Times Moved in the Last Year: 0     Homeless in the Last Year: No

## 2025-07-18 NOTE — LETTER
July 18, 2025      [facility]  [address] [address]  Phone: [phone]  Fax: [phone]       Patient: Rosibel Herrera   YOB: 1970  Date of Visit: 07/18/2025    To Whom It May Concern:    Anatoly Herrera  was at [facility] on 07/18/2025. The patient may return to work/school on 07/21/2025 with no restrictions. If you have any questions or concerns, or if I can be of further assistance, please do not hesitate to contact me.    Sincerely,        Abril Corrales MA

## 2025-07-27 NOTE — TELEPHONE ENCOUNTER
No care due was identified.  [facility] Embedded Care Due Messages. Reference number: [id number].   7/27/2025 6:36:33 AM CDT

## 2025-07-28 RX ORDER — AMLODIPINE BESYLATE 10 MG/1
10 TABLET ORAL
Qty: 90 TABLET | Refills: 0 | OUTPATIENT
Start: 2025-07-28

## 2025-07-28 NOTE — TELEPHONE ENCOUNTER
Refill Decision Note   Rosibel Herrera  is requesting a refill authorization.  Brief Assessment and Rationale for Refill:  Quick Discontinue     Medication Therapy Plan:  discontinued on 6/24/2025 by Briana Vu DO.      Comments:     Note composed:5:57 AM 07/28/2025

## 2025-08-19 ENCOUNTER — PATIENT MESSAGE (OUTPATIENT)
Dept: ADMINISTRATIVE | Facility: HOSPITAL | Age: 55
End: 2025-08-19
Payer: COMMERCIAL

## 2025-08-27 DIAGNOSIS — E11.9 TYPE 2 DIABETES MELLITUS WITHOUT COMPLICATION: ICD-10-CM
